# Patient Record
Sex: FEMALE | Race: BLACK OR AFRICAN AMERICAN | Employment: OTHER | URBAN - METROPOLITAN AREA
[De-identification: names, ages, dates, MRNs, and addresses within clinical notes are randomized per-mention and may not be internally consistent; named-entity substitution may affect disease eponyms.]

---

## 2017-01-16 ENCOUNTER — OFFICE VISIT (OUTPATIENT)
Dept: INTERNAL MEDICINE CLINIC | Age: 67
End: 2017-01-16

## 2017-01-16 VITALS
HEIGHT: 63 IN | RESPIRATION RATE: 18 BRPM | DIASTOLIC BLOOD PRESSURE: 85 MMHG | HEART RATE: 81 BPM | WEIGHT: 136.4 LBS | SYSTOLIC BLOOD PRESSURE: 140 MMHG | BODY MASS INDEX: 24.17 KG/M2 | TEMPERATURE: 98 F | OXYGEN SATURATION: 98 %

## 2017-01-16 DIAGNOSIS — I10 ESSENTIAL HYPERTENSION: ICD-10-CM

## 2017-01-16 DIAGNOSIS — E78.00 PURE HYPERCHOLESTEROLEMIA: ICD-10-CM

## 2017-01-16 DIAGNOSIS — E11.42 TYPE 2 DIABETES MELLITUS WITH DIABETIC POLYNEUROPATHY, WITHOUT LONG-TERM CURRENT USE OF INSULIN (HCC): Primary | ICD-10-CM

## 2017-01-16 RX ORDER — HYDROCODONE BITARTRATE AND ACETAMINOPHEN 5; 325 MG/1; MG/1
TABLET ORAL
Refills: 0 | COMMUNITY
Start: 2016-12-09 | End: 2017-07-13

## 2017-01-16 NOTE — PROGRESS NOTES
HISTORY OF PRESENT ILLNESS  Fabiola Chandler is a 78 y.o. female. HPI  Here for dm. She has been controlled on diet alone. She regained the weight she lost on metformin. Her htn is controlled. She is on a statin for hld. Past Medical History   Diagnosis Date    Allergic rhinitis     Atrophic vaginitis     DM [facility]) [facility] [address]     HLD (hyperlipidemia)     Hypertension     Neuropathy     Osteoporosis      meds started 2007     Current Outpatient Prescriptions   Medication Sig    HYDROcodone-acetaminophen (NORCO) 5-325 mg per tablet TAKE 1 TO 2 TABLET BY MOUTH EVERY 4 TO 6 HOUS AS NEEDED FOR PAIN    atorvastatin (LIPITOR) 20 mg tablet Take 1 Tab by mouth daily.  triamterene-hydrochlorothiazide (MAXZIDE) 37.5-25 mg per tablet TAKE 1 TABLET EVERY DAY    glucose blood VI test strips (BLOOD GLUCOSE TEST) strip daily     No current facility-administered medications for this visit. Review of Systems   All other systems reviewed and are negative. Visit Vitals    /85 (BP 1 Location: Right arm, BP Patient Position: Sitting)    Pulse 81    Temp 98 °F (36.7 °C) (Oral)    Resp 18    Ht 5' 3\" (1.6 m)    Wt 136 lb 6.4 oz (61.9 kg)    SpO2 98%    BMI 24.16 kg/m2       Physical Exam   Constitutional: She appears well-developed and well-nourished. Cardiovascular: Normal rate, regular rhythm and normal heart sounds. Pulmonary/Chest: Effort normal and breath sounds normal. No respiratory distress. She has no wheezes. She has no rales. Nursing note and vitals reviewed. Lab Results   Component Value Date/Time    Hemoglobin A1c 6.0 07/06/2016 10:27 AM       ASSESSMENT and PLAN  Judy Obrien was seen today for hypertension.     Diagnoses and all orders for this visit:    Type 2 diabetes mellitus with diabetic polyneuropathy, without long-term current use of insulin (HCC)  -     HEMOGLOBIN A1C WITH EAG  The current medical regimen is effective;  continue present plan and medications. Essential hypertension  -     METABOLIC PANEL, COMPREHENSIVE  The current medical regimen is effective;  continue present plan and medications. Pure hypercholesterolemia  -     LIPID PANEL  The current medical regimen is effective;  continue present plan and medications.       Reviewed plan of care with the patient who has provided input and agrees with the goals

## 2017-01-16 NOTE — MR AVS SNAPSHOT
Visit Information Date & Time Provider Department Dept. Phone Encounter #  
 1/16/2017  4:00 PM Floresita Roman MD [facility] [phone] [id number] Follow-up Instructions Return in about 6 months (around 7/16/2017). Upcoming Health Maintenance Date Due Hepatitis C Screening 1950 EYE EXAM RETINAL OR DILATED Q1 10/19/1959 ZOSTER VACCINE AGE 60> 10/19/2009 GLAUCOMA SCREENING Q2Y 10/19/2014 MEDICARE YEARLY EXAM 3/2/2016 FOOT EXAM Q1 10/8/2016 HEMOGLOBIN A1C Q6M 1/6/2017 MICROALBUMIN Q1 7/6/2017 LIPID PANEL Q1 7/6/2017 BREAST CANCER SCRN MAMMOGRAM 11/7/2018 DTaP/Tdap/Td series (2 - Td) 9/13/2019 COLONOSCOPY 12/8/2021 Allergies as of 1/16/2017  Review Complete On: 1/16/2017 By: Deangelo Lowry LPN No Known Allergies Current Immunizations  Reviewed on 12/2/2015 Name Date Pneumococcal Conjugate (PCV-13) 3/2/2015  2:34 PM  
 Pneumococcal Polysaccharide (PPSV-23) 12/2/2014 11:12 AM  
 TDAP Vaccine 9/13/2009 Not reviewed this visit You Were Diagnosed With   
  
 Codes Comments Type 2 diabetes mellitus with diabetic polyneuropathy, without long-term current use of insulin (HCC)    -  Primary ICD-10-CM: E11.42 
ICD-9-CM: 250.60, 357.2 Essential hypertension     ICD-10-CM: I10 
ICD-9-CM: 401.9 Pure hypercholesterolemia     ICD-10-CM: E78.00 ICD-9-CM: 272.0 Vitals BP Pulse Temp Resp Height(growth percentile) Weight(growth percentile) 140/85 (BP 1 Location: Right arm, BP Patient Position: Sitting) 81 98 °F (36.7 °C) (Oral) 18 5' 3\" (1.6 m) 136 lb 6.4 oz (61.9 kg) SpO2 BMI OB Status Smoking Status 98% 24.16 kg/m2 Menopause Never Smoker Vitals History BMI and BSA Data Body Mass Index Body Surface Area  
 24.16 kg/m 2 1.66 m 2 Preferred Pharmacy Pharmacy Name Phone  [phone] [phone] Your Updated Medication List  
  
   
This list is accurate as of: 1/16/17  4:05 PM.  Always use your most recent med list.  
  
  
  
  
 atorvastatin 20 mg tablet Commonly known as:  LIPITOR Take 1 Tab by mouth daily. glucose blood VI test strips strip Commonly known as:  blood glucose test  
daily HYDROcodone-acetaminophen 5-325 mg per tablet Commonly known as:  NORCO  
TAKE 1 TO 2 TABLET BY MOUTH EVERY 4 TO 6 HOUS AS NEEDED FOR PAIN  
  
 triamterene-hydroCHLOROthiazide 37.5-25 mg per tablet Commonly known as:  Patrizia Morris TAKE 1 TABLET EVERY DAY We Performed the Following HEMOGLOBIN A1C WITH EAG [11393 CPT(R)] LIPID PANEL [88869 CPT(R)] METABOLIC PANEL, COMPREHENSIVE [89665 CPT(R)] Follow-up Instructions Return in about 6 months (around 7/16/2017). Introducing [facility]! Dear Nicolas Nunez: Thank you for requesting a [employer] account. Our records indicate that you already have an active [employer] account. You can access your account anytime at [email or web address] [email or web address] Did you know that you can access your hospital and ER discharge instructions at any time in [employer]? You can also review all of your test results from your hospital stay or ER visit. Additional Information If you have questions, please visit the Frequently Asked Questions section of the [employer] website at [email or web address]/. Remember, [employer] is NOT to be used for urgent needs. For medical emergencies, dial 911. Now available from your iPhone and Android! Please provide this summary of care documentation to your next provider. Your primary care clinician is listed as [address] [address] [place] [address] [address]. If you have any questions after today's visit, please call [phone].

## 2017-01-17 LAB
ALBUMIN SERPL-MCNC: 4.5 G/DL (ref 3.6–4.8)
ALBUMIN/GLOB SERPL: 1.7 {RATIO} (ref 1.1–2.5)
ALP SERPL-CCNC: 42 IU/L (ref 39–117)
ALT SERPL-CCNC: 11 IU/L (ref 0–32)
AST SERPL-CCNC: 15 IU/L (ref 0–40)
BILIRUB SERPL-MCNC: 0.3 MG/DL (ref 0–1.2)
BUN SERPL-MCNC: 15 MG/DL (ref 8–27)
BUN/CREAT SERPL: 20 (ref 11–26)
CALCIUM SERPL-MCNC: 10 MG/DL (ref 8.7–10.3)
CHLORIDE SERPL-SCNC: 100 MMOL/L (ref 96–106)
CHOLEST SERPL-MCNC: 142 MG/DL (ref 100–199)
CO2 SERPL-SCNC: 24 MMOL/L (ref 18–29)
CREAT SERPL-MCNC: 0.74 MG/DL (ref 0.57–1)
EST. AVERAGE GLUCOSE BLD GHB EST-MCNC: 128 MG/DL
GLOBULIN SER CALC-MCNC: 2.7 G/DL (ref 1.5–4.5)
GLUCOSE SERPL-MCNC: 83 MG/DL (ref 65–99)
HBA1C MFR BLD: 6.1 % (ref 4.8–5.6)
HDLC SERPL-MCNC: 55 MG/DL
INTERPRETATION, 910389: NORMAL
LDLC SERPL CALC-MCNC: 71 MG/DL (ref 0–99)
POTASSIUM SERPL-SCNC: 4.3 MMOL/L (ref 3.5–5.2)
PROT SERPL-MCNC: 7.2 G/DL (ref 6–8.5)
SODIUM SERPL-SCNC: 140 MMOL/L (ref 134–144)
TRIGL SERPL-MCNC: 80 MG/DL (ref 0–149)
VLDLC SERPL CALC-MCNC: 16 MG/DL (ref 5–40)

## 2017-04-24 RX ORDER — ATORVASTATIN CALCIUM 20 MG/1
20 TABLET, FILM COATED ORAL DAILY
Qty: 90 TAB | Refills: 1 | Status: SHIPPED | OUTPATIENT
Start: 2017-04-24 | End: 2017-09-09 | Stop reason: SDUPTHER

## 2017-04-24 NOTE — TELEPHONE ENCOUNTER
Pt needs a refill on \"Atorvastatin\" 20  Mg (Rx # [id number]) called into [facility] [phone]. Best contact number is [phone].

## 2017-06-12 ENCOUNTER — TELEPHONE (OUTPATIENT)
Dept: INTERNAL MEDICINE CLINIC | Age: 67
End: 2017-06-12

## 2017-06-12 DIAGNOSIS — M25.551 RIGHT HIP PAIN: Primary | ICD-10-CM

## 2017-06-14 ENCOUNTER — TELEPHONE (OUTPATIENT)
Dept: INTERNAL MEDICINE CLINIC | Age: 67
End: 2017-06-14

## 2017-06-14 NOTE — TELEPHONE ENCOUNTER
Spoke with patient. Patient states that the doctor  On the referral do not take her insurance.  Patient has already contacted her insurance company to find another doctor

## 2017-06-14 NOTE — TELEPHONE ENCOUNTER
Pt is requesting a call back about a orthopedic doctor.  Pt contact number (C) [phone] or [phone]      From answering service

## 2017-06-28 ENCOUNTER — TELEPHONE (OUTPATIENT)
Dept: INTERNAL MEDICINE CLINIC | Age: 67
End: 2017-06-28

## 2017-06-28 NOTE — TELEPHONE ENCOUNTER
Patient states that her orthopedic doctor Dr. Anum Bonds gave her the order for the MRI.  Patient states that she will contact that office

## 2017-06-28 NOTE — TELEPHONE ENCOUNTER
Pt is requesting a referral be faxed to [facility] MRI department at [phone]. Pt has an appt scheduled on 06/30/2017 at 9:30am. Office number [phone]. Best contact number [phone].       From answering service

## 2017-06-28 NOTE — TELEPHONE ENCOUNTER
Pt is requesting an authorization sent to [facility] for an MRI.  Please give pt a call back

## 2017-07-13 ENCOUNTER — OFFICE VISIT (OUTPATIENT)
Dept: INTERNAL MEDICINE CLINIC | Age: 67
End: 2017-07-13

## 2017-07-13 VITALS
HEART RATE: 81 BPM | WEIGHT: 135.8 LBS | HEIGHT: 63 IN | DIASTOLIC BLOOD PRESSURE: 82 MMHG | OXYGEN SATURATION: 99 % | BODY MASS INDEX: 24.06 KG/M2 | RESPIRATION RATE: 17 BRPM | TEMPERATURE: 97.5 F | SYSTOLIC BLOOD PRESSURE: 124 MMHG

## 2017-07-13 DIAGNOSIS — Z00.00 MEDICARE ANNUAL WELLNESS VISIT, SUBSEQUENT: ICD-10-CM

## 2017-07-13 DIAGNOSIS — Z23 NEED FOR ZOSTAVAX ADMINISTRATION: ICD-10-CM

## 2017-07-13 DIAGNOSIS — E11.42 TYPE 2 DIABETES MELLITUS WITH DIABETIC POLYNEUROPATHY, WITHOUT LONG-TERM CURRENT USE OF INSULIN (HCC): Primary | ICD-10-CM

## 2017-07-13 DIAGNOSIS — Z71.89 LIVING WILL, COUNSELING/DISCUSSION: ICD-10-CM

## 2017-07-13 DIAGNOSIS — Z13.31 SCREENING FOR DEPRESSION: ICD-10-CM

## 2017-07-13 DIAGNOSIS — E78.00 PURE HYPERCHOLESTEROLEMIA: ICD-10-CM

## 2017-07-13 DIAGNOSIS — I10 ESSENTIAL HYPERTENSION: ICD-10-CM

## 2017-07-13 NOTE — PROGRESS NOTES
HISTORY OF PRESENT ILLNESS  Abhishek Marshall is a 78 y.o. female. HPI  Here for dm. She is controlled on diet alone. She has gained weight off metformin and is pleased. Her htn is controlled. She is on a statin. Her hcm is current. Past Medical History:   Diagnosis Date    Allergic rhinitis     Atrophic vaginitis     DM (diabetes mellitus) ([address]     HLD (hyperlipidemia)     Hypertension     [facility]urop[facility] ([facility] [address]     Osteoporosis     meds started 2007     Current Outpatient Prescriptions   Medication Sig    atorvastatin (LIPITOR) 20 mg tablet Take 1 Tab by mouth daily.  triamterene-hydrochlorothiazide (MAXZIDE) 37.5-25 mg per tablet TAKE 1 TABLET EVERY DAY    glucose blood VI test strips (BLOOD GLUCOSE TEST) strip daily     No current facility-administered medications for this visit. Review of Systems   All other systems reviewed and are negative. Visit Vitals    /82 (BP 1 Location: Left arm, BP Patient Position: At rest)    Pulse 81    Temp 97.5 °F (36.4 °C) (Oral)    Resp 17    Ht 5' 3\" (1.6 m)    Wt 135 lb 12.8 oz (61.6 kg)    SpO2 99%    BMI 24.06 kg/m2       Physical Exam   Constitutional: She appears well-developed and well-nourished. Cardiovascular: Normal rate, regular rhythm, normal heart sounds and intact distal pulses. Pulmonary/Chest: Effort normal and breath sounds normal. No respiratory distress. She has no wheezes. She has no rales. Musculoskeletal: She exhibits no edema. Foot exam - bilateral normal; no swelling, tenderness or skin or vascular lesions. Color and temperature is normal. Sensation is intact. Peripheral pulses are palpable. Toenails are normal.     Nursing note and vitals reviewed. Lab Results   Component Value Date/Time    Hemoglobin A1c 6.1 01/16/2017 04:12 PM       ASSESSMENT and PLAN  Chance Clifton was seen today for hypertension, diabetes and annual wellness visit.     Diagnoses and all orders for this visit:    Type 2 diabetes mellitus with diabetic polyneuropathy, without long-term current use of insulin (HCC)  -     HEMOGLOBIN A1C WITH EAG  -     MICROALBUMIN, UR, RAND W/ MICROALBUMIN/CREA RATIO  -      DIABETES FOOT EXAM  The current medical regimen is effective;  continue present plan and medications. Essential hypertension  -     METABOLIC PANEL, COMPREHENSIVE  The current medical regimen is effective;  continue present plan and medications. Pure hypercholesterolemia  -     LIPID PANEL  The current medical regimen is effective;  continue present plan and medications.         Reviewed plan of care with the patient who has provided input and agrees with the goals

## 2017-07-13 NOTE — PATIENT INSTRUCTIONS
Medicare Part B Preventive Services Limitations Recommendation Scheduled   Glaucoma Screening  (Eye Exam)  Covered for patients with diagnosis of diabetes or family history of glaucoma; -Americans age 48 and older; -Americans age 72 and older Completed around 8/2016    Recommended every 1-2 years Due 8/2017 or 9/2017   Colorectal Cancer Screening    -Fecal occult blood test every year OR  -Flexible sigmoidoscopy every 5 yrs OR  -Colonoscopy every 10 years OR  -Barium Enema Age 54-65; After age 76 if history of abnormal results Completed 12/8/2011      Recommended every 10 years  Due 12/2021   Bone Mass Measurement (Chris Lords)     Age 61 and older    Requires diagnosis related to osteoporosis or estrogen deficiency OR patient has history of long-term glucocorticoid treatment     Every 5 years for normal scan, every 2 years for abnormal scan Completed 1/16/2015      Recommended every 5 years Due 1/2020    Take Vitamin D 1,000 units daily. Do not take Calcium   Screening Mammography  Age 54-69; Every 2 years  Completed 11/7/2016      Recommended every 2 years  Due 11/2017    Please call   [phone] to schedule   Screening Pap Tests and Pelvic Examination                  Age 18-67; Every 3 years     Completed around 6/2016    Recommended every 3 years Due 6/2019   Cardiovascular Screening Blood Tests   (Cholesterol panel) Lipid panel every 5 years; Annually if diagnosed with high cholesterol and/or diabetes  Completed 1/16/2017      Recommended annually Due NOW   Diabetes Screening Tests    -Basic Metabolic Panel (BMP) or [id number] (HgbA1C)   BMP every 3 years for non-diabetic patients    Hgb A1C every 6 months for   pre-diabetic patients or HgbA1C <7    HgbA1C every 3 months if 7 or greater Completed 1/16/2017    HgbA1C  Recommended every 6 months Due NOW   Resources for Smoking Cessation    American Lung Association  www.lung. org  8-248-GJIRUNK    www.smokefree. gov    1-800-QUIT NOW   Seasonal Influenza Vaccination             Age 6 months and older Completed 11/2016    Recommended Annually Due Fall 2017   Pneumococcal Vaccination  (PPSV 21) Age 72 and older;  <65 if at high risk for getting Pneumonia Completed 12/2/2014    Recommended twice after age 72, space vaccines 5 years apart Due 12/2019       Prevnar Vaccination  (PCV 13) Age 72 and older Completed 3/2/2015    Recommended once Complete   Tetanus Vaccine -Only covered by Medicare Part D through the [address] a prescription from your primary care provider   Completed 9/13/2009    Recommended every 10 years  Due 9/2019    Take prescription to pharmacy for administration   Zoster Vaccine (Shingles) Age 61 and older, one time dose    -Only covered by Medicare Part D through the pharmacy       Completed never        Recommended once  Due NOW    Take prescription to pharmacy for administration     Advanced Medical Directive/Living Will  If you have completed an Advanced Medical Directive or a Living Will please bring a copy to the office at your convenience to be scanned into your record.

## 2017-07-13 NOTE — PROGRESS NOTES
This is a Subsequent Medicare Annual Wellness Visit providing Personalized Prevention Plan Services (PPPS) (Performed 12 months after initial AWV and PPPS )    I have reviewed the patient's medical history in detail and updated the computerized patient record. History     Past Medical History:   Diagnosis Date    Allergic rhinitis     Atrophic vaginitis     [facility] [address]     HLD (hyperlipidemia)     Hypertension     Neuropathy ([facility] [address]     Osteoporosis     meds started 2007      Past Surgical History:   Procedure Laterality Date    HX COLONOSCOPY  2011     Current Outpatient Prescriptions   Medication Sig Dispense Refill    atorvastatin (LIPITOR) 20 mg tablet Take 1 Tab by mouth daily.  90 Tab 1    triamterene-hydrochlorothiazide (MAXZIDE) 37.5-25 mg per tablet TAKE 1 TABLET EVERY DAY 90 Tab 3    glucose blood VI test strips (BLOOD GLUCOSE TEST) strip daily 1 Each 11     No Known Allergies  Family History   Problem Relation Age of Onset    Breast Cancer Mother 48     breast    Diabetes Brother     Diabetes Brother     Hypertension Brother      Social History   Substance Use Topics    Smoking status: Never Smoker    Smokeless tobacco: Never Used    Alcohol use Yes     Patient Active Problem List   Diagnosis Code    Common migraine G43.009    GERD (gastroesophageal reflux disease) K21.9    Osteopenia M85.80    Atrophic vaginitis N95.2    Allergic rhinitis J30.9    Type 2 diabetes mellitus with diabetic polyneuropathy (HCC) E11.42    Essential hypertension I10    Pure hypercholesterolemia E78.00    Living will, counseling/discussion Z71.89       Depression Risk Factor Screening:     PHQ over the last two weeks 7/13/2017   Little interest or pleasure in doing things Not at all   Feeling down, depressed or hopeless Not at all   Total Score PHQ 2 0     Alcohol Risk Factor Screening:   Deferred      Functional Ability and Level of Safety:     Hearing Loss   none    Activities of Daily Living   Self-care. Requires assistance with: no ADLs    Fall Risk     Fall Risk Assessment, last 12 mths 7/13/2017   Able to walk? Yes   Fall in past 12 months? No   Fall with injury? -   Number of falls in past 12 months -   Fall Risk Score -     Abuse Screen   Patient is not abused    Review of Systems   Not required  Annual Medicare Wellness Visit    Physical Examination     Evaluation of Cognitive Function:  Mood/affect:  Happy, pleasant  Appearance: age appropriate, well-dressed  Family member/caregiver input: none present    No exam performed today, Annual Medicare Wellness Visit. Patient Care Team:  Carie Skinner MD as PCP - Sudheer Dubon MD (Orthopedic Surgery)  Amparo Hudson MD (Orthopedic Surgery)  Dr. Davey Gifford (Ophthalmology)  Tricia Clements MD (Gynecology)    Advice/Referrals/Counseling   Education and counseling provided:  Are appropriate based on today's review and evaluation  End-of-Life planning (with patient's consent)  Bone mass measurement (DEXA)      Assessment/Plan       ICD-10-CM ICD-9-CM    1. Type 2 diabetes mellitus with diabetic polyneuropathy, without long-term current use of insulin (HCC) E11.42 250.60 HEMOGLOBIN A1C WITH EAG     357.2 MICROALBUMIN, UR, RAND W/ MICROALBUMIN/CREA RATIO      HM DIABETES FOOT EXAM   2. Essential hypertension W62 707.9 METABOLIC PANEL, COMPREHENSIVE   3. Pure hypercholesterolemia E78.00 272.0 LIPID PANEL   4. Medicare annual wellness visit, subsequent Z00.00 V70.0 CBC WITH AUTOMATED DIFF   5. Need for Zostavax administration Z23 V04.89    6. Living will, counseling/discussion Z71.89 V65.49    7. Screening for depression Z13.89 V79.0 DEPRESSION SCREEN ANNUAL       1. Patient reports she has a Living Will. Requesting a copy be brought to the office for scanning into her chart. 2. Discussed preventative screenings and vaccines.  Per Dr. Bony Larsen, she will not be due for a dexascan for a few years, but he would like her to start taking Vitamin D 1,000 units daily and do not take calcium. Dr. Rajani Harrison ordered labs and lab slip was given to patient. She is due for Zostavax and Rx was given to patient and she was instructed to take to the pharmacy for administration. She is up to date on all other screenings and vaccines. 3. Discussed HgbA1C and current status of pre-diabetes. She has been working on diet and exercise, but feels she could cut out more carbs than she is currently cutting out. Provided patient with this CDE's contact information if she would like more education. AVS and preventative screenings table printed, reviewed, and handed to patient. Patient verbalized understanding to all information.

## 2017-07-13 NOTE — MR AVS SNAPSHOT
Visit Information Date & Time Provider Department Dept. Phone Encounter #  
 7/13/2017 10:30 AM Sergey Quiroz MD [facility] [phone] [id number] Upcoming Health Maintenance Date Due Hepatitis C Screening 1950 EYE EXAM RETINAL OR DILATED Q1 10/19/1959 ZOSTER VACCINE AGE 60> 10/19/2009 FOOT EXAM Q1 10/8/2016 MICROALBUMIN Q1 7/6/2017 HEMOGLOBIN A1C Q6M 7/16/2017 LIPID PANEL Q1 1/16/2018 MEDICARE YEARLY EXAM 7/14/2018 GLAUCOMA SCREENING Q2Y 8/1/2018 BREAST CANCER SCRN MAMMOGRAM 11/7/2018 DTaP/Tdap/Td series (2 - Td) 9/13/2019 COLONOSCOPY 12/8/2021 Allergies as of 7/13/2017  Review Complete On: 7/13/2017 By: Maegan Mata No Known Allergies Current Immunizations  Reviewed on 12/2/2015 Name Date Pneumococcal Conjugate (PCV-13) 3/2/2015  2:34 PM  
 Pneumococcal Polysaccharide (PPSV-23) 12/2/2014 11:12 AM  
 TDAP Vaccine 9/13/2009 Not reviewed this visit You Were Diagnosed With   
  
 Codes Comments Type 2 diabetes mellitus with diabetic polyneuropathy, without long-term current use of insulin (HCC)    -  Primary ICD-10-CM: E11.42 
ICD-9-CM: 250.60, 357.2 Essential hypertension     ICD-10-CM: I10 
ICD-9-CM: 401.9 Pure hypercholesterolemia     ICD-10-CM: E78.00 ICD-9-CM: 272.0 Medicare annual wellness visit, subsequent     ICD-10-CM: Z00.00 ICD-9-CM: V70.0 Need for Zostavax administration     ICD-10-CM: I06 ICD-9-CM: V04.89 Living will, counseling/discussion     ICD-10-CM: Z71.89 ICD-9-CM: V65.49 Screening for depression     ICD-10-CM: Z13.89 ICD-9-CM: V79.0 Vitals BP Pulse Temp Resp Height(growth percentile) Weight(growth percentile) 124/82 (BP 1 Location: Left arm, BP Patient Position: At rest) 81 97.5 °F (36.4 °C) (Oral) 17 5' 3\" (1.6 m) 135 lb 12.8 oz (61.6 kg) SpO2 BMI OB Status Smoking Status 99% 24.06 kg/m2 Menopause Never Smoker BMI and BSA Data Body Mass Index Body Surface Area 24.06 kg/m 2 1.65 m 2 Preferred Pharmacy Pharmacy Name Phone Nilad Alejandra [address] [place] [address]  [facility] [address] [phone] Your Updated Medication List  
  
   
This list is accurate as of: 7/13/17 11:07 AM.  Always use your most recent med list.  
  
  
  
  
 atorvastatin 20 mg tablet Commonly known as:  LIPITOR Take 1 Tab by mouth daily. glucose blood VI test strips strip Commonly known as:  blood glucose test  
daily  
  
 triamterene-hydroCHLOROthiazide 37.5-25 mg per tablet Commonly known as:  Albesa Flatten TAKE 1 TABLET EVERY DAY We Performed the Following CBC WITH AUTOMATED DIFF [11718 CPT(R)] [address] [[id number] [facility]] HEMOGLOBIN A1C WITH EAG [82951 CPT(R)]  DIABETES FOOT EXAM [7 Custom] LIPID PANEL [71821 CPT(R)] METABOLIC PANEL, COMPREHENSIVE [69822 CPT(R)] MICROALBUMIN, UR, RAND W/ MICROALBUMIN/CREA RATIO [id number] CPT(R)] Patient Instructions Medicare Part B Preventive Services Limitations Recommendation Scheduled Glaucoma Screening 
(Eye Exam)  Covered for patients with diagnosis of diabetes or family history of glaucoma; -Americans age 48 and older; -Americans age 72 and older Completed around 8/2016 Recommended every 1-2 years Due 8/2017 or 9/2017 Colorectal Cancer Screening 
 
-Fecal occult blood test every year OR 
-Flexible sigmoidoscopy every 5 yrs OR 
-Colonoscopy every 10 years OR 
-Barium Enema Age 54-65; After age 76 if history of abnormal results Completed 12/8/2011 Recommended every 10 years  Due 12/2021 Bone Mass Measurement (Dexascan) Age 61 and older Requires diagnosis related to osteoporosis or estrogen deficiency OR patient has history of long-term glucocorticoid treatment Every 5 years for normal scan, every 2 years for abnormal scan Completed 1/16/2015 Recommended every 5 years Due 1/2020 Take Vitamin D 1,000 units daily. Do not take Calcium Screening Mammography  Age 54-69; Every 2 years  Completed 11/7/2016 Recommended every 2 years  Due 11/2017 Please call  
[phone] to schedule Screening Pap Tests and Pelvic Examination Age 18-67; Every 3 years Completed around 6/2016 Recommended every 3 years Due 6/2019 Cardiovascular Screening Blood Tests  
(Cholesterol panel) Lipid panel every 5 years; Annually if diagnosed with high cholesterol and/or diabetes  Completed 1/16/2017 Recommended annually Due NOW Diabetes Screening Tests -Basic Metabolic Panel (BMP) or [id number] (HgbA1C) BMP every 3 years for non-diabetic patients Hgb A1C every 6 months for  
pre-diabetic patients or HgbA1C <7 HgbA1C every 3 months if 7 or greater Completed 1/16/2017 HgbA1C Recommended every 6 months Due NOW Resources for Smoking Cessation    American Lung Association 
www.lung. org 
3-000-WEYYGJV 
 
www.smokefree. gov 
 
1-800-QUIT NOW Seasonal Influenza Vaccination Age 6 months and older Completed 11/2016 Recommended Annually Due Fall 2017 Pneumococcal Vaccination 
(PPSV 23) Age 72 and older; 
<65 if at high risk for getting Pneumonia Completed 12/2/2014 Recommended twice after age 72, space vaccines 5 years apart Due 12/2019 Prevnar Vaccination (PCV 13) Age 72 and older Completed 3/2/2015 Recommended once Complete Tetanus Vaccine -Only covered by Medicare Part D through the pharmacy -Requires a prescription from your primary care provider Completed 9/13/2009 Recommended every 10 years  Due 9/2019 Take prescription to pharmacy for administration Zoster Vaccine (Shingles) Age 61 and older, one time dose 
 
-Only covered by Medicare Part D through the pharmacy Completed never Recommended once  Due NOW Take prescription to pharmacy for administration Advanced Medical Directive/Living Will If you have completed an Advanced Medical Directive or a Living Will please bring a copy to the office at your convenience to be scanned into your record. Introducing [facility] SERVICES! Dear Nasreen Rivera: Thank you for requesting a Shidonni account. Our records indicate that you already have an active Shidonni account. You can access your account anytime at [email or web address] Did you know that you can access your hospital and ER discharge instructions at any time in Shidonni? You can also review all of your test results from your hospital stay or ER visit. Additional Information If you have questions, please visit the Frequently Asked Questions section of the Shidonni website at [email or web address] Remember, Shidonni is NOT to be used for urgent needs. For medical emergencies, dial 911. Now available from your iPhone and Android! Please provide this summary of care documentation to your next provider. Your primary care clinician is listed as [id number] [address] [place] [address] [address]. If you have any questions after today's visit, please call [phone].

## 2017-07-13 NOTE — PROGRESS NOTES
Reviewed record in preparation for visit and have obtained necessary documentation. Identified pt with two pt identifiers(name and ). Health Maintenance Due   Topic    Hepatitis C Screening     EYE EXAM RETINAL OR DILATED Q1     ZOSTER VACCINE AGE 60>     GLAUCOMA SCREENING Q2Y     MEDICARE YEARLY EXAM     FOOT EXAM Q1     MICROALBUMIN Q1     HEMOGLOBIN A1C Q6M          Chief Complaint   Patient presents with    Hypertension     6 months follow up    Diabetes     6 months follow up    Annual Wellness Visit        Wt Readings from Last 3 Encounters:   17 136 lb 6.4 oz (61.9 kg)   16 132 lb (59.9 kg)   12/02/15 135 lb (61.2 kg)     Temp Readings from Last 3 Encounters:   17 98 °F (36.7 °C) (Oral)   16 98.1 °F (36.7 °C) (Oral)   12/02/15 98.4 °F (36.9 °C) (Oral)     BP Readings from Last 3 Encounters:   17 140/85   16 135/82   12/02/15 126/77     Pulse Readings from Last 3 Encounters:   17 81   16 74   12/02/15 74           Learning Assessment:  :     Learning Assessment 2017   PRIMARY LEARNER Patient   PRIMARY LANGUAGE ENGLISH   LEARNER PREFERENCE PRIMARY OTHER (COMMENT)   ANSWERED BY patient   RELATIONSHIP SELF       Depression Screening:  :     PHQ over the last two weeks 2017   Little interest or pleasure in doing things Not at all   Feeling down, depressed or hopeless Not at all   Total Score PHQ 2 0       Fall Risk Assessment:  :     Fall Risk Assessment, last 12 mths 2017   Able to walk? Yes   Fall in past 12 months? No   Fall with injury? -   Number of falls in past 12 months -   Fall Risk Score -       Abuse Screening:  :     Abuse Screening Questionnaire 2017   Do you ever feel afraid of your partner? N   Are you in a relationship with someone who physically or mentally threatens you? N   Is it safe for you to go home?  Y       Coordination of Care Questionnaire:  :     1) Have you been to an emergency room, urgent care clinic since your last visit? No    Hospitalized since your last visit? No      2) Have you seen or consulted any other health care providers outside of [address] [place] [address] since your last visit? No    (Include any pap smears or colon screenings in this section.)    3) Do you have an Advance Directive on file? no    4) Are you interested in receiving information on Advance Directives? no      Patient is accompanied by self  I have received verbal consent from Lynita Schilder to discuss any/all medical information while they are present in the room.

## 2017-07-14 LAB
ALBUMIN SERPL-MCNC: 4.5 G/DL (ref 3.6–4.8)
ALBUMIN/CREAT UR: 7.5 MG/G CREAT (ref 0–30)
ALBUMIN/GLOB SERPL: 1.7 {RATIO} (ref 1.2–2.2)
ALP SERPL-CCNC: 41 IU/L (ref 39–117)
ALT SERPL-CCNC: 9 IU/L (ref 0–32)
AST SERPL-CCNC: 17 IU/L (ref 0–40)
BASOPHILS # BLD AUTO: 0 X10E3/UL (ref 0–0.2)
BASOPHILS NFR BLD AUTO: 1 %
BILIRUB SERPL-MCNC: 0.4 MG/DL (ref 0–1.2)
BUN SERPL-MCNC: 19 MG/DL (ref 8–27)
BUN/CREAT SERPL: 21 (ref 12–28)
CALCIUM SERPL-MCNC: 9.8 MG/DL (ref 8.7–10.3)
CHLORIDE SERPL-SCNC: 99 MMOL/L (ref 96–106)
CHOLEST SERPL-MCNC: 142 MG/DL (ref 100–199)
CO2 SERPL-SCNC: 24 MMOL/L (ref 18–29)
CREAT SERPL-MCNC: 0.92 MG/DL (ref 0.57–1)
CREAT UR-MCNC: 164.1 MG/DL
EOSINOPHIL # BLD AUTO: 0.1 X10E3/UL (ref 0–0.4)
EOSINOPHIL NFR BLD AUTO: 3 %
ERYTHROCYTE [DISTWIDTH] IN BLOOD BY AUTOMATED COUNT: 15.5 % (ref 12.3–15.4)
EST. AVERAGE GLUCOSE BLD GHB EST-MCNC: 123 MG/DL
GLOBULIN SER CALC-MCNC: 2.7 G/DL (ref 1.5–4.5)
GLUCOSE SERPL-MCNC: 89 MG/DL (ref 65–99)
HBA1C MFR BLD: 5.9 % (ref 4.8–5.6)
HCT VFR BLD AUTO: 40.3 % (ref 34–46.6)
HDLC SERPL-MCNC: 57 MG/DL
HGB BLD-MCNC: 12.6 G/DL (ref 11.1–15.9)
IMM GRANULOCYTES # BLD: 0 X10E3/UL (ref 0–0.1)
IMM GRANULOCYTES NFR BLD: 0 %
INTERPRETATION, 910389: NORMAL
LDLC SERPL CALC-MCNC: 72 MG/DL (ref 0–99)
LYMPHOCYTES # BLD AUTO: 1.8 X10E3/UL (ref 0.7–3.1)
LYMPHOCYTES NFR BLD AUTO: 48 %
Lab: NORMAL
MCH RBC QN AUTO: 25.1 PG (ref 26.6–33)
MCHC RBC AUTO-ENTMCNC: 31.3 G/DL (ref 31.5–35.7)
MCV RBC AUTO: 80 FL (ref 79–97)
MICROALBUMIN UR-MCNC: 12.3 UG/ML
MONOCYTES # BLD AUTO: 0.2 X10E3/UL (ref 0.1–0.9)
MONOCYTES NFR BLD AUTO: 6 %
NEUTROPHILS # BLD AUTO: 1.6 X10E3/UL (ref 1.4–7)
NEUTROPHILS NFR BLD AUTO: 42 %
PLATELET # BLD AUTO: 280 X10E3/UL (ref 150–379)
POTASSIUM SERPL-SCNC: 4.1 MMOL/L (ref 3.5–5.2)
PROT SERPL-MCNC: 7.2 G/DL (ref 6–8.5)
RBC # BLD AUTO: 5.02 X10E6/UL (ref 3.77–5.28)
SODIUM SERPL-SCNC: 141 MMOL/L (ref 134–144)
TRIGL SERPL-MCNC: 65 MG/DL (ref 0–149)
VLDLC SERPL CALC-MCNC: 13 MG/DL (ref 5–40)
WBC # BLD AUTO: 3.8 X10E3/UL (ref 3.4–10.8)

## 2017-07-25 ENCOUNTER — TELEPHONE (OUTPATIENT)
Dept: INTERNAL MEDICINE CLINIC | Age: 67
End: 2017-07-25

## 2017-07-26 NOTE — TELEPHONE ENCOUNTER
Writer contacted patient to inquire question regarding V-D, questions answered, patient verbalized understanding.

## 2017-07-27 ENCOUNTER — OFFICE VISIT (OUTPATIENT)
Dept: INTERNAL MEDICINE CLINIC | Age: 67
End: 2017-07-27

## 2017-07-27 VITALS
RESPIRATION RATE: 18 BRPM | DIASTOLIC BLOOD PRESSURE: 77 MMHG | SYSTOLIC BLOOD PRESSURE: 126 MMHG | BODY MASS INDEX: 24.17 KG/M2 | WEIGHT: 136.4 LBS | OXYGEN SATURATION: 98 % | HEIGHT: 63 IN | HEART RATE: 74 BPM | TEMPERATURE: 98.6 F

## 2017-07-27 DIAGNOSIS — R10.13 EPIGASTRIC PAIN: Primary | ICD-10-CM

## 2017-07-27 RX ORDER — DICYCLOMINE HYDROCHLORIDE 10 MG/1
CAPSULE ORAL
COMMUNITY
Start: 2017-07-19 | End: 2020-06-30

## 2017-07-27 RX ORDER — OMEPRAZOLE 20 MG/1
20 CAPSULE, DELAYED RELEASE ORAL DAILY
Qty: 90 CAP | Refills: 1 | Status: SHIPPED | OUTPATIENT
Start: 2017-07-27 | End: 2020-06-30

## 2017-07-27 NOTE — PROGRESS NOTES
1. Have you been to the ER, urgent care clinic since your last visit? Hospitalized since your last visit?no    2. Have you seen or consulted any other health care providers outside of the [address] [place] [address] since your last visit? Include any pap smears or colon screening.  No    Chief Complaint   Patient presents with    Abdominal Pain     cramps for 3 weeks     Not fasting

## 2017-07-27 NOTE — PROGRESS NOTES
HISTORY OF PRESENT ILLNESS  Hoa Mg is a 78 y.o. female. HPI  Here for abdominal pain for three weeks. She has this after eating with some bloating. No nausea or change in bm . Past Medical History:   Diagnosis Date    Allergic rhinitis     Atrophic vaginitis     [facility]     HLD (hyperlipidemia)     Hypertension     Neuropathy ([facility]     Osteoporosis     meds started 2007     Current Outpatient Prescriptions   Medication Sig    dicyclomine (BENTYL) 10 mg capsule     omeprazole (PRILOSEC) 20 mg capsule Take 1 Cap by mouth daily.  atorvastatin (LIPITOR) 20 mg tablet Take 1 Tab by mouth daily.  triamterene-hydrochlorothiazide (MAXZIDE) 37.5-25 mg per tablet TAKE 1 TABLET EVERY DAY    glucose blood VI test strips (BLOOD GLUCOSE TEST) strip daily     No current facility-administered medications for this visit. Review of Systems   All other systems reviewed and are negative. Visit Vitals    /77 (BP 1 Location: Left arm, BP Patient Position: At rest)    Pulse 74    Temp 98.6 °F (37 °C) (Oral)    Resp 18    Ht 5' 3\" (1.6 m)    Wt 136 lb 6.4 oz (61.9 kg)    SpO2 98%    BMI 24.16 kg/m2         Physical Exam   Constitutional: She appears well-developed and well-nourished. Abdominal: Soft. Bowel sounds are normal. She exhibits no distension and no mass. There is tenderness. There is no rebound and no guarding. Epigastric. Nursing note and vitals reviewed. ASSESSMENT and PLAN  Diagnoses and all orders for this visit:    1. Epigastric pain  -     omeprazole (PRILOSEC) 20 mg capsule; Take 1 Cap by mouth daily.  -     REFERRAL TO GASTROENTEROLOGY  May be gastroparesis with DM/neuropathy.       Reviewed plan of care with the patient who has provided input and agrees with the goals

## 2017-09-11 RX ORDER — ATORVASTATIN CALCIUM 20 MG/1
TABLET, FILM COATED ORAL
Qty: 90 TAB | Refills: 1 | Status: SHIPPED | OUTPATIENT
Start: 2017-09-11 | End: 2018-01-23 | Stop reason: SDUPTHER

## 2017-12-18 ENCOUNTER — HOSPITAL ENCOUNTER (OUTPATIENT)
Dept: MAMMOGRAPHY | Age: 67
Discharge: HOME | End: 2017-12-18
Payer: MEDICARE

## 2017-12-18 DIAGNOSIS — Z12.31 VISIT FOR SCREENING MAMMOGRAM: ICD-10-CM

## 2017-12-18 PROCEDURE — 77067 SCR MAMMO BI INCL CAD: CPT

## 2018-01-15 ENCOUNTER — OFFICE VISIT (OUTPATIENT)
Dept: INTERNAL MEDICINE CLINIC | Age: 68
End: 2018-01-15

## 2018-01-15 VITALS
HEART RATE: 75 BPM | OXYGEN SATURATION: 99 % | DIASTOLIC BLOOD PRESSURE: 78 MMHG | BODY MASS INDEX: 24.1 KG/M2 | WEIGHT: 136 LBS | HEIGHT: 63 IN | RESPIRATION RATE: 18 BRPM | TEMPERATURE: 98 F | SYSTOLIC BLOOD PRESSURE: 118 MMHG

## 2018-01-15 DIAGNOSIS — M85.80 OSTEOPENIA, UNSPECIFIED LOCATION: ICD-10-CM

## 2018-01-15 DIAGNOSIS — E78.00 PURE HYPERCHOLESTEROLEMIA: ICD-10-CM

## 2018-01-15 DIAGNOSIS — Z78.0 MENOPAUSE: ICD-10-CM

## 2018-01-15 DIAGNOSIS — I10 ESSENTIAL HYPERTENSION: ICD-10-CM

## 2018-01-15 DIAGNOSIS — E11.42 TYPE 2 DIABETES MELLITUS WITH DIABETIC POLYNEUROPATHY, WITHOUT LONG-TERM CURRENT USE OF INSULIN (HCC): Primary | ICD-10-CM

## 2018-01-15 NOTE — MR AVS SNAPSHOT
Visit Information Date & Time Provider Department Dept. Phone Encounter #  
 1/15/2018 10:30 AM La Zamarripa MD [facility] [phone] [id number] Your Appointments 1/15/2018 10:30 AM  
ROUTINE CARE with La Zamarripa MD  
[facility]) Appt Note: a1c check [place] [address] [facility] [address]  
[facility] [address] [address] [facility] [address] Upcoming Health Maintenance Date Due Hepatitis C Screening 1950 EYE EXAM RETINAL OR DILATED Q1 10/19/1959 ZOSTER VACCINE AGE 60> 8/19/2009 HEMOGLOBIN A1C Q6M 1/13/2018 FOOT EXAM Q1 7/13/2018 MICROALBUMIN Q1 7/13/2018 LIPID PANEL Q1 7/13/2018 MEDICARE YEARLY EXAM 7/14/2018 GLAUCOMA SCREENING Q2Y 8/1/2018 DTaP/Tdap/Td series (2 - Td) 9/13/2019 BREAST CANCER SCRN MAMMOGRAM 12/18/2019 COLONOSCOPY 12/8/2021 Allergies as of 1/15/2018  Review Complete On: 1/15/2018 By: Lilly Begum No Known Allergies Current Immunizations  Reviewed on 12/2/2015 Name Date Influenza High Dose Vaccine PF 10/15/2017 Pneumococcal Conjugate (PCV-13) 3/2/2015  2:34 PM  
 Pneumococcal Polysaccharide (PPSV-23) 12/2/2014 11:12 AM  
 TDAP Vaccine 9/13/2009 Not reviewed this visit You Were Diagnosed With   
  
 Codes Comments Type 2 diabetes mellitus with diabetic polyneuropathy, without long-term current use of insulin (HCC)    -  Primary ICD-10-CM: E11.42 
ICD-9-CM: 250.60, 357.2 Essential hypertension     ICD-10-CM: I10 
ICD-9-CM: 401.9 Pure hypercholesterolemia     ICD-10-CM: E78.00 ICD-9-CM: 272.0 Menopause     ICD-10-CM: Z78.0 ICD-9-CM: 627.2 Osteopenia, unspecified location     ICD-10-CM: M85.80 ICD-9-CM: 733.90 Vitals BP Pulse Temp Resp Height(growth percentile) Weight(growth percentile) 118/78 (BP 1 Location: Left arm, BP Patient Position: At rest) 75 98 °F (36.7 °C) (Oral) 18 5' 3\" (1.6 m) 136 lb (61.7 kg) SpO2 BMI OB Status Smoking Status 99% 24.09 kg/m2 Menopause Never Smoker Vitals History BMI and BSA Data Body Mass Index Body Surface Area 24.09 kg/m 2 1.66 m 2 Preferred Pharmacy Pharmacy [place] [address] [facility] [address] [phone] Your Updated Medication List  
  
   
This list is accurate as of: 1/15/18 10:29 AM.  Always use your most recent med list.  
  
  
  
  
 atorvastatin 20 mg tablet Commonly known as:  LIPITOR  
TAKE 1 TABLET EVERY DAY  
  
 dicyclomine 10 mg capsule Commonly known as:  BENTYL  
  
 glucose blood VI test strips strip Commonly known as:  blood glucose test  
daily  
  
 omeprazole 20 mg capsule Commonly known as:  PRILOSEC Take 1 Cap by mouth daily. triamterene-hydroCHLOROthiazide 37.5-25 mg per tablet Commonly known as:  Mckeon Minus TAKE 1 TABLET EVERY DAY We Performed the Following HEMOGLOBIN A1C WITH EAG [00781 CPT(R)] LIPID PANEL [17569 CPT(R)] METABOLIC PANEL, COMPREHENSIVE [08691 CPT(R)] To-Do List   
 01/15/2018 Imaging:  DEXA BONE DENSITY STUDY AXIAL [facility] SERVICES! Dear Irina Hanson: Thank you for requesting a Farehelper account. Our records indicate that you already have an active Farehelper account. You can access your account anytime at [email or web address] Did you know that you can access your hospital and ER discharge instructions at any time in Farehelper? You can also review all of your test results from your hospital stay or ER visit. Additional Information If you have questions, please visit the Frequently Asked Questions section of the Farehelper website at [email or web address] Remember, Farehelper is NOT to be used for urgent needs.  For medical emergencies, dial 911. Now available from your iPhone and Android! Please provide this summary of care documentation to your next provider. Your primary care clinician is listed as [phone] [address] [place] [address] [address]. If you have any questions after today's visit, please call [phone].

## 2018-01-15 NOTE — PROGRESS NOTES
1. Have you been to the ER, urgent care clinic since your last visit? Hospitalized since your last visit? No    2. Have you seen or consulted any other health care providers outside of the [address] [place] [address] since your last visit? Include any pap smears or colon screening.  No   Chief Complaint   Patient presents with    Other     labs     Not fasting

## 2018-01-15 NOTE — PROGRESS NOTES
HISTORY OF PRESENT ILLNESS  Ruthann Fournier is a 75 y.o. female. HPI  Here for DM . She is well controlled on diet alone . She is off metformin at her request. Her HTN is controlled . She tolerates her statin for HLD. No CP. She is due for bone density testing. Past Medical History:   Diagnosis Date    Allergic rhinitis     Atrophic vaginitis     DM (diabetes mellitus) ([address]     HLD (hyperlipidemia)     Hypertension     Neuropathy     Osteoporosis     meds started 2007     Current Outpatient Prescriptions   Medication Sig    atorvastatin (LIPITOR) 20 mg tablet TAKE 1 TABLET EVERY DAY    dicyclomine (BENTYL) 10 mg capsule     omeprazole (PRILOSEC) 20 mg capsule Take 1 Cap by mouth daily.  triamterene-hydrochlorothiazide (MAXZIDE) 37.5-25 mg per tablet TAKE 1 TABLET EVERY DAY    glucose blood VI test strips (BLOOD GLUCOSE TEST) strip daily     No current facility-administered medications for this visit. Review of Systems   All other systems reviewed and are negative. Visit Vitals    /78 (BP 1 Location: Left arm, BP Patient Position: At rest)    Pulse 75    Temp 98 °F (36.7 °C) (Oral)    Resp 18    Ht 5' 3\" (1.6 m)    Wt 136 lb (61.7 kg)    SpO2 99%    BMI 24.09 kg/m2       Physical Exam   Constitutional: She appears well-developed and well-nourished. Cardiovascular: Normal rate, regular rhythm and normal heart sounds. Pulmonary/Chest: Effort normal and breath sounds normal. No respiratory distress. She has no wheezes. She has no rales. Nursing note and vitals reviewed.     Lab Results   Component Value Date/Time    Hemoglobin A1c 5.9 07/13/2017 11:11 AM     Lab Results   Component Value Date/Time    Cholesterol, total 142 07/13/2017 11:11 AM    HDL Cholesterol 57 07/13/2017 11:11 AM    LDL, calculated 72 07/13/2017 11:11 AM    VLDL, calculated 13 07/13/2017 11:11 AM    Triglyceride 65 07/13/2017 11:11 AM         ASSESSMENT and PLAN  Diagnoses and all orders for this visit: 1. Type 2 diabetes mellitus with diabetic polyneuropathy, without long-term current use of insulin (HCC)  -     HEMOGLOBIN A1C WITH EAG  The current medical regimen is effective;  continue present plan and medications. 2. Essential hypertension  -     METABOLIC PANEL, COMPREHENSIVE  The current medical regimen is effective;  continue present plan and medications. 3. Pure hypercholesterolemia  -     LIPID PANEL   The current medical regimen is effective;  continue present plan and medications. 4. Menopause  -     DEXA BONE DENSITY STUDY AXIAL; Future    5.  Osteopenia, unspecified location    Reviewed plan of care with the patient who has provided input and agrees with the goals

## 2018-01-16 LAB
ALBUMIN SERPL-MCNC: 4.4 G/DL (ref 3.6–4.8)
ALBUMIN/GLOB SERPL: 1.8 {RATIO} (ref 1.2–2.2)
ALP SERPL-CCNC: 39 IU/L (ref 39–117)
ALT SERPL-CCNC: 10 IU/L (ref 0–32)
AST SERPL-CCNC: 17 IU/L (ref 0–40)
BILIRUB SERPL-MCNC: 0.5 MG/DL (ref 0–1.2)
BUN SERPL-MCNC: 18 MG/DL (ref 8–27)
BUN/CREAT SERPL: 20 (ref 12–28)
CALCIUM SERPL-MCNC: 9.7 MG/DL (ref 8.7–10.3)
CHLORIDE SERPL-SCNC: 103 MMOL/L (ref 96–106)
CHOLEST SERPL-MCNC: 144 MG/DL (ref 100–199)
CO2 SERPL-SCNC: 26 MMOL/L (ref 18–29)
CREAT SERPL-MCNC: 0.91 MG/DL (ref 0.57–1)
EST. AVERAGE GLUCOSE BLD GHB EST-MCNC: 120 MG/DL
GLOBULIN SER CALC-MCNC: 2.4 G/DL (ref 1.5–4.5)
GLUCOSE SERPL-MCNC: 98 MG/DL (ref 65–99)
HBA1C MFR BLD: 5.8 % (ref 4.8–5.6)
HDLC SERPL-MCNC: 53 MG/DL
INTERPRETATION, 910389: NORMAL
LDLC SERPL CALC-MCNC: 78 MG/DL (ref 0–99)
Lab: NORMAL
POTASSIUM SERPL-SCNC: 4.4 MMOL/L (ref 3.5–5.2)
PROT SERPL-MCNC: 6.8 G/DL (ref 6–8.5)
SODIUM SERPL-SCNC: 142 MMOL/L (ref 134–144)
TRIGL SERPL-MCNC: 66 MG/DL (ref 0–149)
VLDLC SERPL CALC-MCNC: 13 MG/DL (ref 5–40)

## 2018-01-23 RX ORDER — ATORVASTATIN CALCIUM 20 MG/1
TABLET, FILM COATED ORAL
Qty: 90 TAB | Refills: 1 | Status: SHIPPED | OUTPATIENT
Start: 2018-01-23 | End: 2018-08-29 | Stop reason: SDUPTHER

## 2018-02-05 ENCOUNTER — HOSPITAL ENCOUNTER (OUTPATIENT)
Dept: MAMMOGRAPHY | Age: 68
Discharge: HOME | End: 2018-02-05
Payer: MEDICARE

## 2018-02-05 DIAGNOSIS — Z78.0 MENOPAUSE: ICD-10-CM

## 2018-02-05 PROCEDURE — 77080 DXA BONE DENSITY AXIAL: CPT

## 2018-02-10 NOTE — PROGRESS NOTES
Patient is still osteopenic. No significant changes when compared to previous studies. Please schedule her with Dr. Fahad Adams to discuss results and come up with plan.  thanks

## 2018-02-12 ENCOUNTER — TELEPHONE (OUTPATIENT)
Dept: INTERNAL MEDICINE CLINIC | Age: 68
End: 2018-02-12

## 2018-02-12 NOTE — TELEPHONE ENCOUNTER
Patient would like to be contacted in regards to bone density test results. She can be contacted at [phone].

## 2018-02-13 NOTE — TELEPHONE ENCOUNTER
Spoke with patient advised per Lizette Guzmán that patient is still osteopenic. No significant changes when compared to previous studies. Also advised patient to schedule an appointment to discuss results. Patient states that she will call back to schedule an appointment.

## 2018-07-09 ENCOUNTER — TELEPHONE (OUTPATIENT)
Dept: INTERNAL MEDICINE CLINIC | Age: 68
End: 2018-07-09

## 2018-07-09 NOTE — TELEPHONE ENCOUNTER
Spoke with Sayra Dunlap for [facility] and Ankle Advised unable to process an referral for patient since had not been seen 1/2018.  Verbalized understanding

## 2018-07-09 NOTE — TELEPHONE ENCOUNTER
Spoke with Army Johnson from [address] [place] [address] and Ankle she is calling again to request a referral for Pt to see the  Specialist- Pt has an upcoming Appt. with Anabella Tenorio on July 19  Call back# [phone]

## 2018-07-10 ENCOUNTER — TELEPHONE (OUTPATIENT)
Dept: INTERNAL MEDICINE CLINIC | Age: 68
End: 2018-07-10

## 2018-07-10 DIAGNOSIS — E11.42 TYPE 2 DIABETES MELLITUS WITH DIABETIC POLYNEUROPATHY, WITHOUT LONG-TERM CURRENT USE OF INSULIN (HCC): Primary | ICD-10-CM

## 2018-07-10 NOTE — TELEPHONE ENCOUNTER
Pt called in regards to her needing an authorization form her previous podiatry appointment that was on  . Doctor Dileep Officer office stated they need an new authorization form for Pt's annual podiatry visit, they usually received one from Dr. Joshua Wayne however due to him being  they need a new Authorization form. Pt also stated the practice said that we should have their fax number already on file. Pt stated if not approved she would have to pay an additional 55 dollars towards the podiatrist bill. So if possible can nurse call patient back to inform her if authorization was approved   Good callback number: Cell: [phone]  Romel: [phone]   if pt doesn't answer please leave a message she states.

## 2018-07-19 ENCOUNTER — OFFICE VISIT (OUTPATIENT)
Dept: INTERNAL MEDICINE CLINIC | Age: 68
End: 2018-07-19

## 2018-07-19 ENCOUNTER — HOSPITAL ENCOUNTER (OUTPATIENT)
Dept: LAB | Age: 68
Discharge: HOME | End: 2018-07-19
Payer: MEDICARE

## 2018-07-19 VITALS
WEIGHT: 141.6 LBS | DIASTOLIC BLOOD PRESSURE: 81 MMHG | SYSTOLIC BLOOD PRESSURE: 127 MMHG | HEIGHT: 63 IN | TEMPERATURE: 97.8 F | HEART RATE: 73 BPM | RESPIRATION RATE: 18 BRPM | OXYGEN SATURATION: 96 % | BODY MASS INDEX: 25.09 KG/M2

## 2018-07-19 DIAGNOSIS — I10 ESSENTIAL HYPERTENSION: ICD-10-CM

## 2018-07-19 DIAGNOSIS — L85.3 DRY SKIN: ICD-10-CM

## 2018-07-19 DIAGNOSIS — N76.0 ACUTE VAGINITIS: ICD-10-CM

## 2018-07-19 DIAGNOSIS — Z11.59 ENCOUNTER FOR HEPATITIS C SCREENING TEST FOR LOW RISK PATIENT: ICD-10-CM

## 2018-07-19 DIAGNOSIS — N95.2 ATROPHIC VAGINITIS: ICD-10-CM

## 2018-07-19 DIAGNOSIS — Z12.4 SCREENING FOR CERVICAL CANCER: ICD-10-CM

## 2018-07-19 DIAGNOSIS — Z71.89 ACP (ADVANCE CARE PLANNING): ICD-10-CM

## 2018-07-19 DIAGNOSIS — E78.00 PURE HYPERCHOLESTEROLEMIA: ICD-10-CM

## 2018-07-19 DIAGNOSIS — E11.42 TYPE 2 DIABETES MELLITUS WITH DIABETIC POLYNEUROPATHY, WITHOUT LONG-TERM CURRENT USE OF INSULIN (HCC): ICD-10-CM

## 2018-07-19 DIAGNOSIS — Z77.21 PATIENT EXPOSURE TO BODY FLUIDS: ICD-10-CM

## 2018-07-19 DIAGNOSIS — Z00.00 WELLNESS EXAMINATION: Primary | ICD-10-CM

## 2018-07-19 PROCEDURE — 88142 CYTOPATH C/V THIN LAYER: CPT | Performed by: PHYSICIAN ASSISTANT

## 2018-07-19 PROCEDURE — 87624 HPV HI-RISK TYP POOLED RSLT: CPT | Performed by: PHYSICIAN ASSISTANT

## 2018-07-19 RX ORDER — FLUCONAZOLE 150 MG/1
150 TABLET ORAL
Qty: 2 TAB | Refills: 0 | Status: SHIPPED | OUTPATIENT
Start: 2018-07-19 | End: 2018-07-20 | Stop reason: SDUPTHER

## 2018-07-19 NOTE — MR AVS SNAPSHOT
[address] [place] [address] [address] [place] [address] 
[phone] Patient: Екатерина Saxena MRN:  :[id number] Visit Information Date & Time Provider Department Dept. Phone Encounter #  
 2018  1:15 PM Luisa Stacy PA-C [facility] [phone] [id number] Upcoming Health Maintenance Date Due Hepatitis C Screening 1950 EYE EXAM RETINAL OR DILATED Q1 10/19/1959 ZOSTER VACCINE AGE 60> 2009 FOOT EXAM Q1 2018 MICROALBUMIN Q1 2018 MEDICARE YEARLY EXAM 2018 HEMOGLOBIN A1C Q6M 7/15/2018 GLAUCOMA SCREENING Q2Y 2018 LIPID PANEL Q1 1/15/2019 DTaP/Tdap/Td series (2 - Td) 2019 BREAST CANCER SCRN MAMMOGRAM 2019 COLONOSCOPY 2021 Allergies as of 2018  Review Complete On: 2018 By: Luisa Stacy PA-C No Known Allergies Current Immunizations  Reviewed on 2015 Name Date Influenza High Dose Vaccine PF 10/15/2017 Pneumococcal Conjugate (PCV-13) 3/2/2015  2:34 PM  
 Pneumococcal Polysaccharide (PPSV-23) 2014 11:12 AM  
 TDAP Vaccine 2009 Not reviewed this visit You Were Diagnosed With   
  
 Codes Comments Wellness examination    -  Primary ICD-10-CM: Z00.00 ICD-9-CM: V70.0 Screening for cervical cancer     ICD-10-CM: Z12.4 ICD-9-CM: V76.2 Acute vaginitis     ICD-10-CM: N76.0 ICD-9-CM: 616.10 Encounter for hepatitis C screening test for low risk patient     ICD-10-CM: Z11.59 
ICD-9-CM: V73.89 Type 2 diabetes mellitus with diabetic polyneuropathy, without long-term current use of insulin (HCC)     ICD-10-CM: E11.42 
ICD-9-CM: 250.60, 357.2 Essential hypertension     ICD-10-CM: I10 
ICD-9-CM: 401.9 Atrophic vaginitis     ICD-10-CM: N95.2 ICD-9-CM: 627.3 Pure hypercholesterolemia     ICD-10-CM: E78.00 ICD-9-CM: 272.0 Dry skin     ICD-10-CM: L85.3 ICD-9-CM: 701.1 ACP (advance care planning)     ICD-10-CM: Z71.89 ICD-9-CM: V65.49 Patient exposure to body fluids     ICD-10-CM: Z77.21 
ICD-9-CM: V15.85 Vitals BP Pulse Temp Resp Height(growth percentile) Weight(growth percentile) 127/81 (BP 1 Location: Left arm, BP Patient Position: At rest) 73 97.8 °F (36.6 °C) (Oral) 18 5' 3\" (1.6 m) 141 lb 9.6 oz (64.2 kg) SpO2 BMI OB Status Smoking Status 96% 25.08 kg/m2 Menopause Never Smoker BMI and BSA Data Body Mass Index Body Surface Area 25.08 kg/m 2 1.69 m 2 Preferred Pharmacy Pharmacy Name Phone Nilda Alejandra , [place] [id number] [facility] [address] [place] [address] [phone] Your Updated Medication List  
  
   
This list is accurate as of 7/19/18  1:51 PM.  Always use your most recent med list.  
  
  
  
  
 atorvastatin 20 mg tablet Commonly known as:  LIPITOR  
TAKE 1 TABLET EVERY DAY  
  
 dicyclomine 10 mg capsule Commonly known as:  BENTYL  
  
 fluconazole 150 mg tablet Commonly known as:  DIFLUCAN Take 1 Tab by mouth every seven (7) days for 2 doses. Take one by mouth weekly. glucose blood VI test strips strip Commonly known as:  blood glucose test  
daily  
  
 omeprazole 20 mg capsule Commonly known as:  PRILOSEC Take 1 Cap by mouth daily. triamterene-hydroCHLOROthiazide 37.5-25 mg per tablet Commonly known as:  Jules Branson TAKE 1 TABLET EVERY DAY Prescriptions Sent to Pharmacy Refills  
 fluconazole (DIFLUCAN) 150 mg tablet 0 Sig: Take 1 Tab by mouth every seven (7) days for 2 doses. Take one by mouth weekly. Class: Normal  
 Pharmacy: [address] [place] [address], [facility]  #: [phone] Route: Oral  
  
We Performed the Following CBC WITH AUTOMATED DIFF [19614 CPT(R)] HCV AB W/RFLX TO JOSELITO [46320 CPT(R)] HEMOGLOBIN A1C W/O EAG [95992 CPT(R)]  DIABETES FOOT EXAM [7 Custom] METABOLIC PANEL, COMPREHENSIVE [40303 CPT(R)] MICROALBUMIN, UR, RAND W/ MICROALB/CREAT RATIO [id number] CPT(R)] [address] [id number] Custom] PAP + HPV DNA (HIGH RISK) [62936 CPT(R)] TSH RFX ON ABNORMAL TO FREE T4 [[id number] Custom] Introducing [facility]! Dear Miah Collins: Thank you for requesting a Gradeable account. Our records indicate that you already have an active Gradeable account. You can access your account anytime at [email or web address] Did you know that you can access your hospital and ER discharge instructions at any time in Gradeable? You can also review all of your test results from your hospital stay or ER visit. Additional Information If you have questions, please visit the Frequently Asked Questions section of the Gradeable website at [email or web address] Remember, Gradeable is NOT to be used for urgent needs. For medical emergencies, dial 911. Now available from your iPhone and Android! Please provide this summary of care documentation to your next provider. Your primary care clinician is listed as [id number] [address] [place] [address] [address]. If you have any questions after today's visit, please call [phone].

## 2018-07-19 NOTE — PROGRESS NOTES
1. Have you been to the ER, urgent care clinic since your last visit? Hospitalized since your last visit? No    2. Have you seen or consulted any other health care providers outside of the [address] [place] [address] since your last visit? Include any pap smears or colon screening.  No   Chief Complaint   Patient presents with    Complete Physical     Not fasting

## 2018-07-19 NOTE — PROGRESS NOTES
HISTORY OF PRESENT ILLNESS  Bill Morris is a 75 y.o. female. Chief Complaint   Patient presents with    Complete Physical     Health Maintenance Due   Topic Date Due    Hepatitis C Screening  1950    EYE EXAM RETINAL OR DILATED Q1  10/19/1959    ZOSTER VACCINE AGE 60>  08/19/2009    FOOT EXAM Q1  07/13/2018    MICROALBUMIN Q1  07/13/2018    MEDICARE YEARLY EXAM  07/14/2018    HEMOGLOBIN A1C Q6M  07/15/2018    GLAUCOMA SCREENING Q2Y  08/01/2018     HPI   DM II - Well controlled at last check in Jan.   Lab Results   Component Value Date/Time    Hemoglobin A1c 5.8 (H) 01/15/2018 11:00 AM     Lab Results   Component Value Date/Time    Cholesterol, total 144 01/15/2018 11:00 AM    HDL Cholesterol 53 01/15/2018 11:00 AM    LDL, calculated 78 01/15/2018 11:00 AM    VLDL, calculated 13 01/15/2018 11:00 AM    Triglyceride 66 01/15/2018 11:00 AM     Lab Results   Component Value Date/Time    Microalb/Creat ratio (ug/mg creat.) 7.5 07/13/2017 11:11 AM     Wt Readings from Last 3 Encounters:   07/19/18 141 lb 9.6 oz (64.2 kg)   01/15/18 136 lb (61.7 kg)   07/27/17 136 lb 6.4 oz (61.9 kg)   Pt has gained 5 lbs in the past 6 months. Vaginal irritation x several days. Worse after intercourse with . Previously given diflucan for yeast infection 3 months ago. Skin very dry - has tried many lotions, but skin is still scaling. Exercising frequently and staying hydrated. Urine is clear at least once/day. This is the Subsequent Medicare Annual Wellness Exam, performed 12 months or more after the Initial AWV or the last Subsequent AWV    I have reviewed the patient's medical history in detail and updated the computerized patient record.      History     Past Medical History:   Diagnosis Date    Allergic rhinitis     Atrophic vaginitis     DM (diabetes mellitus) ([address]     HLD (hyperlipidemia)     Hypertension     Neuropathy     Osteoporosis     meds started 2007      Past Surgical History: Procedure Laterality Date    HX COLONOSCOPY  2011     Current Outpatient Prescriptions   Medication Sig Dispense Refill    atorvastatin (LIPITOR) 20 mg tablet TAKE 1 TABLET EVERY DAY 90 Tab 1    triamterene-hydrochlorothiazide (MAXZIDE) 37.5-25 mg per tablet TAKE 1 TABLET EVERY DAY 90 Tab 3    dicyclomine (BENTYL) 10 mg capsule       omeprazole (PRILOSEC) 20 mg capsule Take 1 Cap by mouth daily. 90 Cap 1    glucose blood VI test strips (BLOOD GLUCOSE TEST) strip daily 1 Each 11     No Known Allergies  Family History   Problem Relation Age of Onset    Breast Cancer Mother 48     breast    Diabetes Brother     Diabetes Brother     Hypertension Brother      Social History   Substance Use Topics    Smoking status: Never Smoker    Smokeless tobacco: Never Used    Alcohol use Yes     Patient Active Problem List   Diagnosis Code    Common migraine G43.009    GERD (gastroesophageal reflux disease) K21.9    Osteopenia M85.80    Atrophic vaginitis N95.2    Allergic rhinitis J30.9    Type 2 diabetes mellitus with diabetic polyneuropathy ([facility] [address] [facility] E11.42    Essential hypertension I10    Pure hypercholesterolemia E78.00    Living will, counseling/discussion Z71.89       Depression Risk Factor Screening:     PHQ over the last two weeks 7/19/2018   Little interest or pleasure in doing things Not at all   Feeling down, depressed, irritable, or hopeless Not at all   Total Score PHQ 2 0     Alcohol Risk Factor Screening: You do not drink alcohol or very rarely. Functional Ability and Level of Safety:   Hearing Loss  Hearing is good. Activities of Daily Living  The home contains: handrails and grab bars  Patient does total self care    Fall Risk  Fall Risk Assessment, last 12 mths 7/19/2018   Able to walk? Yes   Fall in past 12 months?  No   Fall with injury? -   Number of falls in past 12 months -   Fall Risk Score -       Abuse Screen  Patient is not abused    Cognitive Screening   Evaluation of Cognitive Function:  Has your family/caregiver stated any concerns about your memory: no  Normal    Patient Care Team   Patient Care Team:  Kylie Torres PA-C as PCP - General (Physician Assistant)  Timothy Holbrook MD (Orthopedic Surgery)  Jeremy Lamb MD (Orthopedic Surgery)  Dr. Zacarias Lancaster (Ophthalmology)  Tigist Jansen MD (Gynecology)    Assessment/Plan   Education and counseling provided:  Are appropriate based on today's review and evaluation    Health Maintenance Due   Topic Date Due    Hepatitis C Screening  1950    EYE EXAM RETINAL OR DILATED Q1  10/19/1959    ZOSTER VACCINE AGE 60>  08/19/2009    FOOT EXAM Q1  07/13/2018    MICROALBUMIN Q1  07/13/2018    MEDICARE YEARLY EXAM  07/14/2018    HEMOGLOBIN A1C Q6M  07/15/2018    GLAUCOMA SCREENING Q2Y  08/01/2018     Advance Care Planning (ACP) Provider Note - Comprehensive     Date of ACP Conversation: 07/19/18  Persons included in Conversation:  patient  Length of ACP Conversation in minutes:  <16 minutes (Non-Billable)    Authorized Decision Maker (if patient is incapable of making informed decisions): This person is: Other Legally Authorized Decision Maker (e.g. Next of Kin)  Spouse         General ACP for ALL Patients with Decision Making Capacity:   Importance of advance care planning, including choosing a healthcare agent to communicate patient's healthcare decisions if patient lost the ability to make decisions, such as after a sudden illness or accident  Understanding of the healthcare agent role was assessed and information provided    Review of Existing Advance Directive:  None currently in place. Form given today.      For Serious or Chronic Illness:  Understanding of medical condition      Interventions Provided:  Recommended completion of Advance Directive form after review of ACP materials and conversation with prospective healthcare agent   Recommended communicating the plan and making copies for the healthcare agent, personal physician, and others as appropriate (e.g., health system)  Recommended review of completed ACP document annually or upon change in health status    Review of Systems   Constitutional: Negative for fever and weight loss. HENT: Negative for congestion, hearing loss and sore throat. Eyes: Negative for blurred vision. Respiratory: Negative for cough and shortness of breath. Cardiovascular: Negative for chest pain, palpitations and leg swelling. Gastrointestinal: Negative for abdominal pain, blood in stool, constipation, diarrhea, heartburn, melena, nausea and vomiting. Genitourinary: Negative for dysuria, frequency, hematuria and urgency. Musculoskeletal: Negative for back pain, falls, joint pain and neck pain. Skin: Positive for itching. Negative for rash. Neurological: Negative for dizziness, seizures, loss of consciousness, weakness and headaches. Endo/Heme/Allergies: Negative for environmental allergies. Psychiatric/Behavioral: Negative for depression and substance abuse. The patient is not nervous/anxious and does not have insomnia. Physical Exam   Constitutional: She is oriented to person, place, and time. She appears well-developed and well-nourished. No distress. HENT:   Head: Normocephalic and atraumatic. Right Ear: External ear normal.   Left Ear: External ear normal.   Nose: Nose normal.   Mouth/Throat: Oropharynx is clear and moist.   Eyes: Conjunctivae are normal.   Neck: Neck supple. No JVD present. No thyromegaly present. Cardiovascular: Normal rate, regular rhythm and normal heart sounds. Pulmonary/Chest: Effort normal and breath sounds normal. No respiratory distress. Abdominal: Soft. Bowel sounds are normal. She exhibits no distension and no mass. There is no tenderness. There is no rebound and no guarding. Genitourinary: Vaginal discharge found. Genitourinary Comments: Vaginal mucosa irritated - slightly red and inflamed. Cervix WNL.  Small amount of thick white vaginal discharge noted. No unusual odor. Musculoskeletal: She exhibits no edema. Lymphadenopathy:     She has no cervical adenopathy. Neurological: She is alert and oriented to person, place, and time. Skin: Skin is warm and dry. Psychiatric: She has a normal mood and affect. Her behavior is normal. Judgment and thought content normal.   Nursing note and vitals reviewed. Diabetic foot exam:     Left: Reflexes 1+     Vibratory sensation normal    Proprioception normal   Sharp/dull discrimination normal    Filament test normal sensation with micro filament   Pulse DP: 2+ (normal)   Pulse PT: 2+ (normal)   Deformities: Mild - callus throughout B dorsal feet  Right: Reflexes 1+   Vibratory sensation normal   Proprioception normal   Sharp/dull discrimination normal   Filament test normal sensation with micro filament   Pulse DP: 2+ (normal)   Pulse PT: 2+ (normal)   Deformities: Mild - callus throughout B dorsal feet    ASSESSMENT and PLAN  Diagnoses and all orders for this visit:    1. Wellness examination - see above. 2. Screening for cervical cancer  -     PAP + HPV DNA (HIGH RISK)    3. Acute vaginitis - suspect Yeast  -     NUSWAB VAGINITIS PLUS  -     fluconazole (DIFLUCAN) 150 mg tablet; Take 1 Tab by mouth every seven (7) days for 2 doses. Take one by mouth weekly. 4. Encounter for hepatitis C screening test for low risk patient  -     HCV AB W/RFLX TO JOSELITO    5. Type 2 diabetes mellitus with diabetic polyneuropathy, without long-term current use of insulin (HCC)  -     CBC WITH AUTOMATED DIFF  -     METABOLIC PANEL, COMPREHENSIVE  -     HEMOGLOBIN A1C W/O EAG  -     TSH RFX ON ABNORMAL TO FREE T4  -     MICROALBUMIN, UR, RAND W/ MICROALB/CREAT RATIO  -      DIABETES FOOT EXAM    6. Essential hypertension - controlled. 7. Atrophic vaginitis - not bothersome except with yeast infections. Pt declines estrace at this time.      8. Pure hypercholesterolemia - controlled at check in Jan. 9. Dry skin - discussed avoiding cleaning entire body with soap and using oil after bathing. 10. ACP (advance care planning) - see above.      11. Patient exposure to body fluids  -     NUSWAB VAGINITIS PLUS

## 2018-07-20 DIAGNOSIS — N76.0 ACUTE VAGINITIS: ICD-10-CM

## 2018-07-20 RX ORDER — FLUCONAZOLE 150 MG/1
150 TABLET ORAL
Qty: 2 TAB | Refills: 0 | Status: SHIPPED | OUTPATIENT
Start: 2018-07-20 | End: 2018-07-28

## 2018-07-21 LAB
ALBUMIN SERPL-MCNC: 4.5 G/DL (ref 3.6–4.8)
ALBUMIN/CREAT UR: <12.4 MG/G CREAT (ref 0–30)
ALBUMIN/GLOB SERPL: 2 {RATIO} (ref 1.2–2.2)
ALP SERPL-CCNC: 39 IU/L (ref 39–117)
ALT SERPL-CCNC: 14 IU/L (ref 0–32)
AST SERPL-CCNC: 17 IU/L (ref 0–40)
BASOPHILS # BLD AUTO: 0 X10E3/UL (ref 0–0.2)
BASOPHILS NFR BLD AUTO: 1 %
BILIRUB SERPL-MCNC: 0.4 MG/DL (ref 0–1.2)
BUN SERPL-MCNC: 14 MG/DL (ref 8–27)
BUN/CREAT SERPL: 16 (ref 12–28)
CALCIUM SERPL-MCNC: 9.8 MG/DL (ref 8.7–10.3)
CHLORIDE SERPL-SCNC: 101 MMOL/L (ref 96–106)
CO2 SERPL-SCNC: 24 MMOL/L (ref 20–29)
CREAT SERPL-MCNC: 0.86 MG/DL (ref 0.57–1)
CREAT UR-MCNC: 24.2 MG/DL
EOSINOPHIL # BLD AUTO: 0.1 X10E3/UL (ref 0–0.4)
EOSINOPHIL NFR BLD AUTO: 3 %
ERYTHROCYTE [DISTWIDTH] IN BLOOD BY AUTOMATED COUNT: 15.1 % (ref 12.3–15.4)
GLOBULIN SER CALC-MCNC: 2.2 G/DL (ref 1.5–4.5)
GLUCOSE SERPL-MCNC: 84 MG/DL (ref 65–99)
HBA1C MFR BLD: 6 % (ref 4.8–5.6)
HCT VFR BLD AUTO: 37.9 % (ref 34–46.6)
HCV AB S/CO SERPL IA: <0.1 S/CO RATIO (ref 0–0.9)
HCV AB SERPL QL IA: NORMAL
HGB BLD-MCNC: 12.2 G/DL (ref 11.1–15.9)
IMM GRANULOCYTES # BLD: 0 X10E3/UL (ref 0–0.1)
IMM GRANULOCYTES NFR BLD: 0 %
LYMPHOCYTES # BLD AUTO: 2 X10E3/UL (ref 0.7–3.1)
LYMPHOCYTES NFR BLD AUTO: 41 %
MCH RBC QN AUTO: 25.4 PG (ref 26.6–33)
MCHC RBC AUTO-ENTMCNC: 32.2 G/DL (ref 31.5–35.7)
MCV RBC AUTO: 79 FL (ref 79–97)
MICROALBUMIN UR-MCNC: <3 UG/ML
MONOCYTES # BLD AUTO: 0.3 X10E3/UL (ref 0.1–0.9)
MONOCYTES NFR BLD AUTO: 5 %
NEUTROPHILS # BLD AUTO: 2.5 X10E3/UL (ref 1.4–7)
NEUTROPHILS NFR BLD AUTO: 50 %
PLATELET # BLD AUTO: 299 X10E3/UL (ref 150–379)
POTASSIUM SERPL-SCNC: 4.1 MMOL/L (ref 3.5–5.2)
PROT SERPL-MCNC: 6.7 G/DL (ref 6–8.5)
RBC # BLD AUTO: 4.81 X10E6/UL (ref 3.77–5.28)
SODIUM SERPL-SCNC: 140 MMOL/L (ref 134–144)
TSH SERPL DL<=0.005 MIU/L-ACNC: 1.37 UIU/ML (ref 0.45–4.5)
WBC # BLD AUTO: 5 X10E3/UL (ref 3.4–10.8)

## 2018-07-25 NOTE — PROGRESS NOTES
Hi Ms. Lyle Leisa,   Your pap smear is normal, so you don't need to do any further pap smears. You did, as we suspected, have a yeast infection, so hopefully that Diflucan is helping!    Atul Chambers

## 2018-07-27 LAB
A VAGINAE DNA VAG QL NAA+PROBE: ABNORMAL SCORE
BVAB2 DNA VAG QL NAA+PROBE: ABNORMAL SCORE
C ALBICANS DNA VAG QL NAA+PROBE: POSITIVE
C GLABRATA DNA VAG QL NAA+PROBE: NEGATIVE
C TRACH RRNA SPEC QL NAA+PROBE: NEGATIVE
MEGA1 DNA VAG QL NAA+PROBE: ABNORMAL SCORE
N GONORRHOEA RRNA SPEC QL NAA+PROBE: NEGATIVE
T VAGINALIS RRNA SPEC QL NAA+PROBE: NEGATIVE

## 2018-07-27 NOTE — PROGRESS NOTES
STD testing is negative. (good!) Yeast test is positive as we expected. Diflucan should help with this. Hope you're feeling better!    Juanis Logan

## 2018-08-29 RX ORDER — ATORVASTATIN CALCIUM 20 MG/1
TABLET, FILM COATED ORAL
Qty: 90 TAB | Refills: 1 | Status: SHIPPED | OUTPATIENT
Start: 2018-08-29 | End: 2019-10-07 | Stop reason: SDUPTHER

## 2018-12-20 ENCOUNTER — HOSPITAL ENCOUNTER (OUTPATIENT)
Dept: MAMMOGRAPHY | Age: 68
Discharge: HOME | End: 2018-12-20
Attending: PHYSICIAN ASSISTANT
Payer: MEDICARE

## 2018-12-20 DIAGNOSIS — Z12.39 BREAST SCREENING: ICD-10-CM

## 2018-12-20 PROCEDURE — 77067 SCR MAMMO BI INCL CAD: CPT

## 2019-01-18 ENCOUNTER — OFFICE VISIT (OUTPATIENT)
Dept: INTERNAL MEDICINE CLINIC | Age: 69
End: 2019-01-18

## 2019-01-18 VITALS
HEIGHT: 63 IN | OXYGEN SATURATION: 98 % | WEIGHT: 141.4 LBS | DIASTOLIC BLOOD PRESSURE: 75 MMHG | TEMPERATURE: 98 F | BODY MASS INDEX: 25.05 KG/M2 | HEART RATE: 77 BPM | SYSTOLIC BLOOD PRESSURE: 123 MMHG | RESPIRATION RATE: 18 BRPM

## 2019-01-18 DIAGNOSIS — E11.42 TYPE 2 DIABETES MELLITUS WITH DIABETIC POLYNEUROPATHY, WITHOUT LONG-TERM CURRENT USE OF INSULIN (HCC): Primary | ICD-10-CM

## 2019-01-18 NOTE — PROGRESS NOTES
Dmitri Yeung is a 70 y.o. female Chief Complaint Patient presents with  Diabetes  
  follow up Health Maintenance Due Topic Date Due  
 EYE EXAM RETINAL OR DILATED  10/19/1959  Shingrix Vaccine Age 50> (1 of 2) 10/19/1999  GLAUCOMA SCREENING Q2Y  08/01/2018  LIPID PANEL Q1  01/15/2019  
 HEMOGLOBIN A1C Q6M  01/19/2019 HPI 
DM II - Due for follow up labs. Well controlled at last check:  
Lab Results Component Value Date/Time Hemoglobin A1c 6.0 (H) 07/19/2018 02:09 PM  
 
Lab Results Component Value Date/Time Cholesterol, total 144 01/15/2018 11:00 AM  
 HDL Cholesterol 53 01/15/2018 11:00 AM  
 LDL, calculated 78 01/15/2018 11:00 AM  
 VLDL, calculated 13 01/15/2018 11:00 AM  
 Triglyceride 66 01/15/2018 11:00 AM  
 
Lab Results Component Value Date/Time Microalb/Creat ratio (ug/mg creat.) <12.4 07/19/2018 02:09 PM  
 
Wt Readings from Last 3 Encounters:  
01/18/19 141 lb 6.4 oz (64.1 kg) 07/19/18 141 lb 9.6 oz (64.2 kg) 01/15/18 136 lb (61.7 kg) Planning to change diet/exercise to lose weight. Seeing podiatry - having tingling in toes and fingers. Some foot pain. Not severe enough to want to take daily medication at this time. Review of Systems Respiratory: Negative for shortness of breath. Cardiovascular: Negative for chest pain, palpitations and leg swelling. Neurological: Positive for tingling. Negative for dizziness, loss of consciousness and weakness. Physical Exam  
Constitutional: She is oriented to person, place, and time. She appears well-developed and well-nourished. No distress. HENT:  
Head: Normocephalic and atraumatic. Neck: Neck supple. No JVD present. No bruit bilateral carotid arteries. Cardiovascular: Normal rate, regular rhythm and normal heart sounds. Pulmonary/Chest: Effort normal and breath sounds normal. No respiratory distress. Musculoskeletal: She exhibits no edema. Neurological: She is alert and oriented to person, place, and time. Coordination normal.  
Skin: Skin is warm and dry. Psychiatric: She has a normal mood and affect. Her behavior is normal. Judgment and thought content normal.  
Nursing note and vitals reviewed. Diagnoses and all orders for this visit: 
 
1. Type 2 diabetes mellitus with diabetic polyneuropathy, without long-term current use of insulin ([facility] [address] -     LIPID PANEL 
-     METABOLIC PANEL, COMPREHENSIVE 
-     HEMOGLOBIN A1C W/O EAG

## 2019-01-18 NOTE — PROGRESS NOTES
1. Have you been to the ER, urgent care clinic since your last visit? Hospitalized since your last visit? No 
 
2. Have you seen or consulted any other health care providers outside of the [address] [place] [address] since your last visit? Include any pap smears or colon screening. No  
Chief Complaint Patient presents with  Diabetes  
  follow up Fasting Abuse Screening Questionnaire 1/18/2019 Do you ever feel afraid of your partner? Shreya Lower Are you in a relationship with someone who physically or mentally threatens you? Shreya Lower Is it safe for you to go home? Y  
 
PHQ over the last two weeks 1/18/2019 Little interest or pleasure in doing things Not at all Feeling down, depressed, irritable, or hopeless Not at all Total Score PHQ 2 0 Discussed fall risk assessment with patient and provided fall risk prevention reference.

## 2019-01-19 LAB
ALBUMIN SERPL-MCNC: 4.8 G/DL (ref 3.6–4.8)
ALBUMIN/GLOB SERPL: 2.1 {RATIO} (ref 1.2–2.2)
ALP SERPL-CCNC: 46 IU/L (ref 39–117)
ALT SERPL-CCNC: 6 IU/L (ref 0–32)
AST SERPL-CCNC: 15 IU/L (ref 0–40)
BILIRUB SERPL-MCNC: 0.3 MG/DL (ref 0–1.2)
BUN SERPL-MCNC: 17 MG/DL (ref 8–27)
BUN/CREAT SERPL: 18 (ref 12–28)
CALCIUM SERPL-MCNC: 9.6 MG/DL (ref 8.7–10.3)
CHLORIDE SERPL-SCNC: 102 MMOL/L (ref 96–106)
CHOLEST SERPL-MCNC: 133 MG/DL (ref 100–199)
CO2 SERPL-SCNC: 20 MMOL/L (ref 20–29)
CREAT SERPL-MCNC: 0.92 MG/DL (ref 0.57–1)
GLOBULIN SER CALC-MCNC: 2.3 G/DL (ref 1.5–4.5)
GLUCOSE SERPL-MCNC: 102 MG/DL (ref 65–99)
HBA1C MFR BLD: 6.1 % (ref 4.8–5.6)
HDLC SERPL-MCNC: 45 MG/DL
INTERPRETATION, 910389: NORMAL
LDLC SERPL CALC-MCNC: 76 MG/DL (ref 0–99)
Lab: NORMAL
POTASSIUM SERPL-SCNC: 4 MMOL/L (ref 3.5–5.2)
PROT SERPL-MCNC: 7.1 G/DL (ref 6–8.5)
SODIUM SERPL-SCNC: 141 MMOL/L (ref 134–144)
TRIGL SERPL-MCNC: 60 MG/DL (ref 0–149)
VLDLC SERPL CALC-MCNC: 12 MG/DL (ref 5–40)

## 2019-03-04 RX ORDER — TRIAMTERENE/HYDROCHLOROTHIAZID 37.5-25 MG
TABLET ORAL
Qty: 90 TAB | Refills: 3 | Status: SHIPPED | OUTPATIENT
Start: 2019-03-04 | End: 2019-10-07 | Stop reason: SDUPTHER

## 2019-03-29 ENCOUNTER — OFFICE VISIT (OUTPATIENT)
Dept: INTERNAL MEDICINE CLINIC | Age: 69
End: 2019-03-29

## 2019-03-29 VITALS
SYSTOLIC BLOOD PRESSURE: 130 MMHG | OXYGEN SATURATION: 99 % | BODY MASS INDEX: 25.05 KG/M2 | HEIGHT: 63 IN | WEIGHT: 141.4 LBS | HEART RATE: 80 BPM | TEMPERATURE: 98.4 F | DIASTOLIC BLOOD PRESSURE: 72 MMHG | RESPIRATION RATE: 16 BRPM

## 2019-03-29 DIAGNOSIS — R30.0 DYSURIA: Primary | ICD-10-CM

## 2019-03-29 DIAGNOSIS — N76.0 ACUTE VAGINITIS: ICD-10-CM

## 2019-03-29 LAB
BILIRUB UR QL STRIP: NEGATIVE
GLUCOSE UR-MCNC: NEGATIVE MG/DL
KETONES P FAST UR STRIP-MCNC: NEGATIVE MG/DL
PH UR STRIP: 5.5 [PH] (ref 4.6–8)
PROT UR QL STRIP: NEGATIVE
SP GR UR STRIP: 1.02 (ref 1–1.03)
UA UROBILINOGEN AMB POC: NORMAL (ref 0.2–1)
URINALYSIS CLARITY POC: CLEAR
URINALYSIS COLOR POC: YELLOW
URINE BLOOD POC: NEGATIVE
URINE LEUKOCYTES POC: NEGATIVE
URINE NITRITES POC: NEGATIVE

## 2019-03-29 NOTE — PROGRESS NOTES
Janes Arango is a 70 y.o. female  Chief Complaint   Patient presents with    Urinary Odor     follow up for about 2 weeks        Health Maintenance Due   Topic Date Due    EYE EXAM RETINAL OR DILATED  10/19/1959    Shingrix Vaccine Age 50> (1 of 2) 10/19/1999    GLAUCOMA SCREENING Q2Y  08/01/2018     HPI  Urinary odor x 2 weeks. Had vaginal dryness/odor & discharge. Took Diflucan 150 mg 3/21/19, and sx improved somewhat. Now, has some clear discharge. Review of Systems   Constitutional: Negative for fever. Gastrointestinal: Negative for abdominal pain. Genitourinary: Negative for dysuria, flank pain, frequency, hematuria and urgency. + clear vaginal discharge and irritation. Physical Exam   Constitutional: She is oriented to person, place, and time. She appears well-developed and well-nourished. No distress. HENT:   Head: Normocephalic and atraumatic. Neck: Neck supple. No JVD present. No bruit bilateral carotid arteries. Cardiovascular: Normal rate, regular rhythm and normal heart sounds. Pulmonary/Chest: Effort normal and breath sounds normal. No respiratory distress. Genitourinary: Uterus normal. No vaginal discharge found. Genitourinary Comments: Urine with odor noted. External vaginal mucosa red, not inflamed. Musculoskeletal: She exhibits no edema. Neurological: She is alert and oriented to person, place, and time. Skin: Skin is warm and dry. Psychiatric: She has a normal mood and affect. Her behavior is normal. Judgment and thought content normal.   Nursing note and vitals reviewed. Diagnoses and all orders for this visit:    1. Dysuria  -     AMB POC URINALYSIS DIP STICK AUTO W/O MICRO - WNL  Suspect this is related to vaginitis, not urological source    2.  Acute vaginitis - suspect BV but await culture results before treating  -     Benjamín Licona

## 2019-04-02 LAB
A VAGINAE DNA VAG QL NAA+PROBE: NORMAL SCORE
BVAB2 DNA VAG QL NAA+PROBE: NORMAL SCORE
C ALBICANS DNA VAG QL NAA+PROBE: NEGATIVE
C GLABRATA DNA VAG QL NAA+PROBE: NEGATIVE
MEGA1 DNA VAG QL NAA+PROBE: NORMAL SCORE
T VAGINALIS RRNA SPEC QL NAA+PROBE: NEGATIVE

## 2019-04-02 NOTE — PROGRESS NOTES
Vaginal swab results are normal. Are you still having odor? If so, I suggest that you schedule an appointment with gynecology.  I'd suggest either of these groups:     [id number] [facility], [address]  Phone: [phone] OB/GYN  [facility], [address], [address] [address]  Phone: [phone]

## 2019-05-08 ENCOUNTER — TELEPHONE (OUTPATIENT)
Dept: INTERNAL MEDICINE CLINIC | Age: 69
End: 2019-05-08

## 2019-05-08 NOTE — TELEPHONE ENCOUNTER
Spoke with patient . Advised per Shelbi Reeves PA-C that per her DEXA scan 1/2018, she has osteopenia (\"pre-osteoporosis\"), not osteoporosis. Her previous osteoporosis has improved. She is doing the right thing with calcium and vitamin D. Goal: OTC Vitamin D3 5000 units once daily, OTC Calcium Citrate 500 mg twice daily, and increase weight bearing exercise. We should recheck her DEXA scan in 2020.  patient verbalized understanding

## 2019-05-08 NOTE — TELEPHONE ENCOUNTER
Please reassure pt that per her DEXA scan 1/2018, she has osteopenia (\"pre-osteoporosis\"), not osteoporosis. Her previous osteoporosis has improved. She is doing the right thing with calcium and vitamin D. Goal: OTC Vitamin D3 5000 units once daily, OTC Calcium Citrate 500 mg twice daily, and increase weight bearing exercise. We should recheck her DEXA scan in 2020.

## 2019-09-15 NOTE — PROGRESS NOTES
Reba Noe is a 70 y.o. female  Chief Complaint   Patient presents with    Diabetes     follow up      Visit Vitals  /71 (BP 1 Location: Left arm, BP Patient Position: At rest)   Pulse 82   Temp 98 °F (36.7 °C) (Oral)   Resp 18   Ht 5' 3\" (1.6 m)   Wt 139 lb (63 kg)   SpO2 98%   BMI 24.62 kg/m²       Health Maintenance Due   Topic Date Due    EYE EXAM RETINAL OR DILATED  10/19/1959    Shingrix Vaccine Age 50> (1 of 2) 10/19/1999    GLAUCOMA SCREENING Q2Y  08/01/2018    HEMOGLOBIN A1C Q6M  07/18/2019    MICROALBUMIN Q1  07/19/2019    MEDICARE YEARLY EXAM  07/20/2019    FOOT EXAM Q1  08/07/2019    DTaP/Tdap/Td series (2 - Td) 09/13/2019       HPI  DM II - Seeing podiatry regularly. Lab Results   Component Value Date/Time    Hemoglobin A1c 6.1 (H) 01/18/2019 09:51 AM     Cholesterol controlled at last check:   Lab Results   Component Value Date/Time    Cholesterol, total 133 01/18/2019 09:51 AM    HDL Cholesterol 45 01/18/2019 09:51 AM    LDL, calculated 76 01/18/2019 09:51 AM    VLDL, calculated 12 01/18/2019 09:51 AM    Triglyceride 60 01/18/2019 09:51 AM     Lab Results   Component Value Date/Time    Microalb/Creat ratio (ug/mg creat.) <12.4 07/19/2018 02:09 PM     Currently taking:   Key Antihyperglycemic Medications     Patient is on no antihyperglycemic meds. Wt Readings from Last 3 Encounters:   09/16/19 139 lb (63 kg)   03/29/19 141 lb 6.4 oz (64.1 kg)   01/18/19 141 lb 6.4 oz (64.1 kg)     HTN Follow up - BP controlled:  BP Readings from Last 3 Encounters:   09/16/19 135/71   03/29/19 130/72   01/18/19 123/75     Currently taking:   Key CAD CHF Meds             triamterene-hydroCHLOROthiazide (MAXZIDE) 37.5-25 mg per tablet TAKE 1 TABLET EVERY DAY    atorvastatin (LIPITOR) 20 mg tablet TAKE 1 TABLET EVERY DAY        Having nerve pain (crawling sensations/pain) in feet and arms x years. Has seen podiatry. They suggested neuro referral if orthotics and labs aren't helpful/revealing. Review of Systems   Respiratory: Negative for shortness of breath. Cardiovascular: Negative for chest pain and palpitations. Neurological: Negative for dizziness, loss of consciousness and weakness. Physical Exam   Constitutional: She is oriented to person, place, and time. She appears well-developed and well-nourished. No distress. HENT:   Head: Normocephalic and atraumatic. Neck: Neck supple. No JVD present. No bruit bilateral carotid arteries. Cardiovascular: Normal rate, regular rhythm and normal heart sounds. Pulmonary/Chest: Effort normal and breath sounds normal. No respiratory distress. Musculoskeletal: She exhibits no edema. Neurological: She is alert and oriented to person, place, and time. Skin: Skin is warm and dry. Psychiatric: She has a normal mood and affect. Her behavior is normal. Judgment and thought content normal.   Nursing note and vitals reviewed. Diagnoses and all orders for this visit:    1. Type 2 diabetes mellitus with diabetic polyneuropathy, without long-term current use of insulin ([facility])  -     METABOLIC PANEL, COMPREHENSIVE  -     HEMOGLOBIN A1C W/O EAG  -     LIPID PANEL  -     MICROALBUMIN, UR, RAND W/ MICROALB/CREAT RATIO  -     CBC WITH AUTOMATED DIFF  -     VITAMIN B12  Pt declines neuro referral for now. Agree that this is reasonable. 2. Essential hypertension  Controlled    3. Pure hypercholesterolemia  Labs as above.

## 2019-09-16 ENCOUNTER — OFFICE VISIT (OUTPATIENT)
Dept: INTERNAL MEDICINE CLINIC | Age: 69
End: 2019-09-16

## 2019-09-16 VITALS
OXYGEN SATURATION: 98 % | HEART RATE: 82 BPM | BODY MASS INDEX: 24.63 KG/M2 | TEMPERATURE: 98 F | DIASTOLIC BLOOD PRESSURE: 71 MMHG | SYSTOLIC BLOOD PRESSURE: 135 MMHG | WEIGHT: 139 LBS | HEIGHT: 63 IN | RESPIRATION RATE: 18 BRPM

## 2019-09-16 DIAGNOSIS — E11.42 TYPE 2 DIABETES MELLITUS WITH DIABETIC POLYNEUROPATHY, WITHOUT LONG-TERM CURRENT USE OF INSULIN (HCC): Primary | ICD-10-CM

## 2019-09-16 DIAGNOSIS — I10 ESSENTIAL HYPERTENSION: ICD-10-CM

## 2019-09-16 DIAGNOSIS — E78.00 PURE HYPERCHOLESTEROLEMIA: ICD-10-CM

## 2019-09-16 RX ORDER — LATANOPROST 50 UG/ML
1 SOLUTION/ DROPS OPHTHALMIC
COMMUNITY
Start: 2019-07-31

## 2019-09-16 NOTE — PROGRESS NOTES
1. Have you been to the ER, urgent care clinic since your last visit? Hospitalized since your last visit? No    2. Have you seen or consulted any other health care providers outside of the [address] [place] [address] since your last visit? Include any pap smears or colon screening.  Yes    Chief Complaint   Patient presents with    Diabetes     follow up     fasting

## 2019-09-17 LAB
ALBUMIN SERPL-MCNC: 4.5 G/DL (ref 3.6–4.8)
ALBUMIN/CREAT UR: 6.3 MG/G CREAT (ref 0–30)
ALBUMIN/GLOB SERPL: 2 {RATIO} (ref 1.2–2.2)
ALP SERPL-CCNC: 40 IU/L (ref 39–117)
ALT SERPL-CCNC: 11 IU/L (ref 0–32)
AST SERPL-CCNC: 18 IU/L (ref 0–40)
BASOPHILS # BLD AUTO: 0 X10E3/UL (ref 0–0.2)
BASOPHILS NFR BLD AUTO: 1 %
BILIRUB SERPL-MCNC: 0.5 MG/DL (ref 0–1.2)
BUN SERPL-MCNC: 16 MG/DL (ref 8–27)
BUN/CREAT SERPL: 17 (ref 12–28)
CALCIUM SERPL-MCNC: 9.8 MG/DL (ref 8.7–10.3)
CHLORIDE SERPL-SCNC: 104 MMOL/L (ref 96–106)
CHOLEST SERPL-MCNC: 139 MG/DL (ref 100–199)
CO2 SERPL-SCNC: 25 MMOL/L (ref 20–29)
CREAT SERPL-MCNC: 0.92 MG/DL (ref 0.57–1)
CREAT UR-MCNC: 188 MG/DL
EOSINOPHIL # BLD AUTO: 0.1 X10E3/UL (ref 0–0.4)
EOSINOPHIL NFR BLD AUTO: 3 %
ERYTHROCYTE [DISTWIDTH] IN BLOOD BY AUTOMATED COUNT: 14.1 % (ref 12.3–15.4)
GLOBULIN SER CALC-MCNC: 2.2 G/DL (ref 1.5–4.5)
GLUCOSE SERPL-MCNC: 90 MG/DL (ref 65–99)
HBA1C MFR BLD: 6.1 % (ref 4.8–5.6)
HCT VFR BLD AUTO: 38.6 % (ref 34–46.6)
HDLC SERPL-MCNC: 56 MG/DL
HGB BLD-MCNC: 12.4 G/DL (ref 11.1–15.9)
IMM GRANULOCYTES # BLD AUTO: 0 X10E3/UL (ref 0–0.1)
IMM GRANULOCYTES NFR BLD AUTO: 0 %
INTERPRETATION, 910389: NORMAL
LDLC SERPL CALC-MCNC: 73 MG/DL (ref 0–99)
LYMPHOCYTES # BLD AUTO: 1.7 X10E3/UL (ref 0.7–3.1)
LYMPHOCYTES NFR BLD AUTO: 41 %
Lab: NORMAL
MCH RBC QN AUTO: 25.5 PG (ref 26.6–33)
MCHC RBC AUTO-ENTMCNC: 32.1 G/DL (ref 31.5–35.7)
MCV RBC AUTO: 79 FL (ref 79–97)
MICROALBUMIN UR-MCNC: 11.9 UG/ML
MONOCYTES # BLD AUTO: 0.3 X10E3/UL (ref 0.1–0.9)
MONOCYTES NFR BLD AUTO: 7 %
NEUTROPHILS # BLD AUTO: 1.9 X10E3/UL (ref 1.4–7)
NEUTROPHILS NFR BLD AUTO: 48 %
PLATELET # BLD AUTO: 284 X10E3/UL (ref 150–450)
POTASSIUM SERPL-SCNC: 4.5 MMOL/L (ref 3.5–5.2)
PROT SERPL-MCNC: 6.7 G/DL (ref 6–8.5)
RBC # BLD AUTO: 4.86 X10E6/UL (ref 3.77–5.28)
SODIUM SERPL-SCNC: 142 MMOL/L (ref 134–144)
TRIGL SERPL-MCNC: 48 MG/DL (ref 0–149)
VIT B12 SERPL-MCNC: 452 PG/ML (ref 232–1245)
VLDLC SERPL CALC-MCNC: 10 MG/DL (ref 5–40)
WBC # BLD AUTO: 4.1 X10E3/UL (ref 3.4–10.8)

## 2019-09-17 NOTE — PROGRESS NOTES
Excellent diabetes and cholesterol control. Blood Count, thyroid, liver enzymes, kidney function, B12, and electrolytes are all normal/acceptable. Good!

## 2019-10-07 DIAGNOSIS — G62.9 PERIPHERAL POLYNEUROPATHY: Primary | ICD-10-CM

## 2019-10-07 RX ORDER — TRIAMTERENE/HYDROCHLOROTHIAZID 37.5-25 MG
TABLET ORAL
Qty: 90 TAB | Refills: 1 | Status: SHIPPED | OUTPATIENT
Start: 2019-10-07 | End: 2019-10-21 | Stop reason: SDUPTHER

## 2019-10-07 RX ORDER — ATORVASTATIN CALCIUM 20 MG/1
TABLET, FILM COATED ORAL
Qty: 90 TAB | Refills: 1 | Status: SHIPPED | OUTPATIENT
Start: 2019-10-07 | End: 2020-01-24 | Stop reason: SDUPTHER

## 2019-10-07 NOTE — TELEPHONE ENCOUNTER
Patient saw Podiatrist have routine care of feet and was told she might need to see a neurologist about tingling in feet. Please call with a neurologist for her to see.

## 2019-10-07 NOTE — TELEPHONE ENCOUNTER
Neuro suggestions:    [facility]rology [facility]:     [address] [facility], [address] [facility]  [phone]     [address] [facility],  [address], [address]  [phone]    [address] [place] [address] [facility], [address] [place] [address]  [phone]     [facility], [address] [place] [address], [facility]  [phone]

## 2019-10-08 ENCOUNTER — OFFICE VISIT (OUTPATIENT)
Dept: NEUROLOGY | Age: 69
End: 2019-10-08

## 2019-10-08 VITALS
DIASTOLIC BLOOD PRESSURE: 75 MMHG | SYSTOLIC BLOOD PRESSURE: 125 MMHG | WEIGHT: 142 LBS | HEIGHT: 63 IN | HEART RATE: 62 BPM | RESPIRATION RATE: 18 BRPM | OXYGEN SATURATION: 98 % | BODY MASS INDEX: 25.16 KG/M2

## 2019-10-08 DIAGNOSIS — R20.0 NUMBNESS OF TOES: Primary | ICD-10-CM

## 2019-10-08 RX ORDER — GABAPENTIN 100 MG/1
100 CAPSULE ORAL 2 TIMES DAILY
Qty: 60 CAP | Refills: 1 | Status: SHIPPED | OUTPATIENT
Start: 2019-10-08 | End: 2020-06-30

## 2019-10-08 NOTE — PATIENT INSTRUCTIONS
[facility]   Statement to Patients  April 1, 2014      In an effort to ensure the large volume of patient prescription refills is processed in the most efficient and expeditious manner, we are asking our patients to assist us by calling your Pharmacy for all prescription refills, this will include also your  Mail Order Pharmacy. The pharmacy will contact our office electronically to continue the refill process. Please do not wait until the last minute to call your pharmacy. We need at least 48 hours (2days) to fill prescriptions. We also encourage you to call your pharmacy before going to  your prescription to make sure it is ready. With regard to controlled substance prescription refill requests (narcotic refills) that need to be picked up at our office, we ask your cooperation by providing us with at least 72 hours (3days) notice that you will need a refill. We will not refill narcotic prescription refill requests after 4:00pm on any weekday, Monday through Thursday, or after 2:00pm on Fridays, or on the weekends. We encourage everyone to explore another way of getting your prescription refill request processed using [employer], our patient web portal through our electronic medical record system. [employer] is an efficient and effective way to communicate your medication request directly to the office and  downloadable as an manav on your smart phone . [employer] also features a review functionality that allows you to view your medication list as well as leave messages for your physician. Are you ready to get connected? If so please review the attatched instructions or speak to any of our staff to get you set up right away! Thank you so much for your cooperation. Should you have any questions please contact our Practice Administrator.     The Physicians and Staff,  [facility]

## 2019-10-08 NOTE — PROGRESS NOTES
Name:  Hood Kurtz      :  1950    PCP:   Trina Drake PA-C      Referring:  As above  MRN:   [id number]    Chief Complaint:   Chief Complaint   Patient presents with    Tingling     bilateral foot/toes x 8mo    Foot Pain     bilateral       HISTORY OF PRESENT ILLNESS:     This is a 70 y.o. female with PMHx DM/ pre-DM, HLD, HTN, Osteoporosis, Atrophic vaginitis, Allergic rhinitis, who presents for evaluation of tingling in toes. She describes that maybe for the past 1 year she has had frequent tingling in all toes (bottoms more than tops), particularly when she wears shoes or walks a distance. Tingling seems to be less when she takes off shoes and is able to move toes around. She has seen her Podiatrist about this but he didn't think it was from a podiatric cause. He recommended she see PCP about it and if symptoms persisted, ask PCP for referral to Neurologist.  Pt denies any numbness in remainder of feet or up leg. When asked about any pain radiating from her back down her legs, she says no, but she does have lower back pains. Has a dx of DM listed in chart but pt says this her blood sugars were controlled with exercise and to some extent diet, so she's not clear that she has DM. Last A1c in 2019 was 6.1, pre-DM level. Complete Review of Systems: + joint pain; otherwise as noted in HPI     No Known Allergies  Past Medical History:   Diagnosis Date    Allergic rhinitis     Atrophic vaginitis     DM [facility]diabetes mellitus) ([facility] [address]     HLD (hyperlipidemia)     Hypertension     Neuropathy     Osteoporosis     meds started      Current Outpatient Medications   Medication Sig Dispense Refill    gabapentin (NEURONTIN) 100 mg capsule Take 1 Cap by mouth two (2) times a day. Max Daily Amount: 200 mg.  For tingling, numbness in toes 60 Cap 1    atorvastatin (LIPITOR) 20 mg tablet TAKE 1 TABLET EVERY DAY 90 Tab 1    triamterene-hydroCHLOROthiazide (MAXZIDE) 37.5-25 mg per tablet TAKE 1 TABLET EVERY DAY 90 Tab 1    latanoprost (XALATAN) 0.005 % ophthalmic solution       estradiol (IMVEXXY STARTER PACK VA) Insert  into vagina.  dicyclomine (BENTYL) 10 mg capsule       omeprazole (PRILOSEC) 20 mg capsule Take 1 Cap by mouth daily. 90 Cap 1    glucose blood VI test strips (BLOOD GLUCOSE TEST) strip daily 1 Each 11     Past Surgical History:   Procedure Laterality Date    HX COLONOSCOPY  2011     Family History   Problem Relation Age of Onset    Breast Cancer Mother 48        breast    Cancer Mother     Diabetes Brother     Diabetes Brother     Hypertension Brother     Dementia Maternal Grandmother      SocHx:   reports that she has never smoked. She has never used smokeless tobacco. She reports that she drank alcohol. PHYSICAL EXAM  Vitals:    10/08/19 1408   BP: 125/75   Pulse: 62   Resp: 18   SpO2: 98%   Weight: 64.4 kg (142 lb)   Height: 5' 3\" (1.6 m)       General:  Alert, cooperative, NAD   Head:  Normocephalic, atraumatic. Eyes:  Conjunctivae/corneas clear   Lungs:  Heart:  Non labored breathing  Regular rate, rhythm   Extremities: No edema.    Skin: No rashes    Neurologic Exam       Language: normal  Memory:  Alert, oriented to person, place, situation    Cranial Nerves:  I: smell Not tested   II: visual fields Full to confrontation   II: pupils Equal, round   II: optic disc Not examined, not relevant   III,VII: ptosis none   III,IV,VI: extraocular muscles  normal   V: facial light touch sensation  normal   VII: facial muscle function  symmetric   VIII: hearing symmetric   IX: soft palate elevation  Not examined, not relevant   XI: sternocleidomastoid strength 5/5   XII: tongue  Not examined, not relevant      Motor: normal bulk, tone, strength in all exts  Sensory: intact to LT, PP, temp, vibration x 4 exts   Cerebellar: no rest, postural, or intention tremor  Normal FNF bilaterally  Reflexes: 1+ biceps, 2+ patellars, 1+ achilles (symmetric)   Plantar response: neutral bilaterally    Gait: normal gait including tandem  Romberg negative     ASSESSMENT AND PLAN    ICD-10-CM ICD-9-CM    1. Numbness of toes R20.0 782. 0 EMG LIMITED      gabapentin (NEURONTIN) 100 mg capsule     Will check EMG/ NCS both legs to eval for peripheral neuropathy. Pt asked about medication to help tingling in feet. Discussed using Gabapentin 100 mg up to twice a day to help feet pain. Common SEFx discussed as well. Follow up after EMG complete. Thank you for allowing me to be a part of your patient's care. Please call me at [phone] if you have any questions.      Sincerely,  Javan Obando MD

## 2019-10-28 ENCOUNTER — OFFICE VISIT (OUTPATIENT)
Dept: NEUROLOGY | Age: 69
End: 2019-10-28

## 2019-10-28 DIAGNOSIS — R20.0 NUMBNESS OF TOES: Primary | ICD-10-CM

## 2019-10-28 NOTE — PROCEDURES
EMG/ NCS Report  DRUG REHABILITATION  - [facility]  [facility]  [facility], [address] [place] [address] [address], [address]   Ph: [phone]   FAX: [phone]/ [phone]  Test Date:  10/28/2019    Patient: Reggie Ardon : 1950 Physician: Praveen Mason M.D. Sex: Female Height: ' \" Ref Phys: Praveen Mason M.D.   ID#: [id number] Weight:  lbs. Technician: Scooter Crews     Patient History:  CC: NUMBNESS, TINGLING IN TOES. EMG & NCV Findings:  Evaluation of the left Fibular motor and the right Fibular motor nerves showed normal distal onset latency (L3.7, R3.8 ms), normal amplitude (L5.8, R6.7 mV), normal conduction velocity (B Fib-Ankle, L49, R51 m/s), and normal conduction velocity (Poplt-B Fib, L56, R71 m/s). The left tibial motor and the right tibial motor nerves showed normal distal onset latency (L3.9, R3.2 ms), normal amplitude (L4.0, R6.6 mV), and normal conduction velocity (Knee-Ankle, L50, R41 m/s). The left Sup Fibular sensory and the right Sup Fibular sensory nerves showed normal distal peak latency (L2.3, R2.6 ms), normal amplitude (L8.7, R14.3 µV), and normal conduction velocity (Lower leg-Lat ankle, L53, R53 m/s). The left sural sensory and the right sural sensory nerves showed normal distal peak latency (L4.1, R3.6 ms) and normal amplitude (L6.7, R9.5 µV). All F Wave latencies were within normal limits. All F Wave left vs. right side latency differences were within normal limits. All H Reflex left vs. right side latency differences were within normal limits. All examined muscles (as indicated in the following table) showed no evidence of electrical instability. Impression:    Normal bilateral lower extremity EMG/ Nerve Conduction Studies    No evidence of large fiber peripheral neuropathy or lumbar radiculopathy in either lower extremity. A normal nerve conduction study does not exclude the possibility of small fiber neuropathy.  Clinical correlation necessary.      ___________________________  Carissa Grant M.D.      Nerve Conduction Studies  Anti Sensory Summary Table     Stim Site NR Peak (ms) Norm Peak (ms) P-T Amp (µV) Norm P-T Amp Site1 Site2 Dist (cm)   Left Sup Fibular Anti Sensory (Lat ankle)  31.8°C   Lower leg    2.3 <4.6 8.7 >4 Lower leg Lat ankle 10.0   Site 2    2.7  4.5           2.8  18.6       Right Sup Fibular Anti Sensory (Lat ankle)  31.2°C   Lower leg    2.6 <4.6 14.3 >4 Lower leg Lat ankle 10.0   Site 2    2.6  11.6       Left Sural Anti Sensory (Lat Mall)  31.6°C   Calf    4.1 <4.5 6.7 >4.0 Calf Lat Mall 14.0   Site 2    3.7  9.8       Site 3    3.7  4.0       Right Sural Anti Sensory (Lat Mall)  31.1°C   Calf    3.6 <4.5 9.5 >4.0 Calf Lat Mall 14.0   Site 2    3.7  7.1       Site 3    3.7  7.8         Motor Summary Table     Stim Site NR Onset (ms) Norm Onset (ms) O-P Amp (mV) Norm O-P Amp Amp (Prev) (%) Site1 Site2 Dist (cm) Josue (m/s) Norm Josue (m/s)   Left Fibular Motor (Ext Dig Brev)  31.7°C   Ankle    3.7 <6.5 5.8 >1.1 100.0 Ankle Ext Dig Brev 8.0     B Fib    10.5  5.5  94.8 B Fib Ankle 33.0 49 >38   Poplt    12.3  4.6  83.6 Poplt B Fib 10.0 56 >42   Right Fibular Motor (Ext Dig Brev)  31.2°C   Ankle    3.8 <6.5 6.7 >1.1 100.0 Ankle Ext Dig Brev 8.0     B Fib    10.3  6.3  94.0 B Fib Ankle 33.0 51 >38   Poplt    11.7  5.9  93.7 Poplt B Fib 10.0 71 >42   Left Tibial Motor (Abd Samuels Brev)  31.5°C   Ankle    3.9 <6.1 4.0 >1.1 100.0 Ankle Abd Samuels Brev 8.0     Knee    11.3  2.0  50.0 Knee Ankle 37.0 50 >39   Right Tibial Motor (Abd Samuels Brev)  31.1°C   Ankle    3.2 <6.1 6.6 >1.1 100.0 Ankle Abd Samuels Brev 8.0     Knee    12.3  5.2  78.8 Knee Ankle 37.0 41 >39     F Wave Studies     NR F-Lat (ms) Lat Norm (ms) L-R F-Lat (ms) L-R Lat Norm   Left Tibial (Mrkrs) (Abd Hallucis)  31.5°C      47.27 <56 0.00 <5.7   Right Tibial (Mrkrs) (Abd Hallucis)  31.1°C      47.27 <56 0.00 <5.7     H Reflex Studies     NR H-Lat (ms) L-R H-Lat (ms) L-R Lat Norm   Left Tibial (Gastroc)  31.5°C      30.27 0.00 <2.0   Right Tibial (Gastroc)  31°C      30.27 0.00 <2.0     EMG     Side Muscle Nerve Root Ins Act Fibs Psw Recrt Duration Amp Poly Comment   Left Ext Dig Brev Dp Br Peron L5, S1 Nml Nml Nml Nml Nml Nml Nml    Left MedGastroc Tibial S1-2 Nml Nml Nml Nml Nml Nml Nml    Left AntTibialis Dp Br Peron L4-5 Nml Nml Nml Nml Nml Nml Nml    Left VastusMed Femoral L2-4 Nml Nml Nml Nml Nml Nml Nml    Left GluteusMed SupGluteal L4-S1 Nml Nml Nml Nml Nml Nml Nml    Left Lower Lumb Parasp Rami L5,S1 Nml Nml Nml Nml Nml Nml Nml    Right Ext Dig Brev Dp Br Peron L5, S1 Nml Nml Nml Nml Nml Nml Nml    Right MedGastroc Tibial S1-2 Nml Nml Nml Nml Nml Nml Nml    Right AntTibialis Dp Br Peron L4-5 Nml Nml Nml Nml Nml Nml Nml    Right VastusMed Femoral L2-4 Nml Nml Nml Nml Nml Nml Nml    Right GluteusMed SupGluteal L4-S1 Nml Nml Nml Nml Nml Nml Nml    Right Lower Lumb Parasp Rami L5,S1 Nml Nml Nml Nml Nml Nml Nml                Waveforms:

## 2019-11-19 ENCOUNTER — OFFICE VISIT (OUTPATIENT)
Dept: NEUROLOGY | Age: 69
End: 2019-11-19

## 2019-11-19 VITALS
HEIGHT: 63 IN | SYSTOLIC BLOOD PRESSURE: 134 MMHG | OXYGEN SATURATION: 98 % | BODY MASS INDEX: 25.15 KG/M2 | HEART RATE: 84 BPM | DIASTOLIC BLOOD PRESSURE: 80 MMHG | RESPIRATION RATE: 20 BRPM

## 2019-11-19 DIAGNOSIS — R20.0 NUMBNESS OF TOES: Primary | ICD-10-CM

## 2019-11-19 NOTE — PROGRESS NOTES
Neurology Progress Note    Patient ID:   Mor Jenkins  [id number]  10 y.o.  1950    Date of Office Visit: 11/19/19    Chief Complaint   Patient presents with    Results     Interval Hx:       EMG:  Normal bilateral lower extremity EMG/ NCS study. No evidence of large fiber peripheral neuropathy or lumbar radiculopathy in either lower extremity. A normal nerve conduction study does not exclude the possibility of small fiber neuropathy. Clinical correlation necessary. Still having burning, tingling in toes on both feet. Continues to deny any pains shooting down either leg. Patient reports that she is working with her Podiatrist/ Dr Devora Nuñez and they are discussing getting her some custom shoe inserts that may reduce the pain in her toes. She filled the Rx for Gabapentin 100 mg BID but has not tried it yet due to concern of possible side effects. Brief ROS: as noted above or otherwise negative    Objective:     No Known Allergies    PMHx:  Past Medical History:   Diagnosis Date    Allergic rhinitis     Atrophic vaginitis     [facility] ([facility] [address]     HLD (hyperlipidemia)     Hypertension     Neuropathy     Osteoporosis     meds started 2007     PSHx:  has a past surgical history that includes hx colonoscopy (2011). Current Outpatient Medications on File Prior to Visit   Medication Sig    triamterene-hydroCHLOROthiazide (MAXZIDE) 37.5-25 mg per tablet TAKE 1 TABLET EVERY DAY    gabapentin (NEURONTIN) 100 mg capsule Take 1 Cap by mouth two (2) times a day. Max Daily Amount: 200 mg. For tingling, numbness in toes    atorvastatin (LIPITOR) 20 mg tablet TAKE 1 TABLET EVERY DAY    latanoprost (XALATAN) 0.005 % ophthalmic solution     estradiol (IMVEXXY STARTER PACK VA) Insert  into vagina.  dicyclomine (BENTYL) 10 mg capsule     omeprazole (PRILOSEC) 20 mg capsule Take 1 Cap by mouth daily.     glucose blood VI test strips (BLOOD GLUCOSE TEST) strip daily     No current facility-administered medications on file prior to visit. Physical Exam  Vitals:    11/19/19 1551   BP: 134/80   Pulse: 84   Resp: 20   SpO2: 98%   Height: 5' 3\" (1.6 m)       General:     No formal exam was performed today. The entirety of the visit was spent reviewing EMG findings, discussing possible small fiber neuropathy and how that would be diagnosed (skin biopsy). In general, patient is awake, alert, conversant  No apparent CN abnormalities  Moving all extremities freely  Ambulated normally when leaving exam room      Assessment:       ICD-10-CM ICD-9-CM    1. Numbness of toes R20.0 782.0        Tingling, burning in all toes on both feet. EMG both leg was normal. D/w patient the option of doing skin biopsy to look for any evidence of small fiber neuropathy vs her working with Podiatrist to see if they can find something that reduces the pain in toes vs her empirically trying the low-dose gabapentin to see if it suppresses her symptoms. She wanted to keep working with Dr Chani Haskins and if still having the same symptoms, she will consider either starting the Gabapentin or/ and calling back to schedule the skin biopsy.       Follow up if needed      Signed By: Cathleen Allred MD     November 19, 2019

## 2019-12-11 ENCOUNTER — OFFICE VISIT (OUTPATIENT)
Dept: INTERNAL MEDICINE CLINIC | Age: 69
End: 2019-12-11

## 2019-12-11 VITALS
BODY MASS INDEX: 24.88 KG/M2 | SYSTOLIC BLOOD PRESSURE: 156 MMHG | OXYGEN SATURATION: 99 % | RESPIRATION RATE: 18 BRPM | TEMPERATURE: 97.6 F | WEIGHT: 140.4 LBS | HEART RATE: 80 BPM | HEIGHT: 63 IN | DIASTOLIC BLOOD PRESSURE: 76 MMHG

## 2019-12-11 DIAGNOSIS — I10 ESSENTIAL HYPERTENSION: ICD-10-CM

## 2019-12-11 DIAGNOSIS — J30.89 NON-SEASONAL ALLERGIC RHINITIS, UNSPECIFIED TRIGGER: Primary | ICD-10-CM

## 2019-12-11 RX ORDER — CETIRIZINE HCL 10 MG
10 TABLET ORAL
Qty: 30 TAB | Refills: 5
Start: 2019-12-11 | End: 2020-06-30

## 2019-12-11 NOTE — PROGRESS NOTES
1. Have you been to the ER, urgent care clinic since your last visit? Hospitalized since your last visit?no      2. Have you seen or consulted any other health care providers outside of the [address] [place] [address] since your last visit? Include any pap smears or colon screening.  No    Chief Complaint   Patient presents with    Headache     and pressure on face on and off for months     Not fasting

## 2019-12-11 NOTE — PROGRESS NOTES
Stacie Medina is a 78 y.o. female  Chief Complaint   Patient presents with    Headache     and pressure on face on and off for months     Visit Vitals  /72 (BP 1 Location: Left arm, BP Patient Position: At rest)   Pulse 80   Temp 97.6 °F (36.4 °C) (Oral)   Resp 18   Ht 5' 3\" (1.6 m)   Wt 140 lb 6.4 oz (63.7 kg)   SpO2 99%   BMI 24.87 kg/m²      Health Maintenance Due   Topic Date Due    EYE EXAM RETINAL OR DILATED  10/19/1959    Shingrix Vaccine Age 50> (1 of 2) 10/19/1999    GLAUCOMA SCREENING Q2Y  08/01/2018    MEDICARE YEARLY EXAM  07/20/2019    FOOT EXAM Q1  08/07/2019    DTaP/Tdap/Td series (2 - Td) 09/13/2019       HPI  Headaches regularly for several months. Sinus pressure R>L  Slight sore throat 2 days ago. Better with tea and lemon. Took Alleve - helps somewhat, but headache returns. BP Readings from Last 3 Encounters:   12/11/19 147/72   11/19/19 134/80   10/08/19 125/75     HTN Follow up - BP uncontrolled today and not improved at recheck:  BP Readings from Last 3 Encounters:   12/11/19 147/72   11/19/19 134/80   10/08/19 125/75   has had more salt lately than usual.     Currently taking:   Key CAD CHF Meds             triamterene-hydroCHLOROthiazide (MAXZIDE) 37.5-25 mg per tablet (Taking) TAKE 1 TABLET EVERY DAY    atorvastatin (LIPITOR) 20 mg tablet (Taking) TAKE 1 TABLET EVERY DAY        Review of Systems   Constitutional: Positive for malaise/fatigue. Negative for fever. HENT: Positive for congestion and sinus pain. Negative for ear pain. Respiratory: Negative for cough, sputum production and shortness of breath. Cardiovascular: Negative for chest pain and palpitations. Neurological: Positive for headaches. Endo/Heme/Allergies: Negative for environmental allergies. Physical Exam  Vitals signs and nursing note reviewed. Constitutional:       General: She is not in acute distress. Appearance: She is well-developed.    HENT:      Head: Normocephalic and atraumatic. Ears:      Comments: B TMs with fluid. No erythema. Nose: Congestion and rhinorrhea present. Comments: Pale, inflamed nasal mucosa  Eyes:      Conjunctiva/sclera: Conjunctivae normal.   Neck:      Musculoskeletal: Neck supple. Cardiovascular:      Rate and Rhythm: Normal rate and regular rhythm. Heart sounds: Normal heart sounds. Pulmonary:      Effort: Pulmonary effort is normal.      Breath sounds: Normal breath sounds. Lymphadenopathy:      Cervical: No cervical adenopathy. Skin:     General: Skin is warm and dry. Neurological:      Mental Status: She is alert and oriented to person, place, and time. Diagnoses and all orders for this visit:    1. Non-seasonal allergic rhinitis, unspecified trigger  -     cetirizine (ZYRTEC) 10 mg tablet; Take 1 Tab by mouth nightly. INI 1 week, call for AB. 2. Essential hypertension  Uncontrolled today. Low sodium diet and follow up in 2-6 weeks.

## 2019-12-31 ENCOUNTER — HOSPITAL ENCOUNTER (OUTPATIENT)
Dept: MAMMOGRAPHY | Age: 69
Discharge: HOME | End: 2019-12-31
Attending: PHYSICIAN ASSISTANT
Payer: MEDICARE

## 2019-12-31 DIAGNOSIS — Z12.31 VISIT FOR SCREENING MAMMOGRAM: ICD-10-CM

## 2019-12-31 PROCEDURE — 77067 SCR MAMMO BI INCL CAD: CPT

## 2020-01-24 RX ORDER — ATORVASTATIN CALCIUM 20 MG/1
TABLET, FILM COATED ORAL
Qty: 90 TAB | Refills: 1 | Status: SHIPPED | OUTPATIENT
Start: 2020-01-24 | End: 2020-06-06

## 2020-01-30 RX ORDER — TRIAMTERENE/HYDROCHLOROTHIAZID 37.5-25 MG
TABLET ORAL
Qty: 30 TAB | Refills: 1 | Status: SHIPPED | OUTPATIENT
Start: 2020-01-30 | End: 2020-06-10

## 2020-02-10 ENCOUNTER — OFFICE VISIT (OUTPATIENT)
Dept: INTERNAL MEDICINE CLINIC | Age: 70
End: 2020-02-10

## 2020-02-10 VITALS
OXYGEN SATURATION: 99 % | TEMPERATURE: 98 F | WEIGHT: 138 LBS | HEIGHT: 63 IN | BODY MASS INDEX: 24.45 KG/M2 | SYSTOLIC BLOOD PRESSURE: 137 MMHG | DIASTOLIC BLOOD PRESSURE: 63 MMHG | HEART RATE: 70 BPM | RESPIRATION RATE: 15 BRPM

## 2020-02-10 DIAGNOSIS — R07.9 CHEST PAIN, UNSPECIFIED TYPE: ICD-10-CM

## 2020-02-10 DIAGNOSIS — K59.00 CONSTIPATION, UNSPECIFIED CONSTIPATION TYPE: ICD-10-CM

## 2020-02-10 DIAGNOSIS — Z00.00 ENCOUNTER FOR MEDICARE ANNUAL WELLNESS EXAM: Primary | ICD-10-CM

## 2020-02-10 NOTE — PROGRESS NOTES
Jasen Alcaraz is a 78 y.o. female  Chief Complaint   Patient presents with    Chest Pain     last 3 days     Constipation     last 3 days      Visit Vitals  /63 (BP 1 Location: Left arm, BP Patient Position: At rest)   Pulse 70   Temp 98 °F (36.7 °C) (Oral)   Resp 15   Ht 5' 3\" (1.6 m)   Wt 138 lb (62.6 kg)   SpO2 99%   BMI 24.45 kg/m²       Health Maintenance Due   Topic Date Due    Eye Exam Retinal or Dilated  10[birth year]19/1960    Shingrix Vaccine Age 50> (1 of 2) 10/19/1999    GLAUCOMA SCREENING Q2Y  08/01/2018    Medicare Yearly Exam  07/20/2019    Foot Exam Q1  08/07/2019    DTaP/Tdap/Td series (2 - Td) 09/13/2019       HPI  Constipated starting Thursday Tried OTC constipation medication (unknown type) but felt that BM was not complete. Had stopped eating apples/wheat bread recently. Eating more fast food. Planning to restart healthy diet measures. Right chest pain and R shoulder/neck pain started Friday - took Alleve and that helped. Chest pain possibly slightly worse with walking up stairs. No associated SOB. UTD with colonoscopy. This is the Subsequent Medicare Annual Wellness Exam, performed 12 months or more after the Initial AWV or the last Subsequent AWV    I have reviewed the patient's medical history in detail and updated the computerized patient record. History     Past Medical History:   Diagnosis Date    Allergic rhinitis     Atrophic vaginitis     DM (diabetes mellitus) ([facility] [address]     HLD (hyperlipidemia)     Hypertension     Neuropathy     Osteoporosis     meds started 2007      Past Surgical History:   Procedure Laterality Date    HX COLONOSCOPY  2011     Current Outpatient Medications   Medication Sig Dispense Refill    triamterene-hydroCHLOROthiazide (MAXZIDE) 37.5-25 mg per tablet TAKE 1 TABLET EVERY DAY 30 Tab 1    atorvastatin (LIPITOR) 20 mg tablet TAKE 1 TABLET EVERY DAY 90 Tab 1    cetirizine (ZYRTEC) 10 mg tablet Take 1 Tab by mouth nightly.  30 Tab 5    gabapentin (NEURONTIN) 100 mg capsule Take 1 Cap by mouth two (2) times a day. Max Daily Amount: 200 mg. For tingling, numbness in toes 60 Cap 1    latanoprost (XALATAN) 0.005 % ophthalmic solution       estradiol (IMVEXXY STARTER PACK VA) Insert  into vagina.  dicyclomine (BENTYL) 10 mg capsule       omeprazole (PRILOSEC) 20 mg capsule Take 1 Cap by mouth daily. 90 Cap 1    glucose blood VI test strips (BLOOD GLUCOSE TEST) strip daily 1 Each 11     No Known Allergies  Family History   Problem Relation Age of Onset    Breast Cancer Mother 48        breast    Cancer Mother     Diabetes Brother     Diabetes Brother     Hypertension Brother     Dementia Maternal Grandmother      Social History     Tobacco Use    Smoking status: Never Smoker    Smokeless tobacco: Never Used   Substance Use Topics    Alcohol use: Not Currently     Patient Active Problem List   Diagnosis Code    Common migraine G43.009    GERD (gastroesophageal reflux disease) K21.9    Osteopenia M85.80    Atrophic vaginitis N95.2    Allergic rhinitis J30.9    Type 2 diabetes mellitus with diabetic polyneuropathy ([facility] [address] E11.42    Essential hypertension I10    Pure hypercholesterolemia E78.00    Living will, counseling/discussion Z71.89       Depression Risk Factor Screening:     3 most recent PHQ Screens 2/10/2020   Little interest or pleasure in doing things Not at all   Feeling down, depressed, irritable, or hopeless Not at all   Total Score PHQ 2 0     Alcohol Risk Factor Screening:    reports previous alcohol use. 1x.week at most 2 drinks    Functional Ability and Level of Safety:   Hearing Loss  Hearing is good. Activities of Daily Living  The home contains: handrails  Patient does total self care     Fall Risk  Fall Risk Assessment, last 12 mths 2/10/2020   Able to walk? Yes   Fall in past 12 months?  No   Fall with injury? -   Number of falls in past 12 months -   Fall Risk Score -       Abuse Screen  Patient is not abused    Cognitive Screening   Evaluation of Cognitive Function:  Has your family/caregiver stated any concerns about your memory: no     Patient Care Team   Patient Care Team:  Yue Baldwin as PCP - General (Physician Assistant)  Christy Wright PA-C as PCP - [facility]  Andre Sandoval MD (Orthopedic Surgery)  Kobi Guerrero MD (Orthopedic Surgery)  Dr. Cam Sneed (Ophthalmology)  Jaelyn Moore MD (Gynecology)    Assessment/Plan   Education and counseling provided:  Are appropriate based on today's review and evaluation    Extended Emergency Contact Information  Primary Emergency Contact: [address], [address] Phone: [phone]  Relation: Spouse  Has acp at home - will bring it in. Review of Systems   Constitutional: Negative for fever and malaise/fatigue. Respiratory: Negative for cough and shortness of breath. Cardiovascular: Positive for chest pain. Negative for palpitations. Gastrointestinal: Positive for constipation. Negative for abdominal pain, diarrhea, nausea and vomiting. Musculoskeletal: Positive for neck pain. Neurological: Negative for dizziness, loss of consciousness and weakness. Physical Exam  Vitals signs and nursing note reviewed. Constitutional:       General: She is not in acute distress. Appearance: She is well-developed. She is not diaphoretic. HENT:      Head: Normocephalic and atraumatic. Neck:      Musculoskeletal: Neck supple. Vascular: No JVD. Cardiovascular:      Rate and Rhythm: Normal rate and regular rhythm. Heart sounds: Normal heart sounds. Pulmonary:      Effort: Pulmonary effort is normal. No respiratory distress. Breath sounds: Normal breath sounds. Abdominal:      General: Bowel sounds are normal. There is no distension. Palpations: Abdomen is soft. There is no mass. Tenderness: There is no abdominal tenderness.  There is no guarding or rebound. Hernia: No hernia is present. Skin:     General: Skin is warm and dry. Neurological:      Mental Status: She is alert and oriented to person, place, and time. Psychiatric:         Behavior: Behavior normal.         Thought Content: Thought content normal.         Judgment: Judgment normal.         Diagnoses and all orders for this visit:    1. Encounter for Medicare annual wellness exam    2. Chest pain, unspecified type  -     AMB POC EKG ROUTINE W/ 12 LEADS, INTER & REP WNL  Likely related to constipation or possible mild orthopedic injury. Tylenol PRN, rest, measures to relieve constipation. Go to ER if sx change or worsen. 3. Constipation, unspecified constipation type  Colace BID x 10 days or until sx better. INI after 10 days, will write referral to GI.

## 2020-02-10 NOTE — PROGRESS NOTES
1. Have you been to the ER, urgent care clinic since your last visit? Hospitalized since your last visit? No    2. Have you seen or consulted any other health care providers outside of the [address] [place] [address] since your last visit? Include any pap smears or colon screening.  No   Chief Complaint   Patient presents with    Chest Pain     last 3 days     Constipation     last 3 days     fasting

## 2020-02-17 ENCOUNTER — TELEPHONE (OUTPATIENT)
Dept: INTERNAL MEDICINE CLINIC | Age: 70
End: 2020-02-17

## 2020-02-17 DIAGNOSIS — R07.9 CHEST PAIN, UNSPECIFIED TYPE: Primary | ICD-10-CM

## 2020-02-17 NOTE — TELEPHONE ENCOUNTER
Patient called with concern,patient states she's still having chest pain [facility] then visit to ER on Saturday patient states she's still having chest pain. Patient stated they wanted to keep her at the hospital to run more test, patient states she refused.

## 2020-02-17 NOTE — TELEPHONE ENCOUNTER
Reviewed ER notes. Pt needs to establish with a  Cardiologist. Referral written. Please ask pt to schedule appointment with cardiology.

## 2020-02-17 NOTE — TELEPHONE ENCOUNTER
Spoke with patient advised per Arabella Gant PA-C for patient to go to the ER for chest pain. Patient states that the pain is not as bad as it was when she went to the ER this weekend. Patient also states that she was advised from the [facility] to follow up with PCP for additional testing since they ruled out heart attack.

## 2020-02-18 ENCOUNTER — OFFICE VISIT (OUTPATIENT)
Dept: CARDIOLOGY CLINIC | Age: 70
End: 2020-02-18

## 2020-02-18 VITALS
WEIGHT: 137 LBS | HEART RATE: 76 BPM | DIASTOLIC BLOOD PRESSURE: 86 MMHG | RESPIRATION RATE: 15 BRPM | BODY MASS INDEX: 24.27 KG/M2 | OXYGEN SATURATION: 100 % | HEIGHT: 63 IN | SYSTOLIC BLOOD PRESSURE: 144 MMHG

## 2020-02-18 DIAGNOSIS — K21.00 GASTROESOPHAGEAL REFLUX DISEASE WITH ESOPHAGITIS: ICD-10-CM

## 2020-02-18 DIAGNOSIS — E78.00 PURE HYPERCHOLESTEROLEMIA: ICD-10-CM

## 2020-02-18 DIAGNOSIS — R07.9 CHEST PAIN, UNSPECIFIED TYPE: Primary | ICD-10-CM

## 2020-02-18 DIAGNOSIS — I10 ESSENTIAL HYPERTENSION: ICD-10-CM

## 2020-02-18 NOTE — LETTER
2/18/20 Patient: Thomas Wick YOB: 1950 Date of Visit: 2/18/2020 Selma Alvarez, [address] [facility] [address] VIA In Basket Dear Selma Alvarez PA-C, Thank you for referring Ms. Kali Monroy to [facility] for evaluation. My notes for this consultation are attached. If you have questions, please do not hesitate to call me. I look forward to following your patient along with you.  
 
 
Sincerely, 
 
Ligia Gonzalez MD

## 2020-02-18 NOTE — PROGRESS NOTES
[facility] [address]: Socrates Black  [phone]          Cardiology Consult/Progress Note      Requesting/referring provider: MANI Shaffer  Reason for Consult:Chest pain    HPI: Edel Moise, a 78y.o. year-old who presents for evaluation of chest pain. She is reported chest pain for the past 2 weeks. It is an intermittent pain with some worsening on activity/exercise. Pain is reported as tightness in the lower precordial region. There is no radiation or reference of the pain. And no specific relieving factors. She was recently seen at the ER in [facility] where 2 sets of troponins were performed and were negative. Serial ECGs were performed which were unremarkable for any ischemic changes as well. Patient did not want to get hospitalized and hence was discharged with plan to perform subsequent work-up as an outpatient. She has history of hypertension, diabetes mellitus and neuropathy. She reports no similar episodes of chest pain in the past.  She has some degree of shortness of breath but it has not changed in recent few months. She denies any symptoms consistent with congestive heart failure such as pedal edema orthopnea or PND. She reports similar symptoms noted about 4 to 5 months ago when she saw a gastroenterologist and after taking reflux medications her symptoms were relieved. She has been previously fairly healthy performing moderate exercise at the gym including aerobics and weightlifting and involved in multiple community activities without any cardiopulmonary limiting factors. Over the last 2 months she has not performed as much exertion as usually she did in the past.    Investigations  ECG: February 2020: Sinus rhythm, otherwise unremarkable ECG  CBC, BMP within normal limit from [facility] ER February 2020  Troponins x2- negative    Assessment/Plan:  1. Chest pain of uncertain etiology  2. Hypertension  3. Hyperlipidemia  4. Prediabetes  5. Possible gastroesophageal reflux disease. Mrs. Kaci Garay was seen at the request of MANI Jose. I am uncertain of the etiology of her chest pain. She has risk factor factors for developing obstructive coronary disease. Hence I recommended proceeding with echocardiogram stress test particularly in the light of having shortness of breath in addition to her chest discomfort. Currently she is chest pain-free. Her recent evaluation at [facility] demonstrated negative troponins and ruled out for myocardial infarction which is reassuring. Based on the stress test results I informed her she may require either further cardiac catheterization or could be managed medically. Her blood pressure is marginally elevated today. I recommend her to continue exercising, lose weight and consider buying a blood pressure cuff to get her home blood pressure checked. She should continue triamterene hydrochlorothiazide for now we may consider adding a blood pressure medication possibly a beta-blocker based on the stress test results. He should continue Lipitor 20 mg. Last LDL was at target 73 mg/dL. We plan to see her after the stress test (results. []    High complexity decision making was performed  []    Patient is at high-risk of decompensation with multiple organ involvement  She  has a past medical history of Allergic rhinitis, Atrophic vaginitis, DM (diabetes mellitus) ([address] HLD (hyperlipidemia), Hypertension, Neuropathy, and Osteoporosis. Review of system:Patient reports no dyspnea/PND/Orthpnea. She reports no cough/fever/focal neurological deficits/abdominal pain. All other systems negative except as above.    Family History   Problem Relation Age of Onset    Breast Cancer Mother 48        breast    Cancer Mother     Diabetes Brother     Diabetes Brother     Hypertension Brother     Dementia Maternal Grandmother       Social History     Socioeconomic History    Marital status:  Spouse name: Not on file    Number of children: Not on file    Years of education: Not on file    Highest education level: Not on file   Tobacco Use    Smoking status: Never Smoker    Smokeless tobacco: Never Used   Substance and Sexual Activity    Alcohol use: Not Currently    Sexual activity: Yes     Partners: Male      PE  Vitals:    02/18/20 1142   Weight: 137 lb (62.1 kg)   Height: 5' 3\" (1.6 m)    Body mass index is 24.27 kg/m². General:    Alert, cooperative, no distress. Psychiatric:    Normal Mood and affect    Eye/ENT:      Pupils equal, No asymmetry, Conjunctival pink. Able to hear voice at normal amplitude   Lungs:      Visibly symmetric chest expansion, No palpable tenderness. Clear to auscultation bilaterally. Heart[de-identified]    Regular rate and rhythm, S1, S2 normal, no murmur, click, rub or gallop. No JVD, Normal palpable peripheral pulses. No cyanosis   Abdomen:     Soft, non-tender. Bowel sounds normal. No masses,  No      organomegaly. Extremities:   Extremities normal, atraumatic, no edema. Neurologic:   CN II-XII grossly intact.  No gross focal deficits           Recent Labs:  Lab Results   Component Value Date/Time    Cholesterol, total 139 09/16/2019 10:18 AM    HDL Cholesterol 56 09/16/2019 10:18 AM    LDL, calculated 73 09/16/2019 10:18 AM    Triglyceride 48 09/16/2019 10:18 AM     Lab Results   Component Value Date/Time    Creatinine 0.92 09/16/2019 10:18 AM     Lab Results   Component Value Date/Time    BUN 16 09/16/2019 10:18 AM     Lab Results   Component Value Date/Time    Potassium 4.5 09/16/2019 10:18 AM     Lab Results   Component Value Date/Time    Hemoglobin A1c 6.1 (H) 09/16/2019 10:18 AM     Lab Results   Component Value Date/Time    HGB 12.4 09/16/2019 10:18 AM     Lab Results   Component Value Date/Time    PLATELET 401 47/71/7056 10:18 AM       Reviewed:  Past Medical History:   Diagnosis Date    Allergic rhinitis     Atrophic vaginitis     DM [facility]di[facility] mellitus) ([facility]     HLD (hyperlipidemia)     Hypertension     Neuropathy     Osteoporosis     meds started 2007     Social History     Tobacco Use   Smoking Status Never Smoker   Smokeless Tobacco Never Used     Social History     Substance and Sexual Activity   Alcohol Use Not Currently     No Known Allergies  Family History   Problem Relation Age of Onset    Breast Cancer Mother 48        breast    Cancer Mother     Diabetes Brother     Diabetes Brother     Hypertension Brother     Dementia Maternal Grandmother         Current Outpatient Medications   Medication Sig    triamterene-hydroCHLOROthiazide (MAXZIDE) 37.5-25 mg per tablet TAKE 1 TABLET EVERY DAY    atorvastatin (LIPITOR) 20 mg tablet TAKE 1 TABLET EVERY DAY    latanoprost (XALATAN) 0.005 % ophthalmic solution     cetirizine (ZYRTEC) 10 mg tablet Take 1 Tab by mouth nightly.  gabapentin (NEURONTIN) 100 mg capsule Take 1 Cap by mouth two (2) times a day. Max Daily Amount: 200 mg. For tingling, numbness in toes    estradiol (IMVEXXY STARTER PACK VA) Insert  into vagina.  dicyclomine (BENTYL) 10 mg capsule     omeprazole (PRILOSEC) 20 mg capsule Take 1 Cap by mouth daily.  glucose blood VI test strips (BLOOD GLUCOSE TEST) strip daily     No current facility-administered medications for this visit. Harry Cardenas MD02/18/20 There are other unrelated non-urgent complaints, but due to the busy schedule and the amount of time I've already spent with her, time does not permit me to address these routine issues at today's visit. I've requested another appointment to review these additional issues. ATTENTION:   This medical record was transcribed using an electronic medical records/speech recognition system. Although proofread, it may and can contain electronic, spelling and other errors. Corrections may be executed at a later time. Please feel free to contact us for any clarifications as needed.     [facility] [address] [facility] [address] [facility] [address]  [address]

## 2020-02-25 ENCOUNTER — HOSPITAL ENCOUNTER (OUTPATIENT)
Dept: NON INVASIVE DIAGNOSTICS | Age: 70
Discharge: HOME | End: 2020-02-25
Attending: INTERNAL MEDICINE
Payer: MEDICARE

## 2020-02-25 VITALS — DIASTOLIC BLOOD PRESSURE: 66 MMHG | SYSTOLIC BLOOD PRESSURE: 131 MMHG

## 2020-02-25 DIAGNOSIS — R07.9 CHEST PAIN, UNSPECIFIED TYPE: ICD-10-CM

## 2020-02-25 LAB
STRESS ANGINA INDEX: 0
STRESS BASELINE DIAS BP: 77 MMHG
STRESS BASELINE HR: 65 BPM
STRESS BASELINE SYS BP: 145 MMHG
STRESS ESTIMATED WORKLOAD: 9.3 METS
STRESS EXERCISE DUR MIN: NORMAL
STRESS PEAK DIAS BP: 80 MMHG
STRESS PEAK SYS BP: 158 MMHG
STRESS PERCENT HR ACHIEVED: 123 %
STRESS POST PEAK HR: 184 BPM
STRESS RATE PRESSURE PRODUCT: NORMAL BPM*MMHG
STRESS ST DEPRESSION: 0 MM
STRESS ST ELEVATION: 0 MM
STRESS TARGET HR: 150 BPM

## 2020-02-25 PROCEDURE — 93351 STRESS TTE COMPLETE: CPT

## 2020-02-26 ENCOUNTER — TELEPHONE (OUTPATIENT)
Dept: CARDIOLOGY CLINIC | Age: 70
End: 2020-02-26

## 2020-02-26 NOTE — TELEPHONE ENCOUNTER
----- Message from Ashley Kenney MD sent at 2/25/2020 10:19 PM EST -----  No additional comment    · Normal stress echocardiogram. Low risk study.

## 2020-03-03 NOTE — TELEPHONE ENCOUNTER
Attempted to call patient. She was not home at the time.  Savana Marmolejo on [facility] was given test results. Patient to call if she has any questions.

## 2020-05-06 ENCOUNTER — TELEPHONE (OUTPATIENT)
Dept: INTERNAL MEDICINE CLINIC | Age: 70
End: 2020-05-06

## 2020-06-06 RX ORDER — ATORVASTATIN CALCIUM 20 MG/1
TABLET, FILM COATED ORAL
Qty: 90 TAB | Refills: 3 | Status: SHIPPED | OUTPATIENT
Start: 2020-06-06 | End: 2021-06-11

## 2020-06-10 RX ORDER — TRIAMTERENE/HYDROCHLOROTHIAZID 37.5-25 MG
TABLET ORAL
Qty: 90 TAB | Refills: 0 | Status: SHIPPED | OUTPATIENT
Start: 2020-06-10 | End: 2020-08-03

## 2020-06-30 ENCOUNTER — VIRTUAL VISIT (OUTPATIENT)
Dept: INTERNAL MEDICINE CLINIC | Age: 70
End: 2020-06-30

## 2020-06-30 DIAGNOSIS — E11.42 TYPE 2 DIABETES MELLITUS WITH DIABETIC POLYNEUROPATHY, WITHOUT LONG-TERM CURRENT USE OF INSULIN (HCC): Primary | ICD-10-CM

## 2020-06-30 DIAGNOSIS — R68.2 DRY MOUTH: ICD-10-CM

## 2020-06-30 DIAGNOSIS — E78.00 PURE HYPERCHOLESTEROLEMIA: ICD-10-CM

## 2020-06-30 DIAGNOSIS — I10 ESSENTIAL HYPERTENSION: ICD-10-CM

## 2020-06-30 NOTE — PROGRESS NOTES
Marizol Gallego is a 78 y.o. female who was seen by telephone on 06/30/20  Patient has no Virtual Visit capability available at time of visit. Consent: Marizol Bright , who was seen by telephone, and/or her healthcare decision maker, is aware that this patient-initiated, Telephone encounter on 06/30/20 is a billable service, with coverage as determined by his insurance carrier. she is aware that she  may receive a bill and has provided verbal consent to proceed: Yes    Assessment & Plan:   Diagnoses and all orders for this visit:    1. Type 2 diabetes mellitus with diabetic polyneuropathy, without long-term current use of insulin ([facility] [address] - controlled at last check  -     METABOLIC PANEL, COMPREHENSIVE; Future  -     HEMOGLOBIN A1C WITH EAG; Future  -     LIPID PANEL; Future  -     MICROALBUMIN, UR, RAND W/ MICROALB/CREAT RATIO; Future    2. Essential hypertension  Controlled     3. Pure hypercholesterolemia  Labs as above     4. Dry mouth  Question if pt may have Thrush. Encouraged her to bring this up with other PCP whom she is seeing today. 15 minutes spent on this visit with patient today. 712  Subjective:   Marizol Gallego is a 78 y.o. femalewho was seen for   Chief Complaint   Patient presents with    Hypertension     follow up    Cholesterol Problem     follow up    Diabetes     follow up     Pt notes that mouth has been dry. Pt suspects this is related to wearing a mask frequently. Tried Biotene mouthwash. Seeing dentist in September. Seeing another PCP for possible yeast infection later today.      HTN Follow up - BP controlled:  Patient-Reported Vitals 6/30/2020   Patient-Reported Weight 135.6lb   Patient-Reported Pulse 74   Patient-Reported Systolic  486   Patient-Reported Diastolic 75      BP Readings from Last 3 Encounters:   02/25/20 131/66   02/18/20 144/86   02/10/20 137/63     Currently taking:   Key CAD CHF Meds             triamterene-hydroCHLOROthiazide (MAXZIDE) 37.5-25 mg per tablet (Taking) TAKE 1 TABLET EVERY DAY    atorvastatin (LIPITOR) 20 mg tablet (Taking) TAKE 1 TABLET EVERY DAY        DM II - diet controlled. Lab Results   Component Value Date/Time    Hemoglobin A1c 6.1 (H) 09/16/2019 10:18 AM     Lab Results   Component Value Date/Time    Cholesterol, total 139 09/16/2019 10:18 AM    HDL Cholesterol 56 09/16/2019 10:18 AM    LDL, calculated 73 09/16/2019 10:18 AM    VLDL, calculated 10 09/16/2019 10:18 AM    Triglyceride 48 09/16/2019 10:18 AM     Lab Results   Component Value Date/Time    Microalb/Creat ratio (ug/mg creat.) 6.3 09/16/2019 10:18 AM     Currently taking:   Key Antihyperglycemic Medications     Patient is on no antihyperglycemic meds. Review of Systems   Respiratory: Negative for shortness of breath. Cardiovascular: Negative for chest pain and palpitations. Neurological: Negative for dizziness, loss of consciousness and weakness. Objective:     Patient-Reported Vitals 6/30/2020   Patient-Reported Weight 135.6lb   Patient-Reported Pulse 74   Patient-Reported Systolic  363   Patient-Reported Diastolic 75      A&Ox 3. NAD. Breath sounds normal over phone. Conversation normal and consistent with stated mood      We discussed the expected course, resolution and complications of the diagnosis(es) in detail. Medication risks, benefits, costs, interactions, and alternatives were discussed as indicated. I advised him to contact the office if his condition worsens, changes or fails to improve as anticipated. He expressed understanding with the diagnosis(es) and plan. Maricarmen William is a 78 y.o. female who was evaluated by a telephone visit encounter for concerns as above. Patient identification was verified prior to start of the visit. A caregiver was present when appropriate.  Due to this being a telephone encounter (During WTJGJ-45 public health emergency), evaluation of the following organ systems was limited: Vitals/Constitutional/EENT/Resp/CV/GI//MS/Neuro/Skin/Heme-Lymph-Imm. Pursuant to the emergency declaration under the [address] [place] [address], [facility], [facility] waiver authority and the [employer] Act, this Virtual  Visit was conducted, with patient's (and/or legal guardian's) consent, to reduce the patient's risk of exposure to COVID-19 and provide necessary medical care. Services were provided through a telephone discussion to substitute for in-person clinic visit. Patient and provider were located at their individual homes.       Tana Grimm PA-C

## 2020-07-15 ENCOUNTER — TELEPHONE (OUTPATIENT)
Dept: INTERNAL MEDICINE CLINIC | Age: 70
End: 2020-07-15

## 2020-07-15 NOTE — TELEPHONE ENCOUNTER
Patient called to see if the orders for her to get blood work done was still in the system.  Best contact number is [phone]     [facility]

## 2020-08-03 RX ORDER — TRIAMTERENE/HYDROCHLOROTHIAZID 37.5-25 MG
TABLET ORAL
Qty: 90 TAB | Refills: 1 | Status: SHIPPED | OUTPATIENT
Start: 2020-08-03 | End: 2021-01-27

## 2020-09-16 ENCOUNTER — OFFICE VISIT (OUTPATIENT)
Dept: INTERNAL MEDICINE CLINIC | Age: 70
End: 2020-09-16
Payer: MEDICARE

## 2020-09-16 VITALS
BODY MASS INDEX: 23.56 KG/M2 | HEART RATE: 71 BPM | SYSTOLIC BLOOD PRESSURE: 142 MMHG | RESPIRATION RATE: 16 BRPM | WEIGHT: 133 LBS | DIASTOLIC BLOOD PRESSURE: 80 MMHG | TEMPERATURE: 95 F | OXYGEN SATURATION: 100 %

## 2020-09-16 DIAGNOSIS — M54.50 CHRONIC RIGHT-SIDED LOW BACK PAIN WITHOUT SCIATICA: ICD-10-CM

## 2020-09-16 DIAGNOSIS — E11.42 TYPE 2 DIABETES MELLITUS WITH DIABETIC POLYNEUROPATHY, WITHOUT LONG-TERM CURRENT USE OF INSULIN (HCC): Primary | ICD-10-CM

## 2020-09-16 DIAGNOSIS — G89.29 CHRONIC RIGHT-SIDED LOW BACK PAIN WITHOUT SCIATICA: ICD-10-CM

## 2020-09-16 DIAGNOSIS — E11.42 TYPE 2 DIABETES MELLITUS WITH DIABETIC POLYNEUROPATHY, WITHOUT LONG-TERM CURRENT USE OF INSULIN (HCC): ICD-10-CM

## 2020-09-16 DIAGNOSIS — Z23 ENCOUNTER FOR IMMUNIZATION: ICD-10-CM

## 2020-09-16 LAB
ALBUMIN SERPL-MCNC: 4.1 G/DL (ref 3.5–5)
ALBUMIN/GLOB SERPL: 1.2 {RATIO} (ref 1.1–2.2)
ALP SERPL-CCNC: 50 U/L (ref 45–117)
ALT SERPL-CCNC: 17 U/L (ref 12–78)
ANION GAP SERPL CALC-SCNC: 6 MMOL/L (ref 5–15)
AST SERPL-CCNC: 16 U/L (ref 15–37)
BILIRUB SERPL-MCNC: 0.5 MG/DL (ref 0.2–1)
BUN SERPL-MCNC: 13 MG/DL (ref 6–20)
BUN/CREAT SERPL: 14 (ref 12–20)
CALCIUM SERPL-MCNC: 9.8 MG/DL (ref 8.5–10.1)
CHLORIDE SERPL-SCNC: 106 MMOL/L (ref 97–108)
CHOLEST SERPL-MCNC: 140 MG/DL
CO2 SERPL-SCNC: 28 MMOL/L (ref 21–32)
CREAT SERPL-MCNC: 0.91 MG/DL (ref 0.55–1.02)
CREAT UR-MCNC: 166 MG/DL
EST. AVERAGE GLUCOSE BLD GHB EST-MCNC: 123 MG/DL
GLOBULIN SER CALC-MCNC: 3.4 G/DL (ref 2–4)
GLUCOSE SERPL-MCNC: 93 MG/DL (ref 65–100)
HBA1C MFR BLD: 5.9 % (ref 4–5.6)
HDLC SERPL-MCNC: 56 MG/DL
HDLC SERPL: 2.5 {RATIO} (ref 0–5)
LDLC SERPL CALC-MCNC: 71.4 MG/DL (ref 0–100)
LIPID PROFILE,FLP: NORMAL
MICROALBUMIN UR-MCNC: 1.37 MG/DL
MICROALBUMIN/CREAT UR-RTO: 8 MG/G (ref 0–30)
POTASSIUM SERPL-SCNC: 4 MMOL/L (ref 3.5–5.1)
PROT SERPL-MCNC: 7.5 G/DL (ref 6.4–8.2)
SODIUM SERPL-SCNC: 140 MMOL/L (ref 136–145)
TRIGL SERPL-MCNC: 63 MG/DL (ref ?–150)
VLDLC SERPL CALC-MCNC: 12.6 MG/DL

## 2020-09-16 PROCEDURE — G8752 SYS BP LESS 140: HCPCS | Performed by: PHYSICIAN ASSISTANT

## 2020-09-16 PROCEDURE — G9899 SCRN MAM PERF RSLTS DOC: HCPCS | Performed by: PHYSICIAN ASSISTANT

## 2020-09-16 PROCEDURE — 99214 OFFICE O/P EST MOD 30 MIN: CPT | Performed by: PHYSICIAN ASSISTANT

## 2020-09-16 PROCEDURE — G8432 DEP SCR NOT DOC, RNG: HCPCS | Performed by: PHYSICIAN ASSISTANT

## 2020-09-16 PROCEDURE — 90653 IIV ADJUVANT VACCINE IM: CPT | Performed by: PHYSICIAN ASSISTANT

## 2020-09-16 PROCEDURE — G0008 ADMIN INFLUENZA VIRUS VAC: HCPCS | Performed by: PHYSICIAN ASSISTANT

## 2020-09-16 PROCEDURE — 1101F PT FALLS ASSESS-DOCD LE1/YR: CPT | Performed by: PHYSICIAN ASSISTANT

## 2020-09-16 PROCEDURE — 3017F COLORECTAL CA SCREEN DOC REV: CPT | Performed by: PHYSICIAN ASSISTANT

## 2020-09-16 PROCEDURE — G8536 NO DOC ELDER MAL SCRN: HCPCS | Performed by: PHYSICIAN ASSISTANT

## 2020-09-16 PROCEDURE — G8754 DIAS BP LESS 90: HCPCS | Performed by: PHYSICIAN ASSISTANT

## 2020-09-16 PROCEDURE — G8399 PT W/DXA RESULTS DOCUMENT: HCPCS | Performed by: PHYSICIAN ASSISTANT

## 2020-09-16 PROCEDURE — G8427 DOCREV CUR MEDS BY ELIG CLIN: HCPCS | Performed by: PHYSICIAN ASSISTANT

## 2020-09-16 PROCEDURE — 2022F DILAT RTA XM EVC RTNOPTHY: CPT | Performed by: PHYSICIAN ASSISTANT

## 2020-09-16 PROCEDURE — 3046F HEMOGLOBIN A1C LEVEL >9.0%: CPT | Performed by: PHYSICIAN ASSISTANT

## 2020-09-16 PROCEDURE — 1090F PRES/ABSN URINE INCON ASSESS: CPT | Performed by: PHYSICIAN ASSISTANT

## 2020-09-16 PROCEDURE — G8420 CALC BMI NORM PARAMETERS: HCPCS | Performed by: PHYSICIAN ASSISTANT

## 2020-09-16 NOTE — PATIENT INSTRUCTIONS
Vaccine Information Statement Influenza (Flu) Vaccine (Inactivated or Recombinant): What You Need to Know Many Vaccine Information Statements are available in Bulgarian and other languages. See www.immunize.org/vis Hojas de información sobre vacunas están disponibles en español y en muchos otros idiomas. Visite www.immunize.org/vis 1. Why get vaccinated? Influenza vaccine can prevent influenza (flu). Flu is a contagious disease that spreads around the [facility] every year, usually between October and May. Anyone can get the flu, but it is more dangerous for some people. Infants and young children, people 72years of age and older, pregnant women, and people with certain health conditions or a weakened immune system are at greatest risk of flu complications. Pneumonia, bronchitis, sinus infections and ear infections are examples of flu-related complications. If you have a medical condition, such as heart disease, cancer or diabetes, flu can make it worse. Flu can cause fever and chills, sore throat, muscle aches, fatigue, cough, headache, and runny or stuffy nose. Some people may have vomiting and diarrhea, though this is more common in children than adults. Each year thousands of people in the [facility] die from flu, and many more are hospitalized. Flu vaccine prevents millions of illnesses and flu-related visits to the doctor each year. 2. Influenza vaccines CDC recommends everyone 10months of age and older get vaccinated every flu season. Children 6 months through 6years of age may need 2 doses during a single flu season. Everyone else needs only 1 dose each flu season. It takes about 2 weeks for protection to develop after vaccination. There are many flu viruses, and they are always changing. Each year a new flu vaccine is made to protect against three or four viruses that are likely to cause disease in the upcoming flu season.  Even when the vaccine doesnt exactly match these viruses, it may still provide some protection. Influenza vaccine does not cause flu. Influenza vaccine may be given at the same time as other vaccines. 3. Talk with your health care provider Tell your vaccine provider if the person getting the vaccine: 
 Has had an allergic reaction after a previous dose of influenza vaccine, or has any severe, life-threatening allergies.  Has ever had Guillain-Barré Syndrome (also called GBS). In some cases, your health care provider may decide to postpone influenza vaccination to a future visit. People with minor illnesses, such as a cold, may be vaccinated. People who are moderately or severely ill should usually wait until they recover before getting influenza vaccine. Your health care provider can give you more information. 4. Risks of a reaction  Soreness, redness, and swelling where shot is given, fever, muscle aches, and headache can happen after influenza vaccine.  There may be a very small increased risk of Guillain-Barré Syndrome (GBS) after inactivated influenza vaccine (the flu shot). [facility] children who get the flu shot along with pneumococcal vaccine (PCV13), and/or DTaP vaccine at the same time might be slightly more likely to have a seizure caused by fever. Tell your health care provider if a child who is getting flu vaccine has ever had a seizure. People sometimes faint after medical procedures, including vaccination. Tell your provider if you feel dizzy or have vision changes or ringing in the ears. As with any medicine, there is a very remote chance of a vaccine causing a severe allergic reaction, other serious injury, or death. 5. What if there is a serious problem? An allergic reaction could occur after the vaccinated person leaves the clinic.  If you see signs of a severe allergic reaction (hives, swelling of the face and throat, difficulty breathing, a fast heartbeat, dizziness, or weakness), call 9-1-1 and get the person to the nearest hospital. 
 
For other signs that concern you, call your health care provider. Adverse reactions should be reported to the Vaccine Adverse Event Reporting System (VAERS). Your health care provider will usually file this report, or you can do it yourself. Visit the VAERS website at www.vaers. hhs.gov or call 6-820.951.8527. VAERS is only for reporting reactions, and VAERS staff do not give medical advice. 6. The National Vaccine Injury Compensation Program 
 
The [facility] Vaccine Injury Compensation Program (VICP) is a federal program that was created to compensate people who may have been injured by certain vaccines. Visit the VICP website at www.[facility].gov/vaccinecompensation or call 6-870.134.4829 to learn about the program and about filing a claim. There is a time limit to file a claim for compensation. 7. How can I learn more?  Ask your health care provider.  Call your local or state health department.  Contact the Centers for Disease Control and Prevention (CDC): 
- Call 5-958.672.7140 (4-073-AEO-INFO) or 
- Visit CDCs influenza website at www.cdc.gov/flu Vaccine Information Statement (Interim) Inactivated Influenza Vaccine 8/15/2019 
42 GAYLA Ye [id number] Department of Health and [employer] Centers for Disease Control and Prevention Office Use Only

## 2020-09-16 NOTE — PROGRESS NOTES
Sandeep Swanson is a 78 y.o. female  Chief Complaint   Patient presents with    Blood Pressure Check     follow up    Diabetes     follow up    Immunization/Injection     high dose flu shot      Visit Vitals  /74   Pulse 71   Temp (!) 95 °F (35 °C) (Temporal)   Resp 16   Wt 133 lb (60.3 kg)   SpO2 100%   BMI 23.56 kg/m²      Health Maintenance Due   Topic Date Due    Eye Exam Retinal or Dilated  10/19/1960    Shingrix Vaccine Age 50> (1 of 2) 10/19/1999    GLAUCOMA SCREENING Q2Y  08/01/2018    Foot Exam Q1  08/07/2019    DTaP/Tdap/Td series (2 - Td) 09/13/2019    A1C test (Diabetic or Prediabetic)  09/16/2020    MICROALBUMIN Q1  09/16/2020    Lipid Screen  09/16/2020       HPI    HTN Follow up - BP controlled:  Patient-Reported Vitals 6/30/2020   Patient-Reported Weight 135.6lb   Patient-Reported Pulse 74   Patient-Reported Systolic  155   Patient-Reported Diastolic 75      BP Readings from Last 3 Encounters:   09/16/20 134/74   02/25/20 131/66   02/18/20 144/86     Currently taking:   Key CAD CHF Meds             triamterene-hydroCHLOROthiazide (MAXZIDE) 37.5-25 mg per tablet (Taking) TAKE 1 TABLET EVERY DAY    atorvastatin (LIPITOR) 20 mg tablet (Taking) TAKE 1 TABLET EVERY DAY        DM II - controlled at last check. Overdue for recheck. Agrees to come in for routine 6 month follow ups. Lab Results   Component Value Date/Time    Hemoglobin A1c 6.1 (H) 09/16/2019 10:18 AM     Lab Results   Component Value Date/Time    Cholesterol, total 139 09/16/2019 10:18 AM    HDL Cholesterol 56 09/16/2019 10:18 AM    LDL, calculated 73 09/16/2019 10:18 AM    VLDL, calculated 10 09/16/2019 10:18 AM    Triglyceride 48 09/16/2019 10:18 AM     Lab Results   Component Value Date/Time    Microalb/Creat ratio (ug/mg creat.) 6.3 09/16/2019 10:18 AM     Currently taking:   Key Antihyperglycemic Medications     Patient is on no antihyperglycemic meds. Has been working on diet & exercise to lose weight.  Pleased with progress. Wt Readings from Last 3 Encounters:   09/16/20 133 lb (60.3 kg)   02/18/20 137 lb (62.1 kg)   02/10/20 138 lb (62.6 kg)     2 months of R lower back pain. Tylenol arthritis and heating pad helped. Exercising with aerobics & weight lifting 3 days/week. No stretching for back. ROS  Review of Systems   Respiratory: Negative for shortness of breath. Cardiovascular: Negative for chest pain and palpitations. Neurological: Negative for dizziness, loss of consciousness and weakness. EXAM  Physical Exam  Vitals signs and nursing note reviewed. Constitutional:       General: She is not in acute distress. Appearance: She is well-developed. HENT:      Head: Normocephalic and atraumatic. Neck:      Musculoskeletal: Neck supple. Vascular: No JVD. Cardiovascular:      Rate and Rhythm: Normal rate and regular rhythm. Heart sounds: Normal heart sounds. Pulmonary:      Effort: Pulmonary effort is normal. No respiratory distress. Breath sounds: Normal breath sounds. Musculoskeletal: Normal range of motion. Comments: Mild spasm noted B lumbar musculature. Skin:     General: Skin is warm and dry. Neurological:      Mental Status: She is alert and oriented to person, place, and time. Psychiatric:         Mood and Affect: Mood normal.         Behavior: Behavior normal.         Thought Content:  Thought content normal.         Judgment: Judgment normal.     Diabetic foot exam:     Left: Reflexes 1+     Vibratory sensation normal    Proprioception normal   Sharp/dull discrimination normal    Filament test normal sensation with micro filament   Pulse DP: 2+ (normal)   Pulse PT: 2+ (normal)   Deformities: Mild - callus throughout B dorsal feet  Right: Reflexes 1+   Vibratory sensation normal   Proprioception normal   Sharp/dull discrimination normal   Filament test normal sensation with micro filament   Pulse DP: 2+ (normal)   Pulse PT: 2+ (normal)   Deformities: Mild - callus throughout B dorsal feet    ASSESSMENT/PLAN  1. Encounter for immunization  - INFLUENZA VIRUS VACCINE, HIGH DOSE SEASONAL, PRESERVATIVE FREE    2. Type 2 diabetes mellitus with diabetic polyneuropathy, without long-term current use of insulin ([facility])  - METABOLIC PANEL, COMPREHENSIVE; Future  - HEMOGLOBIN A1C WITH EAG; Future  - LIPID PANEL; Future  - MICROALBUMIN, UR, RAND W/ MICROALB/CREAT RATIO; Future  -  DIABETES FOOT EXAM; Future    3. Chronic right-sided low back pain without sciatica  Continue heating pad BID. Gave stretches to try. INI 2-3 weeks, will order xray and PT.

## 2020-09-16 NOTE — PROGRESS NOTES
1. Have you been to the ER, urgent care clinic since your last visit? Hospitalized since your last visit? No    2. Have you seen or consulted any other health care providers outside of the [address] [place] [address] since your last visit? Include any pap smears or colon screening.  No   Chief Complaint   Patient presents with    Blood Pressure Check     follow up    Diabetes     follow up    Immunization/Injection     high dose flu shot

## 2020-11-09 ENCOUNTER — TELEPHONE (OUTPATIENT)
Dept: INTERNAL MEDICINE CLINIC | Age: 70
End: 2020-11-09

## 2020-11-09 DIAGNOSIS — Z12.31 ENCOUNTER FOR SCREENING MAMMOGRAM FOR MALIGNANT NEOPLASM OF BREAST: Primary | ICD-10-CM

## 2020-11-09 NOTE — TELEPHONE ENCOUNTER
Pt needs help with scheduling mammogram.       Callback required yes/no and why: Yes, clarification. Best contact number(s): [phone]       Details to clarify the request:  Pt called to confirm her December mammogram appt but there was nothing on the schedule for her, and the next available appt is 01/11/2021. Pt needs a new mammogram order submitted dated January 2021 per her conversation with [address] [place] [address].  Pt's explanation was difficult to follow.          tio

## 2020-11-10 ENCOUNTER — TRANSCRIBE ORDER (OUTPATIENT)
Dept: SCHEDULING | Age: 70
End: 2020-11-10

## 2020-11-10 DIAGNOSIS — Z12.31 VISIT FOR SCREENING MAMMOGRAM: Primary | ICD-10-CM

## 2020-11-11 NOTE — TELEPHONE ENCOUNTER
Looks like she now has a mammogram scheduled for Jan.   I put an order in the system for a Jan mammogram.   Please let her know.

## 2020-12-02 ENCOUNTER — NURSE TRIAGE (OUTPATIENT)
Dept: OTHER | Facility: CLINIC | Age: 70
End: 2020-12-02

## 2020-12-02 ENCOUNTER — OFFICE VISIT (OUTPATIENT)
Dept: INTERNAL MEDICINE CLINIC | Age: 70
End: 2020-12-02
Payer: MEDICARE

## 2020-12-02 ENCOUNTER — TELEPHONE (OUTPATIENT)
Dept: INTERNAL MEDICINE CLINIC | Age: 70
End: 2020-12-02

## 2020-12-02 VITALS
TEMPERATURE: 98 F | OXYGEN SATURATION: 99 % | SYSTOLIC BLOOD PRESSURE: 137 MMHG | HEART RATE: 78 BPM | BODY MASS INDEX: 24.59 KG/M2 | DIASTOLIC BLOOD PRESSURE: 80 MMHG | WEIGHT: 138.8 LBS | RESPIRATION RATE: 18 BRPM

## 2020-12-02 DIAGNOSIS — R05.9 COUGH IN ADULT: ICD-10-CM

## 2020-12-02 DIAGNOSIS — J02.8 PHARYNGITIS DUE TO OTHER ORGANISM: Primary | ICD-10-CM

## 2020-12-02 LAB
S PYO AG THROAT QL: NEGATIVE
VALID INTERNAL CONTROL?: YES

## 2020-12-02 PROCEDURE — 1100F PTFALLS ASSESS-DOCD GE2>/YR: CPT | Performed by: INTERNAL MEDICINE

## 2020-12-02 PROCEDURE — G8536 NO DOC ELDER MAL SCRN: HCPCS | Performed by: INTERNAL MEDICINE

## 2020-12-02 PROCEDURE — G8427 DOCREV CUR MEDS BY ELIG CLIN: HCPCS | Performed by: INTERNAL MEDICINE

## 2020-12-02 PROCEDURE — G8420 CALC BMI NORM PARAMETERS: HCPCS | Performed by: INTERNAL MEDICINE

## 2020-12-02 PROCEDURE — G8752 SYS BP LESS 140: HCPCS | Performed by: INTERNAL MEDICINE

## 2020-12-02 PROCEDURE — 99213 OFFICE O/P EST LOW 20 MIN: CPT | Performed by: INTERNAL MEDICINE

## 2020-12-02 PROCEDURE — 87880 STREP A ASSAY W/OPTIC: CPT | Performed by: INTERNAL MEDICINE

## 2020-12-02 PROCEDURE — 3017F COLORECTAL CA SCREEN DOC REV: CPT | Performed by: INTERNAL MEDICINE

## 2020-12-02 PROCEDURE — G8399 PT W/DXA RESULTS DOCUMENT: HCPCS | Performed by: INTERNAL MEDICINE

## 2020-12-02 PROCEDURE — 1090F PRES/ABSN URINE INCON ASSESS: CPT | Performed by: INTERNAL MEDICINE

## 2020-12-02 PROCEDURE — G9899 SCRN MAM PERF RSLTS DOC: HCPCS | Performed by: INTERNAL MEDICINE

## 2020-12-02 PROCEDURE — 3288F FALL RISK ASSESSMENT DOCD: CPT | Performed by: INTERNAL MEDICINE

## 2020-12-02 PROCEDURE — G8510 SCR DEP NEG, NO PLAN REQD: HCPCS | Performed by: INTERNAL MEDICINE

## 2020-12-02 PROCEDURE — G8754 DIAS BP LESS 90: HCPCS | Performed by: INTERNAL MEDICINE

## 2020-12-02 NOTE — PROGRESS NOTES
Subjective:      Patient : This patient is a .age, .sex that presents with a chief complaint of cough: And sore throat  non productive and  no shortness of breath increasing with exertion and muscular pain ant chests. The symptoms have been present for 7 days. They have remained unchanged.     . james exposure other than grandkids    Objective:     Visit Vitals  /80   Pulse 78   Temp 98 °F (36.7 °C)   Resp 18   Wt 138 lb 12.8 oz (63 kg)   SpO2 99%   BMI 24.59 kg/m²          Skin:  warm  HEENT:  pharyngeal erythema  tosils present no exudate no adenopathy  Heart regular rhythm  Lungs: clear to auscultation and percussion throughout both lung fields  CV:  Abdomen   Extremeties:  Neuro alert, oriented x 3 and no focal deficits      Past Medical History:   Diagnosis Date    Allergic rhinitis     Atrophic vaginitis     DM (diabetes mellitus) (HCC)     HLD (hyperlipidemia)     Hypertension     Neuropathy     Osteoporosis     meds started 2007     Family History   Problem Relation Age of Onset    Breast Cancer Mother 48        breast    Cancer Mother     Diabetes Brother     Diabetes Brother     Hypertension Brother     Dementia Maternal Grandmother      Current Outpatient Medications   Medication Sig Dispense Refill    triamterene-hydroCHLOROthiazide (MAXZIDE) 37.5-25 mg per tablet TAKE 1 TABLET EVERY DAY 90 Tab 1    atorvastatin (LIPITOR) 20 mg tablet TAKE 1 TABLET EVERY DAY 90 Tab 3    latanoprost (XALATAN) 0.005 % ophthalmic solution       glucose blood VI test strips (BLOOD GLUCOSE TEST) strip daily 1 Each 11     No Known Allergies  Social History     Socioeconomic History    Marital status:      Spouse name: Not on file    Number of children: Not on file    Years of education: Not on file    Highest education level: Not on file   Occupational History    Not on file   Social Needs    Financial resource strain: Not on file    Food insecurity     Worry: Not on file     Inability: Not on file   [employer] needs     Medical: Not on file     Non-medical: Not on file   Tobacco Use    Smoking status: Never Smoker    Smokeless tobacco: Never Used   Substance and Sexual Activity    Alcohol use: Not Currently    Drug use: Not on file    Sexual activity: Yes     Partners: Male   Lifestyle    Physical activity     Days per week: Not on file     Minutes per session: Not on file    Stress: Not on file   Relationships    Social connections     Talks on phone: Not on file     Gets together: Not on file     Attends [employer] service: Not on file     Active member of club or organization: Not on file     Attends meetings of clubs or organizations: Not on file     Relationship status: Not on file    Intimate partner violence     Fear of current or ex partner: Not on file     Emotionally abused: Not on file     Physically abused: Not on file     Forced sexual activity: Not on file   Other Topics Concern    Not on file   Social History Narrative    Not on file           Assessment:     Pharyngitis neg for strep    Plan:     The patient seems to have a viral process. Will institute symptomatic therapy  Diagnoses and all orders for this visit:    1. Pharyngitis due to other organism  -     AMB POC RAPID STREP A  -     NOVEL CORONAVIRUS (COVID-19)    2.  Cough in adult  -     NOVEL CORONAVIRUS (COVID-19)      Advise   Throat lozenges and gargle use motrin prn

## 2020-12-02 NOTE — PROGRESS NOTES
1. Have you been to the ER, urgent care clinic since your last visit? Hospitalized since your last visit? No    2. Have you seen or consulted any other health care providers outside of the [address] [place] [address] since your last visit? Include any pap smears or colon screening.  No   Chief Complaint   Patient presents with    Sore Throat

## 2020-12-02 NOTE — TELEPHONE ENCOUNTER
Received call from [facility]. Call soft transferred to [facility] AT [facility] to schedule appointment. Attention Provider: Thank you for allowing me to participate in the care of your patient. The  patient was connected to triage in response to information provided to the [facility]. Please do not respond through this encounter as the response is not directed to a shared pool. Reason for Disposition   SEVERE sore throat pain    Answer Assessment - Initial Assessment Questions  1. ONSET: \"When did the throat start hurting? \" (Hours or days ago)       The week of thanks giving     2. SEVERITY: \"How bad is the sore throat? \" (Scale 1-10; mild, moderate or severe)    - MILD (1-3):  doesn't interfere with eating or normal activities    - MODERATE (4-7): interferes with eating some solids and normal activities    - SEVERE (8-10):  excruciating pain, interferes with most normal activities    - SEVERE DYSPHAGIA: can't swallow liquids, drooling      6/10    3. STREP EXPOSURE: \"Has there been any exposure to strep within the past week? \" If so, ask: \"What type of contact occurred? \"       Not that she is aware of     4. VIRAL SYMPTOMS: Adam Renee there any symptoms of a cold, such as a runny nose, cough, hoarse voice or red eyes? \"       Tightness in chest yesterday, sinus pressure in head     5. FEVER: \"Do you have a fever? \" If so, ask: \"What is your temperature, how was it measured, and when did it start? \"      Denies     6. PUS ON THE TONSILS: \"Is there pus on the tonsils in the back of your throat? \"      Has not looked     7. OTHER SYMPTOMS: \"Do you have any other symptoms? \" (e.g., difficulty breathing, headache, rash)      Slight headache, chest congestion, sinus congestion     8. PREGNANCY: \"Is there any chance you are pregnant? \" \"When was your last menstrual period? \"      N/a    Protocols used: SORE THROAT-ADULT-[place]

## 2020-12-02 NOTE — TELEPHONE ENCOUNTER
today       Scheduled appointment date and time:       Reason for appointment: cold like sx       Details to clarify the request:Pt has cold like sx(sore throat and congestion) and requested an appt for today.  Pt was transferred by the nurse triage line      envera

## 2020-12-06 LAB — SARS-COV-2, NAA: NOT DETECTED

## 2020-12-07 ENCOUNTER — TELEPHONE (OUTPATIENT)
Dept: INTERNAL MEDICINE CLINIC | Age: 70
End: 2020-12-07

## 2020-12-07 NOTE — TELEPHONE ENCOUNTER
Patient requesting call back in ref to the test results from Friday. she had on Friday requesting that.  MANI Ovalles Patient still has sore throat and headache

## 2020-12-08 RX ORDER — AMOXICILLIN 875 MG/1
875 TABLET, FILM COATED ORAL 2 TIMES DAILY
Qty: 20 TAB | Refills: 0 | Status: SHIPPED | OUTPATIENT
Start: 2020-12-08 | End: 2021-03-17 | Stop reason: ALTCHOICE

## 2020-12-08 NOTE — TELEPHONE ENCOUNTER
Negative COVID and Strep tests. Good! Since it's been longer than 1 week, and symptoms persist, I will send antibiotic now. Please inform pt.

## 2021-01-11 ENCOUNTER — HOSPITAL ENCOUNTER (OUTPATIENT)
Dept: MAMMOGRAPHY | Age: 71
Discharge: HOME | End: 2021-01-11
Attending: PHYSICIAN ASSISTANT
Payer: MEDICARE

## 2021-01-11 DIAGNOSIS — Z12.31 VISIT FOR SCREENING MAMMOGRAM: ICD-10-CM

## 2021-01-11 PROCEDURE — 77067 SCR MAMMO BI INCL CAD: CPT

## 2021-01-27 RX ORDER — TRIAMTERENE/HYDROCHLOROTHIAZID 37.5-25 MG
TABLET ORAL
Qty: 90 TAB | Refills: 1 | Status: SHIPPED | OUTPATIENT
Start: 2021-01-27 | End: 2021-08-09

## 2021-03-17 ENCOUNTER — OFFICE VISIT (OUTPATIENT)
Dept: INTERNAL MEDICINE CLINIC | Age: 71
End: 2021-03-17
Payer: MEDICARE

## 2021-03-17 VITALS
RESPIRATION RATE: 16 BRPM | HEIGHT: 63 IN | TEMPERATURE: 97 F | HEART RATE: 82 BPM | DIASTOLIC BLOOD PRESSURE: 70 MMHG | OXYGEN SATURATION: 98 % | BODY MASS INDEX: 24.13 KG/M2 | WEIGHT: 136.2 LBS | SYSTOLIC BLOOD PRESSURE: 128 MMHG

## 2021-03-17 DIAGNOSIS — Z00.00 ENCOUNTER FOR MEDICARE ANNUAL WELLNESS EXAM: Primary | ICD-10-CM

## 2021-03-17 DIAGNOSIS — I10 ESSENTIAL HYPERTENSION: ICD-10-CM

## 2021-03-17 DIAGNOSIS — E11.42 TYPE 2 DIABETES MELLITUS WITH DIABETIC POLYNEUROPATHY, WITHOUT LONG-TERM CURRENT USE OF INSULIN (HCC): ICD-10-CM

## 2021-03-17 PROCEDURE — G8427 DOCREV CUR MEDS BY ELIG CLIN: HCPCS | Performed by: PHYSICIAN ASSISTANT

## 2021-03-17 PROCEDURE — G8536 NO DOC ELDER MAL SCRN: HCPCS | Performed by: PHYSICIAN ASSISTANT

## 2021-03-17 PROCEDURE — G8420 CALC BMI NORM PARAMETERS: HCPCS | Performed by: PHYSICIAN ASSISTANT

## 2021-03-17 PROCEDURE — 3046F HEMOGLOBIN A1C LEVEL >9.0%: CPT | Performed by: PHYSICIAN ASSISTANT

## 2021-03-17 PROCEDURE — G8754 DIAS BP LESS 90: HCPCS | Performed by: PHYSICIAN ASSISTANT

## 2021-03-17 PROCEDURE — G8399 PT W/DXA RESULTS DOCUMENT: HCPCS | Performed by: PHYSICIAN ASSISTANT

## 2021-03-17 PROCEDURE — G0439 PPPS, SUBSEQ VISIT: HCPCS | Performed by: PHYSICIAN ASSISTANT

## 2021-03-17 PROCEDURE — G8432 DEP SCR NOT DOC, RNG: HCPCS | Performed by: PHYSICIAN ASSISTANT

## 2021-03-17 PROCEDURE — G8752 SYS BP LESS 140: HCPCS | Performed by: PHYSICIAN ASSISTANT

## 2021-03-17 PROCEDURE — 3288F FALL RISK ASSESSMENT DOCD: CPT | Performed by: PHYSICIAN ASSISTANT

## 2021-03-17 PROCEDURE — 99213 OFFICE O/P EST LOW 20 MIN: CPT | Performed by: PHYSICIAN ASSISTANT

## 2021-03-17 PROCEDURE — G9899 SCRN MAM PERF RSLTS DOC: HCPCS | Performed by: PHYSICIAN ASSISTANT

## 2021-03-17 PROCEDURE — 2022F DILAT RTA XM EVC RTNOPTHY: CPT | Performed by: PHYSICIAN ASSISTANT

## 2021-03-17 PROCEDURE — 1100F PTFALLS ASSESS-DOCD GE2>/YR: CPT | Performed by: PHYSICIAN ASSISTANT

## 2021-03-17 PROCEDURE — 3017F COLORECTAL CA SCREEN DOC REV: CPT | Performed by: PHYSICIAN ASSISTANT

## 2021-03-17 PROCEDURE — 1090F PRES/ABSN URINE INCON ASSESS: CPT | Performed by: PHYSICIAN ASSISTANT

## 2021-03-17 NOTE — PATIENT INSTRUCTIONS
Medicare Wellness Visit, Female The best way to live healthy is to have a lifestyle where you eat a well-balanced diet, exercise regularly, limit alcohol use, and quit all forms of tobacco/nicotine, if applicable. Regular preventive services are another way to keep healthy. Preventive services (vaccines, screening tests, monitoring & exams) can help personalize your care plan, which helps you manage your own care. Screening tests can find health problems at the earliest stages, when they are easiest to treat. [facility] follows the current, evidence-based guidelines published by the [facility] ([facility]) when recommending preventive services for our patients. Because we follow these guidelines, sometimes recommendations change over time as research supports it. (For example, mammograms used to be recommended annually. Even though Medicare will still pay for an annual mammogram, the newer guidelines recommend a mammogram every two years for women of average risk). Of course, you and your doctor may decide to screen more often for some diseases, based on your risk and your co-morbidities (chronic disease you are already diagnosed with). Preventive services for you include: - Medicare offers their members a free annual wellness visit, which is time for you and your primary care provider to discuss and plan for your preventive service needs. Take advantage of this benefit every year! 
-All adults over the age of 72 should receive the recommended pneumonia vaccines. Current USPSTF guidelines recommend a series of two vaccines for the best pneumonia protection.  
-All adults should have a flu vaccine yearly and a tetanus vaccine every 10 years.  
-All adults age 48 and older should receive the shingles vaccines (series of two vaccines).      
-All adults age 38-68 who are overweight should have a diabetes screening test once every three years.  
-All adults born between [birth year] and 1966 should be screened once for Hepatitis C. 
-Other screening tests and preventive services for persons with diabetes include: an eye exam to screen for diabetic retinopathy, a kidney function test, a foot exam, and stricter control over your cholesterol.  
-Cardiovascular screening for adults with routine risk involves an electrocardiogram (ECG) at intervals determined by your doctor.  
-Colorectal cancer screenings should be done for adults age 54-65 with no increased risk factors for colorectal cancer. There are a number of acceptable methods of screening for this type of cancer. Each test has its own benefits and drawbacks. Discuss with your doctor what is most appropriate for you during your annual wellness visit. The different tests include: colonoscopy (considered the best screening method), a fecal occult blood test, a fecal DNA test, and sigmoidoscopy. 
 
-A bone mass density test is recommended when a woman turns 65 to screen for osteoporosis. This test is only recommended one time, as a screening. Some providers will use this same test as a disease monitoring tool if you already have osteoporosis. -Breast cancer screenings are recommended every other year for women of normal risk, age 54-69. 
-Cervical cancer screenings for women over age 72 are only recommended with certain risk factors. Here is a list of your current Health Maintenance items (your personalized list of preventive services) with a due date: 
Health Maintenance Due Topic Date Due  Eye Exam  Never done  COVID-19 Vaccine (1) Never done  Shingles Vaccine (1 of 2) Never done  Glaucoma Screening   08/01/2018  Diabetic Foot Care  08/07/2019  
 DTaP/Tdap/Td  (2 - Td) 09/13/2019 [facility] Annual Well Visit  02/10/2021

## 2021-03-17 NOTE — PROGRESS NOTES
1. Have you been to the ER, urgent care clinic since your last visit? Hospitalized since your last visit? No    2. Have you seen or consulted any other health care providers outside of the [address] [place] [address] since your last visit? Include any pap smears or colon screening.  No   Chief Complaint   Patient presents with    Hypertension     follow up    Diabetes     follow up

## 2021-03-17 NOTE — PROGRESS NOTES
Jerome Bonner is a 69 y.o. female  Chief Complaint   Patient presents with    Hypertension     follow up    Diabetes     follow up     Visit Vitals  /70   Pulse 82   Temp 97 °F (36.1 °C) (Temporal)   Resp 16   Ht 5' 3\" (1.6 m)   Wt 136 lb 3.2 oz (61.8 kg)   SpO2 98%   BMI 24.13 kg/m²      Health Maintenance Due   Topic Date Due    Eye Exam Retinal or Dilated  Never done    COVID-19 Vaccine (1) Never done    Shingrix Vaccine Age 50> (1 of 2) Never done    GLAUCOMA SCREENING Q2Y  08/01/2018    Foot Exam Q1  08/07/2019    DTaP/Tdap/Td series (2 - Td) 09/13/2019    Medicare Yearly Exam  02/10/2021     HPI  HTN Follow up - BP controlled at recheck today:  BP Readings from Last 3 Encounters:   03/17/21 128/70   12/02/20 137/80   09/16/20 (!) 142/80     Currently taking:   Key CAD CHF Meds             triamterene-hydroCHLOROthiazide (MAXZIDE) 37.5-25 mg per tablet (Taking) TAKE 1 TABLET EVERY DAY    atorvastatin (LIPITOR) 20 mg tablet (Taking) TAKE 1 TABLET EVERY DAY        DM II - well controlled at last check. Lab Results   Component Value Date/Time    Hemoglobin A1c 5.9 (H) 09/16/2020 10:08 AM     Lab Results   Component Value Date/Time    Cholesterol, total 140 09/16/2020 10:08 AM    HDL Cholesterol 56 09/16/2020 10:08 AM    LDL, calculated 71.4 09/16/2020 10:08 AM    VLDL, calculated 12.6 09/16/2020 10:08 AM    Triglyceride 63 09/16/2020 10:08 AM    CHOL/HDL Ratio 2.5 09/16/2020 10:08 AM     Lab Results   Component Value Date/Time    Microalbumin/Creat ratio (mg/g creat) 8 09/16/2020 10:08 AM    Microalbumin,urine random 1.37 09/16/2020 10:08 AM     Currently taking:   Key Antihyperglycemic Medications     Patient is on no antihyperglycemic meds. Pt notes that she is exercising 3x/week but eating bread, rice, sweets more than usually lately, so she expects her A1c will be higher than usual this time. She feels that she can work on diet now. Weight is WNL and stable.    Wt Readings from Last 3 Encounters:   03/17/21 136 lb 3.2 oz (61.8 kg)   12/02/20 138 lb 12.8 oz (63 kg)   09/16/20 133 lb (60.3 kg)       ROS  Review of Systems   Constitutional: Negative for fever and malaise/fatigue. Respiratory: Negative for shortness of breath. Cardiovascular: Negative for chest pain and palpitations. Musculoskeletal: Negative for falls. Neurological: Negative for dizziness, loss of consciousness and weakness. Psychiatric/Behavioral: Negative for depression and substance abuse. EXAM  Physical Exam  Vitals signs and nursing note reviewed. Constitutional:       General: She is not in acute distress. Appearance: She is well-developed. HENT:      Head: Normocephalic and atraumatic. Neck:      Musculoskeletal: Neck supple. Vascular: No JVD. Cardiovascular:      Rate and Rhythm: Normal rate and regular rhythm. Heart sounds: Normal heart sounds. Pulmonary:      Effort: Pulmonary effort is normal. No respiratory distress. Breath sounds: Normal breath sounds. Skin:     General: Skin is warm and dry. Neurological:      Mental Status: She is alert and oriented to person, place, and time. Psychiatric:         Mood and Affect: Mood normal.         Behavior: Behavior normal.         Thought Content: Thought content normal.         Judgment: Judgment normal.       Diabetic foot exam:     Left: Reflexes 1+     Vibratory sensation normal    Proprioception normal   Sharp/dull discrimination normal    Filament test normal sensation with micro filament   Pulse DP: 2+ (normal)   Pulse PT: 2+ (normal)   Deformities: Mild - callus throughout B dorsal feet  Right: Reflexes 1+   Vibratory sensation normal   Proprioception normal   Sharp/dull discrimination normal   Filament test normal sensation with micro filament   Pulse DP: 2+ (normal)   Pulse PT: 2+ (normal)   Deformities: Mild - callus throughout B dorsal feet    ASSESSMENT/PLAN  1.  Type 2 diabetes mellitus with diabetic polyneuropathy, without long-term current use of insulin (HCC)  - METABOLIC PANEL, COMPREHENSIVE; Future  - HEMOGLOBIN A1C WITH EAG; Future  -  DIABETES FOOT EXAM    2. Essential hypertension  Controlled    3.  Encounter for Medicare annual wellness exam

## 2021-03-17 NOTE — PROGRESS NOTES
This is the Subsequent Medicare Annual Wellness Exam, performed 12 months or more after the Initial AWV or the last Subsequent AWV  Health Maintenance Due   Topic Date Due    Eye Exam Retinal or Dilated  Never done    COVID-19 Vaccine (1) Never done    Shingrix Vaccine Age 50> (1 of 2) Never done    GLAUCOMA SCREENING Q2Y  08/01/2018    Foot Exam Q1  08/07/2019    DTaP/Tdap/Td series (2 - Td) 09/13/2019    Medicare Yearly Exam  02/10/2021     Depression Risk Factor Screening:     3 most recent PHQ Screens 3/17/2021   Little interest or pleasure in doing things Not at all   Feeling down, depressed, irritable, or hopeless Not at all   Total Score PHQ 2 0     Abuse Screen  Patient is not abused  Fall Risk  Fall Risk Assessment, last 12 mths 3/17/2021   Able to walk? Yes   Fall in past 12 months? 0   Do you feel unsteady? 0   Are you worried about falling 0   Number of falls in past 12 months -   Fall with injury? -       Alcohol Risk Factor Screening:    reports current alcohol use. 2x/month 2 drinks    Functional Ability and Level of Safety:   Hearing Loss  Hearing is good.      Activities of Daily Living  The home contains: handrails  Patient does total self care     Cognitive Screening   Evaluation of Cognitive Function:  Has your family/caregiver stated any concerns about your memory: no     Patient Care Team   Patient Care Team:  Amina Escalante as PCP - General (Physician Assistant)  Roxana Graham PA-C as PCP - [facility] Empaneled Provider  Christiane Hubbard MD (Orthopedic Surgery)  Leanna Larry MD (Orthopedic Surgery)  Dr. Myesha Suarez (Ophthalmology)  Fozia Toure MD (Gynecology)  Rosario Campbell MD (Cardiology)    Assessment/Plan   Education and counseling provided:  Are appropriate based on today's review and evaluation    Extended Emergency Contact Information  Primary Emergency Contact: [address], [address] Phone: [phone]  Relation: Spouse    ACP on File    I have reviewed the patient's medical history in detail and updated the computerized patient record.      History     Past Medical History:   Diagnosis Date    Allergic rhinitis     Atrophic vaginitis     DM (diabetes mellitus) ([address]     HLD (hyperlipidemia)     Hypertension     Neuropathy     Osteoporosis     meds started 2007      Past Surgical History:   Procedure Laterality Date    HX COLONOSCOPY  2011     Current Outpatient Medications   Medication Sig Dispense Refill    triamterene-hydroCHLOROthiazide (MAXZIDE) 37.5-25 mg per tablet TAKE 1 TABLET EVERY DAY 90 Tab 1    atorvastatin (LIPITOR) 20 mg tablet TAKE 1 TABLET EVERY DAY 90 Tab 3    latanoprost (XALATAN) 0.005 % ophthalmic solution       glucose blood VI test strips (BLOOD GLUCOSE TEST) strip daily 1 Each 11     No Known Allergies  Family History   Problem Relation Age of Onset    Breast Cancer Mother 48        breast    Cancer Mother     Diabetes Brother     Diabetes Brother     Hypertension Brother     Dementia Maternal Grandmother      Social History     Tobacco Use    Smoking status: Never Smoker    Smokeless tobacco: Never Used   Substance Use Topics    Alcohol use: Not Currently     Patient Active Problem List   Diagnosis Code    Common migraine G43.009    GERD (gastroesophageal reflux disease) K21.9    Osteopenia M85.80    Atrophic vaginitis N95.2    Allergic rhinitis J30.9    Type 2 diabetes mellitus with diabetic polyneuropathy (HCC) E11.42    Essential hypertension I10    Pure hypercholesterolemia E78.00    Living will, counseling/discussion Z71.89

## 2021-06-11 RX ORDER — ATORVASTATIN CALCIUM 20 MG/1
TABLET, FILM COATED ORAL
Qty: 90 TABLET | Refills: 1 | Status: SHIPPED | OUTPATIENT
Start: 2021-06-11 | End: 2021-10-22

## 2021-08-09 RX ORDER — TRIAMTERENE/HYDROCHLOROTHIAZID 37.5-25 MG
TABLET ORAL
Qty: 90 TABLET | Refills: 1 | Status: SHIPPED | OUTPATIENT
Start: 2021-08-09 | End: 2021-12-20

## 2021-09-28 ENCOUNTER — OFFICE VISIT (OUTPATIENT)
Dept: INTERNAL MEDICINE CLINIC | Age: 71
End: 2021-09-28
Payer: MEDICARE

## 2021-09-28 VITALS
OXYGEN SATURATION: 99 % | SYSTOLIC BLOOD PRESSURE: 129 MMHG | WEIGHT: 134 LBS | TEMPERATURE: 98.2 F | BODY MASS INDEX: 23.74 KG/M2 | RESPIRATION RATE: 16 BRPM | HEART RATE: 74 BPM | DIASTOLIC BLOOD PRESSURE: 77 MMHG | HEIGHT: 63 IN

## 2021-09-28 DIAGNOSIS — J30.9 ALLERGIC RHINITIS, UNSPECIFIED SEASONALITY, UNSPECIFIED TRIGGER: ICD-10-CM

## 2021-09-28 DIAGNOSIS — I10 ESSENTIAL HYPERTENSION: ICD-10-CM

## 2021-09-28 DIAGNOSIS — E78.00 PURE HYPERCHOLESTEROLEMIA: ICD-10-CM

## 2021-09-28 DIAGNOSIS — Z78.0 POSTMENOPAUSAL: ICD-10-CM

## 2021-09-28 DIAGNOSIS — M85.80 OSTEOPENIA, UNSPECIFIED LOCATION: ICD-10-CM

## 2021-09-28 DIAGNOSIS — E11.42 TYPE 2 DIABETES MELLITUS WITH DIABETIC POLYNEUROPATHY, WITHOUT LONG-TERM CURRENT USE OF INSULIN (HCC): Primary | ICD-10-CM

## 2021-09-28 PROCEDURE — G8427 DOCREV CUR MEDS BY ELIG CLIN: HCPCS | Performed by: PHYSICIAN ASSISTANT

## 2021-09-28 PROCEDURE — G8399 PT W/DXA RESULTS DOCUMENT: HCPCS | Performed by: PHYSICIAN ASSISTANT

## 2021-09-28 PROCEDURE — 3288F FALL RISK ASSESSMENT DOCD: CPT | Performed by: PHYSICIAN ASSISTANT

## 2021-09-28 PROCEDURE — 1100F PTFALLS ASSESS-DOCD GE2>/YR: CPT | Performed by: PHYSICIAN ASSISTANT

## 2021-09-28 PROCEDURE — G8432 DEP SCR NOT DOC, RNG: HCPCS | Performed by: PHYSICIAN ASSISTANT

## 2021-09-28 PROCEDURE — G8420 CALC BMI NORM PARAMETERS: HCPCS | Performed by: PHYSICIAN ASSISTANT

## 2021-09-28 PROCEDURE — 3044F HG A1C LEVEL LT 7.0%: CPT | Performed by: PHYSICIAN ASSISTANT

## 2021-09-28 PROCEDURE — 1090F PRES/ABSN URINE INCON ASSESS: CPT | Performed by: PHYSICIAN ASSISTANT

## 2021-09-28 PROCEDURE — 99214 OFFICE O/P EST MOD 30 MIN: CPT | Performed by: PHYSICIAN ASSISTANT

## 2021-09-28 PROCEDURE — G8536 NO DOC ELDER MAL SCRN: HCPCS | Performed by: PHYSICIAN ASSISTANT

## 2021-09-28 PROCEDURE — 3017F COLORECTAL CA SCREEN DOC REV: CPT | Performed by: PHYSICIAN ASSISTANT

## 2021-09-28 PROCEDURE — 2022F DILAT RTA XM EVC RTNOPTHY: CPT | Performed by: PHYSICIAN ASSISTANT

## 2021-09-28 PROCEDURE — G9899 SCRN MAM PERF RSLTS DOC: HCPCS | Performed by: PHYSICIAN ASSISTANT

## 2021-09-28 PROCEDURE — G8752 SYS BP LESS 140: HCPCS | Performed by: PHYSICIAN ASSISTANT

## 2021-09-28 PROCEDURE — G8754 DIAS BP LESS 90: HCPCS | Performed by: PHYSICIAN ASSISTANT

## 2021-09-28 RX ORDER — LORATADINE 10 MG/1
10 TABLET ORAL
COMMUNITY
Start: 2021-09-28 | End: 2021-09-29

## 2021-09-28 NOTE — PROGRESS NOTES
1. Have you been to the ER, urgent care clinic since your last visit? Hospitalized since your last visit? No    2. Have you seen or consulted any other health care providers outside of the [address] [place] [address] since your last visit? Include any pap smears or colon screening.  No   Chief Complaint   Patient presents with    Diabetes     6 months follow up    Hypertension     6 months follow up

## 2021-09-28 NOTE — PROGRESS NOTES
Ilene Valle is a 69 y.o. female  Chief Complaint   Patient presents with    Diabetes     6 months follow up    Hypertension     6 months follow up      Visit Vitals  /77   Pulse 74   Temp 98.2 °F (36.8 °C) (Temporal)   Resp 16   Ht 5' 3\" (1.6 m)   Wt 134 lb (60.8 kg)   SpO2 99%   BMI 23.74 kg/m²      Health Maintenance Due   Topic Date Due    Eye Exam Retinal or Dilated  Never done    COVID-19 Vaccine (1) Never done    Shingrix Vaccine Age 50> (1 of 2) Never done    DTaP/Tdap/Td series (2 - Td or Tdap) 09/13/2019    MICROALBUMIN Q1  09/16/2021    Lipid Screen  09/16/2021     HPI  Headaches and minor dizziness when standing x the past few weeks. BP has been normal - no lows.  dx with pancreatic cancer in July. Seeing oncology regularly. Has good social support. DM II - controlled previously. Due for recheck. Lab Results   Component Value Date/Time    Hemoglobin A1c 6.1 (H) 03/17/2021 10:50 AM    Hemoglobin A1c 5.9 (H) 09/16/2020 10:08 AM    Hemoglobin A1c 6.1 (H) 09/16/2019 10:18 AM    Glucose 95 03/17/2021 10:50 AM    Microalbumin/Creat ratio (mg/g creat) 8 09/16/2020 10:08 AM    Microalbumin,urine random 1.37 09/16/2020 10:08 AM    LDL, calculated 71.4 09/16/2020 10:08 AM    Creatinine 1.01 03/17/2021 10:50 AM     Currently taking:   Key Antihyperglycemic Medications     Patient is on no antihyperglycemic meds. Wt Readings from Last 3 Encounters:   09/28/21 134 lb (60.8 kg)   03/17/21 136 lb 3.2 oz (61.8 kg)   12/02/20 138 lb 12.8 oz (63 kg)   Pt has been working on diet & exercise for weight loss.      HTN Follow up - BP controlled:  BP Readings from Last 3 Encounters:   09/28/21 129/77   03/17/21 128/70   12/02/20 137/80     Currently taking:   Key CAD CHF Meds             triamterene-hydroCHLOROthiazide (MAXZIDE) 37.5-25 mg per tablet (Taking) TAKE 1 TABLET EVERY DAY    atorvastatin (LIPITOR) 20 mg tablet (Taking) TAKE 1 TABLET EVERY DAY        Hx Osteopenia - due for repeat DEXA. ROS  Review of Systems   Constitutional: Positive for malaise/fatigue. Negative for fever. HENT: Positive for congestion and ear pain. Eyes: Positive for discharge. Respiratory: Negative for cough, sputum production and shortness of breath. Skin: Negative for rash. Neurological: Positive for dizziness and headaches. Endo/Heme/Allergies: Positive for environmental allergies. Psychiatric/Behavioral: Negative for depression. The patient is not nervous/anxious. EXAM  Physical Exam  Vitals and nursing note reviewed. Constitutional:       General: She is not in acute distress. Appearance: She is well-developed. HENT:      Head: Normocephalic and atraumatic. Ears:      Comments: B TMs with fluid. No erythema. Nose: Congestion and rhinorrhea present. Comments: Pale, inflamed nasal mucosa  Eyes:      Conjunctiva/sclera: Conjunctivae normal.   Cardiovascular:      Rate and Rhythm: Normal rate and regular rhythm. Heart sounds: Normal heart sounds. Pulmonary:      Effort: Pulmonary effort is normal.      Breath sounds: Normal breath sounds. Musculoskeletal:      Cervical back: Neck supple. Lymphadenopathy:      Cervical: No cervical adenopathy. Skin:     General: Skin is warm and dry. Neurological:      Mental Status: She is alert and oriented to person, place, and time. ASSESSMENT/PLAN  Encounter Diagnoses     ICD-10-CM ICD-9-CM   1. Type 2 diabetes mellitus with diabetic polyneuropathy, without long-term current use of insulin ([facility])  E11.42 250.60     357.2   2. Essential hypertension  I10 401.9   3. Pure hypercholesterolemia  E78.00 272.0   4. Allergic rhinitis, unspecified seasonality, unspecified trigger  J30.9 477.9   5. Osteopenia, unspecified location  M85.80 733.90   6.  Postmenopausal  Z78.0 V49.81     Orders Placed This Encounter    DEXA BONE DENSITY STUDY AXIAL    METABOLIC PANEL, COMPREHENSIVE    HEMOGLOBIN A1C WITH EAG    LIPID PANEL    MICROALBUMIN, UR, RAND W/ MICROALB/CREAT RATIO    Start loratadine (Claritin) 10 mg tablet

## 2021-10-22 RX ORDER — ATORVASTATIN CALCIUM 20 MG/1
TABLET, FILM COATED ORAL
Qty: 90 TABLET | Refills: 1 | Status: SHIPPED | OUTPATIENT
Start: 2021-10-22 | End: 2022-04-13

## 2021-11-02 ENCOUNTER — TELEPHONE (OUTPATIENT)
Dept: INTERNAL MEDICINE CLINIC | Age: 71
End: 2021-11-02

## 2021-11-02 NOTE — TELEPHONE ENCOUNTER
PT hasn't received a call to set up appointment for the bone density test ordered 09/28/2021. PT would like to know if Kallie Avila administers the shingles vax.

## 2021-11-18 ENCOUNTER — HOSPITAL ENCOUNTER (OUTPATIENT)
Dept: MAMMOGRAPHY | Age: 71
Discharge: HOME | End: 2021-11-18
Attending: PHYSICIAN ASSISTANT
Payer: MEDICARE

## 2021-11-18 DIAGNOSIS — Z78.0 POSTMENOPAUSAL: ICD-10-CM

## 2021-11-18 DIAGNOSIS — M85.80 OSTEOPENIA, UNSPECIFIED LOCATION: ICD-10-CM

## 2021-11-18 PROCEDURE — 77080 DXA BONE DENSITY AXIAL: CPT

## 2021-11-23 NOTE — PROGRESS NOTES
Your bone density scan shows that your bone density is stable. You still have Osteopenia (\"pre-osteoporosis\"). Please Take OTC Vitamin D3 5000 units once daily, OTC Calcium Citrate 500 mg twice daily, and increase weight bearing exercise. Recheck DEXA scan in 2 years.

## 2021-11-29 ENCOUNTER — TELEPHONE (OUTPATIENT)
Dept: INTERNAL MEDICINE CLINIC | Age: 71
End: 2021-11-29

## 2021-12-14 ENCOUNTER — TELEPHONE (OUTPATIENT)
Dept: INTERNAL MEDICINE CLINIC | Age: 71
End: 2021-12-14

## 2021-12-16 ENCOUNTER — TELEPHONE (OUTPATIENT)
Dept: INTERNAL MEDICINE CLINIC | Age: 71
End: 2021-12-16

## 2021-12-16 NOTE — TELEPHONE ENCOUNTER
[employer] only have 615 MG(315 C 250 D3) tablets(not all calcium- combo of D3 and Calcium citrate)    Patient wants to know is there a difference between calcium and calcium citrate. She cannot find calcium citrate OTC for 500mg. Currently patient is taking:  Calcium 500 mg - gummies  Vitamin D3 5000    Is this ok or should she find calcium citrate 500 mg? Patient is getting confused about the amount of actual calcium citrate she should get a day. Patient has to have refill order by 12/19/2021 otherwise, she will have to wait until next year for insurance purposes. Call home phone. This message isn't clear - is she talking about substituting calcium in her diet for the supplements? In total, pt needs to get at least 1,000 mg of Calcium via diet and/or supplements per day. I usually recommend that patients take OTC Calcium Citrate 500 mg twice daily unless they are very good about getting calcium in their diet.

## 2021-12-20 ENCOUNTER — TRANSCRIBE ORDER (OUTPATIENT)
Dept: SCHEDULING | Age: 71
End: 2021-12-20

## 2021-12-20 DIAGNOSIS — Z12.31 VISIT FOR SCREENING MAMMOGRAM: Primary | ICD-10-CM

## 2021-12-20 RX ORDER — TRIAMTERENE/HYDROCHLOROTHIAZID 37.5-25 MG
TABLET ORAL
Qty: 90 TABLET | Refills: 1 | Status: SHIPPED | OUTPATIENT
Start: 2021-12-20 | End: 2022-04-11

## 2022-02-15 ENCOUNTER — OFFICE VISIT (OUTPATIENT)
Dept: INTERNAL MEDICINE CLINIC | Age: 72
End: 2022-02-15
Payer: MEDICARE

## 2022-02-15 VITALS
HEIGHT: 63 IN | SYSTOLIC BLOOD PRESSURE: 123 MMHG | WEIGHT: 132.4 LBS | TEMPERATURE: 98.5 F | RESPIRATION RATE: 16 BRPM | HEART RATE: 92 BPM | OXYGEN SATURATION: 99 % | BODY MASS INDEX: 23.46 KG/M2 | DIASTOLIC BLOOD PRESSURE: 65 MMHG

## 2022-02-15 DIAGNOSIS — E11.42 TYPE 2 DIABETES MELLITUS WITH DIABETIC POLYNEUROPATHY, WITHOUT LONG-TERM CURRENT USE OF INSULIN (HCC): ICD-10-CM

## 2022-02-15 DIAGNOSIS — K59.09 CHRONIC CONSTIPATION: Primary | ICD-10-CM

## 2022-02-15 PROCEDURE — 99214 OFFICE O/P EST MOD 30 MIN: CPT | Performed by: PHYSICIAN ASSISTANT

## 2022-02-15 PROCEDURE — 1090F PRES/ABSN URINE INCON ASSESS: CPT | Performed by: PHYSICIAN ASSISTANT

## 2022-02-15 PROCEDURE — G9899 SCRN MAM PERF RSLTS DOC: HCPCS | Performed by: PHYSICIAN ASSISTANT

## 2022-02-15 PROCEDURE — 3046F HEMOGLOBIN A1C LEVEL >9.0%: CPT | Performed by: PHYSICIAN ASSISTANT

## 2022-02-15 PROCEDURE — G8432 DEP SCR NOT DOC, RNG: HCPCS | Performed by: PHYSICIAN ASSISTANT

## 2022-02-15 PROCEDURE — 2022F DILAT RTA XM EVC RTNOPTHY: CPT | Performed by: PHYSICIAN ASSISTANT

## 2022-02-15 PROCEDURE — G8427 DOCREV CUR MEDS BY ELIG CLIN: HCPCS | Performed by: PHYSICIAN ASSISTANT

## 2022-02-15 PROCEDURE — 3017F COLORECTAL CA SCREEN DOC REV: CPT | Performed by: PHYSICIAN ASSISTANT

## 2022-02-15 PROCEDURE — G8752 SYS BP LESS 140: HCPCS | Performed by: PHYSICIAN ASSISTANT

## 2022-02-15 PROCEDURE — G8754 DIAS BP LESS 90: HCPCS | Performed by: PHYSICIAN ASSISTANT

## 2022-02-15 PROCEDURE — G8399 PT W/DXA RESULTS DOCUMENT: HCPCS | Performed by: PHYSICIAN ASSISTANT

## 2022-02-15 PROCEDURE — G8536 NO DOC ELDER MAL SCRN: HCPCS | Performed by: PHYSICIAN ASSISTANT

## 2022-02-15 PROCEDURE — 1101F PT FALLS ASSESS-DOCD LE1/YR: CPT | Performed by: PHYSICIAN ASSISTANT

## 2022-02-15 PROCEDURE — G8420 CALC BMI NORM PARAMETERS: HCPCS | Performed by: PHYSICIAN ASSISTANT

## 2022-02-15 RX ORDER — DOCUSATE SODIUM 100 MG/1
100 CAPSULE, LIQUID FILLED ORAL
Qty: 180 CAPSULE | Refills: 1 | Status: SHIPPED | OUTPATIENT
Start: 2022-02-15 | End: 2022-03-03

## 2022-02-15 NOTE — PROGRESS NOTES
Mary Pike is a 66 y.o. female  Chief Complaint   Patient presents with    Abdominal Pain     irregular bowel movement     Visit Vitals  /65   Pulse 92   Temp 98.5 °F (36.9 °C) (Temporal)   Resp 16   Ht 5' 3\" (1.6 m)   Wt 132 lb 6.4 oz (60.1 kg)   SpO2 99%   BMI 23.45 kg/m²      Health Maintenance Due   Topic Date Due    Eye Exam Retinal or Dilated  Never done    Shingrix Vaccine Age 50> (1 of 2) Never done    DTaP/Tdap/Td series (2 - Td or Tdap) 09/13/2019    Colorectal Cancer Screening Combo  12/08/2021    Breast Cancer Screen Mammogram  01/11/2022     HPI  Feeling constipated. Used Enema and helped somewhat. Tried drinking saline and tried an OTC laxative (Polyethaline Glycol) orally that was not helpful    Pain 3-8 off and on     BM don't feel complete. Stools are hard until drinking laxative. Medium brown. Going for Colonoscopy 2/28. DM II - diet controlled   Lab Results   Component Value Date/Time    Hemoglobin A1c 6.1 (H) 09/28/2021 10:03 AM    Hemoglobin A1c 6.1 (H) 03/17/2021 10:50 AM    Hemoglobin A1c 5.9 (H) 09/16/2020 10:08 AM    Glucose 101 (H) 09/28/2021 10:03 AM    Microalbumin/Creat ratio (mg/g creat) 6 09/28/2021 10:03 AM    Microalbumin,urine random 0.76 09/28/2021 10:03 AM    LDL, calculated 63.4 09/28/2021 10:03 AM    Creatinine 0.98 09/28/2021 10:03 AM     Currently taking:   Key Antihyperglycemic Medications     Patient is on no antihyperglycemic meds. ROS  Review of Systems   Constitutional: Negative for fever. Respiratory: Negative for shortness of breath. Cardiovascular: Negative for chest pain and palpitations. Gastrointestinal: Positive for constipation. Negative for abdominal pain, blood in stool, diarrhea, heartburn, melena, nausea and vomiting. Neurological: Negative for dizziness, loss of consciousness and weakness. Psychiatric/Behavioral: Negative for depression. The patient is not nervous/anxious.       EXAM  Physical Exam  Vitals and nursing note reviewed. Constitutional:       General: She is not in acute distress. Appearance: She is well-developed. She is not diaphoretic. HENT:      Head: Normocephalic and atraumatic. Neck:      Vascular: No JVD. Cardiovascular:      Rate and Rhythm: Normal rate and regular rhythm. Heart sounds: Normal heart sounds. Pulmonary:      Effort: Pulmonary effort is normal. No respiratory distress. Breath sounds: Normal breath sounds. Abdominal:      General: Bowel sounds are normal. There is no distension. Palpations: Abdomen is soft. There is no mass. Tenderness: There is no abdominal tenderness. There is no guarding or rebound. Hernia: No hernia is present. Musculoskeletal:      Cervical back: Neck supple. Skin:     General: Skin is warm and dry. Neurological:      Mental Status: She is alert and oriented to person, place, and time. Psychiatric:         Behavior: Behavior normal.         Thought Content: Thought content normal.         Judgment: Judgment normal.       ASSESSMENT/PLAN  Encounter Diagnoses     ICD-10-CM ICD-9-CM   1. Chronic constipation  K59.09 564.00   2. Type 2 diabetes mellitus with diabetic polyneuropathy, without long-term current use of insulin ([facility])  E11.42 250.60     357.2     Orders Placed This Encounter    METABOLIC PANEL, COMPREHENSIVE    HEMOGLOBIN A1C WITH EAG    docusate sodium (COLACE) 100 mg capsule   Prunes in diet daily. Increase fiber slowly, and hydrate well daily.

## 2022-02-15 NOTE — PROGRESS NOTES
1. Have you been to the ER, urgent care clinic since your last visit? Hospitalized since your last visit? No    2. Have you seen or consulted any other health care providers outside of the [address] [place] [address] since your last visit? Include any pap smears or colon screening.  No   Chief Complaint   Patient presents with    Abdominal Pain     irregular bowel movement

## 2022-02-16 LAB
ALBUMIN SERPL-MCNC: 3.7 G/DL (ref 3.5–5)
ALBUMIN/GLOB SERPL: 1.2 {RATIO} (ref 1.1–2.2)
ALP SERPL-CCNC: 38 U/L (ref 45–117)
ALT SERPL-CCNC: 17 U/L (ref 12–78)
ANION GAP SERPL CALC-SCNC: 4 MMOL/L (ref 5–15)
AST SERPL-CCNC: 13 U/L (ref 15–37)
BILIRUB SERPL-MCNC: 0.3 MG/DL (ref 0.2–1)
BUN SERPL-MCNC: 19 MG/DL (ref 6–20)
BUN/CREAT SERPL: 21 (ref 12–20)
CALCIUM SERPL-MCNC: 10.1 MG/DL (ref 8.5–10.1)
CHLORIDE SERPL-SCNC: 108 MMOL/L (ref 97–108)
CO2 SERPL-SCNC: 28 MMOL/L (ref 21–32)
CREAT SERPL-MCNC: 0.92 MG/DL (ref 0.55–1.02)
EST. AVERAGE GLUCOSE BLD GHB EST-MCNC: 128 MG/DL
GLOBULIN SER CALC-MCNC: 3 G/DL (ref 2–4)
GLUCOSE SERPL-MCNC: 69 MG/DL (ref 65–100)
HBA1C MFR BLD: 6.1 % (ref 4–5.6)
POTASSIUM SERPL-SCNC: 3.9 MMOL/L (ref 3.5–5.1)
PROT SERPL-MCNC: 6.7 G/DL (ref 6.4–8.2)
SODIUM SERPL-SCNC: 140 MMOL/L (ref 136–145)

## 2022-02-16 NOTE — PROGRESS NOTES
Your diabetes remains controlled. Liver enzymes, kidney function, and electrolytes are all normal/acceptable. Good!

## 2022-02-22 ENCOUNTER — HOSPITAL ENCOUNTER (OUTPATIENT)
Dept: MAMMOGRAPHY | Age: 72
Discharge: HOME | End: 2022-02-22
Attending: PHYSICIAN ASSISTANT
Payer: MEDICARE

## 2022-02-22 DIAGNOSIS — Z12.31 VISIT FOR SCREENING MAMMOGRAM: ICD-10-CM

## 2022-02-22 PROCEDURE — 77067 SCR MAMMO BI INCL CAD: CPT

## 2022-02-24 ENCOUNTER — TRANSCRIBE ORDER (OUTPATIENT)
Dept: REGISTRATION | Age: 72
End: 2022-02-24

## 2022-02-24 ENCOUNTER — HOSPITAL ENCOUNTER (OUTPATIENT)
Dept: PREADMISSION TESTING | Age: 72
Discharge: HOME | End: 2022-02-24
Attending: INTERNAL MEDICINE
Payer: MEDICARE

## 2022-02-24 DIAGNOSIS — U07.1 COVID-19: Primary | ICD-10-CM

## 2022-02-24 DIAGNOSIS — U07.1 COVID-19: ICD-10-CM

## 2022-02-24 PROCEDURE — U0005 INFEC AGEN DETEC AMPLI PROBE: HCPCS

## 2022-02-25 ENCOUNTER — VIRTUAL VISIT (OUTPATIENT)
Dept: INTERNAL MEDICINE CLINIC | Age: 72
End: 2022-02-25
Payer: MEDICARE

## 2022-02-25 DIAGNOSIS — R09.82 PND (POST-NASAL DRIP): Primary | ICD-10-CM

## 2022-02-25 DIAGNOSIS — J02.9 SORE THROAT: ICD-10-CM

## 2022-02-25 DIAGNOSIS — R09.81 SINUS CONGESTION: ICD-10-CM

## 2022-02-25 LAB
SARS-COV-2, XPLCVT: NOT DETECTED
SOURCE, COVRS: NORMAL

## 2022-02-25 PROCEDURE — 99442 PR PHYS/QHP TELEPHONE EVALUATION 11-20 MIN: CPT | Performed by: PHYSICIAN ASSISTANT

## 2022-02-25 NOTE — PROGRESS NOTES
1. Have you been to the ER, urgent care clinic since your last visit? Hospitalized since your last visit? N    2. Have you seen or consulted any other health care providers outside of the [address] [place] [address] since your last visit? Include any pap smears or colon screening. No    Health Maintenance Due   Topic Date Due    Eye Exam Retinal or Dilated  Never done    Shingrix Vaccine Age 50> (1 of 2) Never done    DTaP/Tdap/Td series (2 - Td or Tdap) 09/13/2019    Colorectal Cancer Screening Combo  12/08/2021    Medicare Yearly Exam  03/18/2022     Patient here today with symptoms today, drainage, headache, head congestion, sore throat, for x2 days.

## 2022-02-25 NOTE — PROGRESS NOTES
Zoie Loya is a 66 y.o. female, evaluated via audio-only technology on 2/25/2022 for Concern For COVID-19 (Coronavirus)  . Assessment & Plan:   Diagnoses and all orders for this visit:    1. PND (post-nasal drip)    2. Sinus congestion    3. Sore throat    I explained the patient her symptoms are probably viral or allergy related. I like for the patient to  an antihistamine that has a decongestion. Also have advised patient to the office next week if her symptoms have changed. The patient with results of her COVID-19 is done at [facility].    12  Subjective:   Patient was not able to get the camera to work on her phone so had to do a telephone call. Patient reports 2 days ago she started with a sore throat, congestion and sinus drainage. She reports that she has not been experiencing cough or sneezing. The patient denies fever or chills. She did do a self test on the first day of her symptoms at home for COVID-19 which was negative. The patient reports that she is scheduled to have a colonoscopy next week that she had a Covid test done yesterday at [facility]. The patient reports that she has been taking Zyrtec for her symptoms. Prior to Admission medications    Medication Sig Start Date End Date Taking? Authorizing Provider   triamterene-hydroCHLOROthiazide (MAXZIDE) 37.5-25 mg per tablet TAKE 1 TABLET EVERY DAY 12/20/21  Yes Irvin Nova PA-C   atorvastatin (LIPITOR) 20 mg tablet TAKE 1 TABLET EVERY DAY 10/22/21  Yes Irvin Nova PA-C   latanoprost (XALATAN) 0.005 % ophthalmic solution  7/31/19  Yes Provider, Historical   docusate sodium (COLACE) 100 mg capsule Take 1 Capsule by mouth two (2) times daily as needed for Constipation for up to 90 days. Patient not taking: Reported on 2/25/2022 2/15/22 5/16/22  Irvin Nova PA-C   cetirizine (ZYRTEC) 10 mg tablet Take  by mouth.   Patient not taking: Reported on 2/25/2022    Provider, Historical     No Known Allergies    ROS  See above  No data recorded     Bobby Ballesteros, who was evaluated through a patient-initiated, synchronous (real-time) audio only encounter, and/or her healthcare decision maker, is aware that it is a billable service, which includes applicable co-pays, with coverage as determined by her insurance carrier. She provided verbal consent to proceed. She has not had a related appointment within my department in the past 7 days or scheduled within the next 24 hours. The patient was located at home in a state where the provider was licensed to provide care. On this date 02/25/2022 I have spent 15 minutes reviewing previous notes, test results and face to face (virtual) with the patient discussing the diagnosis and importance of compliance with the treatment plan as well as documenting on the day of the visit.     Luis Scott PA-C

## 2022-03-03 ENCOUNTER — VIRTUAL VISIT (OUTPATIENT)
Dept: INTERNAL MEDICINE CLINIC | Age: 72
End: 2022-03-03
Payer: MEDICARE

## 2022-03-03 DIAGNOSIS — J06.9 ACUTE URI: Primary | ICD-10-CM

## 2022-03-03 PROCEDURE — G9899 SCRN MAM PERF RSLTS DOC: HCPCS | Performed by: PHYSICIAN ASSISTANT

## 2022-03-03 PROCEDURE — G8427 DOCREV CUR MEDS BY ELIG CLIN: HCPCS | Performed by: PHYSICIAN ASSISTANT

## 2022-03-03 PROCEDURE — 99442 PR PHYS/QHP TELEPHONE EVALUATION 11-20 MIN: CPT | Performed by: PHYSICIAN ASSISTANT

## 2022-03-03 PROCEDURE — G8420 CALC BMI NORM PARAMETERS: HCPCS | Performed by: PHYSICIAN ASSISTANT

## 2022-03-03 PROCEDURE — 3017F COLORECTAL CA SCREEN DOC REV: CPT | Performed by: PHYSICIAN ASSISTANT

## 2022-03-03 PROCEDURE — G8536 NO DOC ELDER MAL SCRN: HCPCS | Performed by: PHYSICIAN ASSISTANT

## 2022-03-03 PROCEDURE — 1090F PRES/ABSN URINE INCON ASSESS: CPT | Performed by: PHYSICIAN ASSISTANT

## 2022-03-03 PROCEDURE — G8432 DEP SCR NOT DOC, RNG: HCPCS | Performed by: PHYSICIAN ASSISTANT

## 2022-03-03 PROCEDURE — G8399 PT W/DXA RESULTS DOCUMENT: HCPCS | Performed by: PHYSICIAN ASSISTANT

## 2022-03-03 PROCEDURE — 1101F PT FALLS ASSESS-DOCD LE1/YR: CPT | Performed by: PHYSICIAN ASSISTANT

## 2022-03-03 PROCEDURE — G8756 NO BP MEASURE DOC: HCPCS | Performed by: PHYSICIAN ASSISTANT

## 2022-03-03 RX ORDER — AZITHROMYCIN 250 MG/1
TABLET, FILM COATED ORAL
Qty: 6 TABLET | Refills: 0 | Status: SHIPPED | OUTPATIENT
Start: 2022-03-03 | End: 2022-04-11

## 2022-03-03 NOTE — PROGRESS NOTES
1. \"Have you been to the ER, urgent care clinic since your last visit? Hospitalized since your last visit? \" No    2. \"Have you seen or consulted any other health care providers outside of the [address] [place] [address] since your last visit? \" No    3. For patients aged 39-70: Has the patient had a colonoscopy / FIT/ Cologuard? No      If the patient is female:    4. For patients aged 41-77: Has the patient had a mammogram within the past 2 years? Yes, [facility]      5. For patients aged 21-65: Has the patient had a pap smear? Health Maintenance Due   Topic Date Due    Eye Exam Retinal or Dilated  Never done    Shingrix Vaccine Age 50> (1 of 2) Never done    DTaP/Tdap/Td series (2 - Td or Tdap) 09/13/2019    Colorectal Cancer Screening Combo  12/08/2021    Medicare Yearly Exam  03/18/2022     Patient having symptoms today, congestion, head cold, chest pain/congestion.

## 2022-03-03 NOTE — PROGRESS NOTES
Duong Schultz is a 66 y.o. female, evaluated via audio-only technology on 3/3/2022 for Concern For COVID-19 (Coronavirus) and Cold Symptoms  . Assessment & Plan:   Diagnoses and all orders for this visit:    1. Acute URI  -     azithromycin (ZITHROMAX) 250 mg tablet; Take two tablets today then take one tablet daily for 4 days. Azithromycin has been sent to the pharmacy. I have advised the patient to continue to take her Claritin-D for the next couple of days as well. Patient understands that she may reach out to the office that she feels her symptoms are not improving. 12  Subjective: The patient presents for evaluation of cold symptoms. She states since the last visit she did get the clariint D. She reports she thought she was getting better so she stopped the medication. Then on Monday she had to reschedule her colonoscopy because she started back with congestion and mild chest congestion. She denies fever or chills. She was covid tested last Friday and that was negative. Prior to Admission medications    Medication Sig Start Date End Date Taking? Authorizing Provider   azithromycin (ZITHROMAX) 250 mg tablet Take two tablets today then take one tablet daily for 4 days. 3/3/22  Yes Olinda Scott PA-C   triamterene-hydroCHLOROthiazide (MAXZIDE) 37.5-25 mg per tablet TAKE 1 TABLET EVERY DAY 12/20/21  Yes Tamra Okeefe PA-C   atorvastatin (LIPITOR) 20 mg tablet TAKE 1 TABLET EVERY DAY 10/22/21  Yes Tamra Okeefe PA-C   latanoprost (XALATAN) 0.005 % ophthalmic solution  7/31/19  Yes Provider, Historical   docusate sodium (COLACE) 100 mg capsule Take 1 Capsule by mouth two (2) times daily as needed for Constipation for up to 90 days. Patient not taking: Reported on 2/25/2022 2/15/22 3/3/22  Tamra Okeefe PA-C   cetirizine (ZYRTEC) 10 mg tablet Take  by mouth.   3/3/22  Provider, Historical     No Known Allergies    ROS  See above  No data recorded     Duong Schultz, who was evaluated through a patient-initiated, synchronous (real-time) audio only encounter, and/or her healthcare decision maker, is aware that it is a billable service, which includes applicable co-pays, with coverage as determined by her insurance carrier. She provided verbal consent to proceed. She has not had a related appointment within my department in the past 7 days or scheduled within the next 24 hours. The patient was located at home in a state where the provider was licensed to provide care. On this date 03/03/2022 I have spent 15 minutes reviewing previous notes, test results and face to face (virtual) with the patient discussing the diagnosis and importance of compliance with the treatment plan as well as documenting on the day of the visit.     Migel Scott PA-C

## 2022-03-18 PROBLEM — I10 ESSENTIAL HYPERTENSION: Status: ACTIVE | Noted: 2017-01-16

## 2022-03-19 PROBLEM — E78.00 PURE HYPERCHOLESTEROLEMIA: Status: ACTIVE | Noted: 2017-01-16

## 2022-03-20 PROBLEM — Z71.89 LIVING WILL, COUNSELING/DISCUSSION: Status: ACTIVE | Noted: 2017-07-13

## 2022-04-11 RX ORDER — DOCUSATE SODIUM 100 MG/1
100 CAPSULE, LIQUID FILLED ORAL
COMMUNITY

## 2022-04-11 RX ORDER — TRIAMTERENE/HYDROCHLOROTHIAZID 37.5-25 MG
1 TABLET ORAL
COMMUNITY

## 2022-04-13 RX ORDER — ATORVASTATIN CALCIUM 20 MG/1
TABLET, FILM COATED ORAL
Qty: 90 TABLET | Refills: 1 | Status: SHIPPED | OUTPATIENT
Start: 2022-04-13 | End: 2022-10-11

## 2022-04-18 ENCOUNTER — HOSPITAL ENCOUNTER (OUTPATIENT)
Age: 72
Setting detail: OUTPATIENT SURGERY
Discharge: HOME | End: 2022-04-18
Attending: INTERNAL MEDICINE | Admitting: INTERNAL MEDICINE
Payer: MEDICARE

## 2022-04-18 ENCOUNTER — ANESTHESIA (OUTPATIENT)
Dept: ENDOSCOPY | Age: 72
End: 2022-04-18
Payer: MEDICARE

## 2022-04-18 ENCOUNTER — ANESTHESIA EVENT (OUTPATIENT)
Dept: ENDOSCOPY | Age: 72
End: 2022-04-18
Payer: MEDICARE

## 2022-04-18 VITALS
HEIGHT: 63 IN | RESPIRATION RATE: 13 BRPM | TEMPERATURE: 98 F | WEIGHT: 132.28 LBS | DIASTOLIC BLOOD PRESSURE: 75 MMHG | OXYGEN SATURATION: 98 % | HEART RATE: 62 BPM | SYSTOLIC BLOOD PRESSURE: 121 MMHG | BODY MASS INDEX: 23.44 KG/M2

## 2022-04-18 PROCEDURE — 74011250636 HC RX REV CODE- 250/636: Performed by: NURSE ANESTHETIST, CERTIFIED REGISTERED

## 2022-04-18 PROCEDURE — 2709999900 HC NON-CHARGEABLE SUPPLY: Performed by: INTERNAL MEDICINE

## 2022-04-18 PROCEDURE — 76060000031 HC ANESTHESIA FIRST 0.5 HR: Performed by: INTERNAL MEDICINE

## 2022-04-18 PROCEDURE — 74011000250 HC RX REV CODE- 250: Performed by: NURSE ANESTHETIST, CERTIFIED REGISTERED

## 2022-04-18 PROCEDURE — 76040000019: Performed by: INTERNAL MEDICINE

## 2022-04-18 RX ORDER — FLUMAZENIL 0.1 MG/ML
0.2 INJECTION INTRAVENOUS
Status: DISCONTINUED | OUTPATIENT
Start: 2022-04-18 | End: 2022-04-18 | Stop reason: HOSPADM

## 2022-04-18 RX ORDER — SODIUM CHLORIDE 0.9 % (FLUSH) 0.9 %
5-40 SYRINGE (ML) INJECTION EVERY 8 HOURS
Status: DISCONTINUED | OUTPATIENT
Start: 2022-04-18 | End: 2022-04-18 | Stop reason: HOSPADM

## 2022-04-18 RX ORDER — FENTANYL CITRATE 50 UG/ML
25-200 INJECTION, SOLUTION INTRAMUSCULAR; INTRAVENOUS
Status: DISCONTINUED | OUTPATIENT
Start: 2022-04-18 | End: 2022-04-18 | Stop reason: HOSPADM

## 2022-04-18 RX ORDER — EPINEPHRINE 0.1 MG/ML
1 INJECTION INTRACARDIAC; INTRAVENOUS
Status: DISCONTINUED | OUTPATIENT
Start: 2022-04-18 | End: 2022-04-18 | Stop reason: HOSPADM

## 2022-04-18 RX ORDER — SODIUM CHLORIDE 0.9 % (FLUSH) 0.9 %
5-40 SYRINGE (ML) INJECTION AS NEEDED
Status: DISCONTINUED | OUTPATIENT
Start: 2022-04-18 | End: 2022-04-18 | Stop reason: HOSPADM

## 2022-04-18 RX ORDER — SODIUM CHLORIDE 9 MG/ML
50 INJECTION, SOLUTION INTRAVENOUS CONTINUOUS
Status: DISCONTINUED | OUTPATIENT
Start: 2022-04-18 | End: 2022-04-18 | Stop reason: HOSPADM

## 2022-04-18 RX ORDER — PROPOFOL 10 MG/ML
INJECTION, EMULSION INTRAVENOUS AS NEEDED
Status: DISCONTINUED | OUTPATIENT
Start: 2022-04-18 | End: 2022-04-18 | Stop reason: HOSPADM

## 2022-04-18 RX ORDER — NALOXONE HYDROCHLORIDE 0.4 MG/ML
0.4 INJECTION, SOLUTION INTRAMUSCULAR; INTRAVENOUS; SUBCUTANEOUS
Status: DISCONTINUED | OUTPATIENT
Start: 2022-04-18 | End: 2022-04-18 | Stop reason: HOSPADM

## 2022-04-18 RX ORDER — MIDAZOLAM HYDROCHLORIDE 1 MG/ML
.25-5 INJECTION, SOLUTION INTRAMUSCULAR; INTRAVENOUS
Status: DISCONTINUED | OUTPATIENT
Start: 2022-04-18 | End: 2022-04-18 | Stop reason: HOSPADM

## 2022-04-18 RX ORDER — ATROPINE SULFATE 0.1 MG/ML
0.5 INJECTION INTRAVENOUS
Status: DISCONTINUED | OUTPATIENT
Start: 2022-04-18 | End: 2022-04-18 | Stop reason: HOSPADM

## 2022-04-18 RX ORDER — DEXTROMETHORPHAN/PSEUDOEPHED 2.5-7.5/.8
1.2 DROPS ORAL
Status: DISCONTINUED | OUTPATIENT
Start: 2022-04-18 | End: 2022-04-18 | Stop reason: HOSPADM

## 2022-04-18 RX ORDER — LIDOCAINE HYDROCHLORIDE 20 MG/ML
INJECTION, SOLUTION EPIDURAL; INFILTRATION; INTRACAUDAL; PERINEURAL AS NEEDED
Status: DISCONTINUED | OUTPATIENT
Start: 2022-04-18 | End: 2022-04-18 | Stop reason: HOSPADM

## 2022-04-18 RX ADMIN — PROPOFOL 30 MG: 10 INJECTION, EMULSION INTRAVENOUS at 10:31

## 2022-04-18 RX ADMIN — PROPOFOL 30 MG: 10 INJECTION, EMULSION INTRAVENOUS at 10:39

## 2022-04-18 RX ADMIN — PROPOFOL 30 MG: 10 INJECTION, EMULSION INTRAVENOUS at 10:32

## 2022-04-18 RX ADMIN — LIDOCAINE HYDROCHLORIDE 40 MG: 20 INJECTION, SOLUTION EPIDURAL; INFILTRATION; INTRACAUDAL; PERINEURAL at 10:31

## 2022-04-18 RX ADMIN — PROPOFOL 30 MG: 10 INJECTION, EMULSION INTRAVENOUS at 10:36

## 2022-04-18 RX ADMIN — PROPOFOL 30 MG: 10 INJECTION, EMULSION INTRAVENOUS at 10:34

## 2022-04-18 NOTE — H&P
[address]      History and Physical       NAME:  Miguelina Denny   :   1950   MRN:   [id number]             History of Present Illness:  Patient is a 66 y.o. who is seen for screening colonoscopy . PMH:  Past Medical History:   Diagnosis Date    Allergic rhinitis     Atrophic vaginitis     DM (diabetes mellitus) ([address]     diet and exercise control    HLD (hyperlipidemia)     Hypertension     Neuropathy     Osteoporosis     meds started        PSH:  Past Surgical History:   Procedure Laterality Date    HX COLONOSCOPY         Allergies:  No Known Allergies    Home Medications:  Prior to Admission Medications   Prescriptions Last Dose Informant Patient Reported? Taking? POLYETHYLENE GLYCOL 3350 PO 2022 at Unknown time  Yes Yes   Sig: Take 17 g by mouth daily. atorvastatin (LIPITOR) 20 mg tablet 2022 at Unknown time  No Yes   Sig: TAKE 1 TABLET EVERY DAY   cholecalciferol, vitamin D3, (VITAMIN D3 PO) 2022 at Unknown time  Yes Yes   Sig: Take 5,000 mcg by mouth daily. docusate sodium (COLACE) 100 mg capsule 2022 at Unknown time  Yes Yes   Sig: Take 100 mg by mouth two (2) times daily as needed for Constipation. latanoprost (XALATAN) 0.005 % ophthalmic solution 2022 at Unknown time  Yes Yes   Sig: Administer 1 Drop to both eyes nightly. triamterene-hydroCHLOROthiazide (MAXZIDE) 37.5-25 mg per tablet 2022 at Unknown time  Yes Yes   Sig: Take 1 Tablet by mouth nightly.       Facility-Administered Medications: None       Hospital Medications:  Current Facility-Administered Medications   Medication Dose Route Frequency    0.9% sodium chloride infusion  50 mL/hr IntraVENous CONTINUOUS    sodium chloride (NS) flush 5-40 mL  5-40 mL IntraVENous Q8H    sodium chloride (NS) flush 5-40 mL  5-40 mL IntraVENous PRN    midazolam (VERSED) injection 0.25-5 mg  0.25-5 mg IntraVENous Multiple    fentaNYL citrate (PF) injection  mcg   mcg IntraVENous Multiple    naloxone (NARCAN) injection 0.4 mg  0.4 mg IntraVENous Multiple    flumazeniL (ROMAZICON) 0.1 mg/mL injection 0.2 mg  0.2 mg IntraVENous Multiple    simethicone (MYLICON) 40ZL/0.1ZW oral drops 80 mg  1.2 mL Oral Multiple    atropine injection 0.5 mg  0.5 mg IntraVENous ONCE PRN    EPINEPHrine (ADRENALIN) 0.1 mg/mL syringe 1 mg  1 mg Endoscopically ONCE PRN       Social History:  Social History     Tobacco Use    Smoking status: Never Smoker    Smokeless tobacco: Never Used   Substance Use Topics    Alcohol use: Yes     Comment: 2x/month 2 drinks. Family History:  Family History   Problem Relation Age of Onset    Breast Cancer Mother 48        breast    Cancer Mother     Diabetes Brother     Hypertension Brother     Diabetes Brother     Dementia Maternal Grandmother          The patient was counseled at length about the risks of linda Covid-19 in the danielle-operative and post-operative states including the recovery window of their procedure. The patient was made aware that linda Covid-19 after a surgical procedure may worsen their prognosis for recovering from the virus and lend to a higher morbidity and or mortality risk. The patient was given the options of postponing their procedure. All of the risks, benefits, and alternatives were discussed. The patient does  wish to proceed with the procedure.     Review of Systems:      Constitutional: negative fever, negative chills, negative weight loss  Eyes:   negative visual changes  ENT:   negative sore throat, tongue or lip swelling  Respiratory:  negative cough, negative dyspnea  Cards:  negative for chest pain, palpitations, lower extremity edema  GI:   See HPI  :  negative for frequency, dysuria  Integument:  negative for rash and pruritus  Heme:  negative for easy bruising and gum/nose bleeding  Musculoskel: negative for myalgias,  back pain and muscle weakness  Neuro: negative for headaches, dizziness, vertigo  Psych:  negative for feelings of anxiety, depression       Objective:     Patient Vitals for the past 8 hrs:   BP Temp Pulse Resp SpO2 Height Weight   04/18/22 0939 117/72 98.2 °F (36.8 °C) 61 15 98 % 5' 3\" (1.6 m) 60 kg (132 lb 4.4 oz)     No intake/output data recorded. No intake/output data recorded. EXAM:     NEURO-a&o   HEENT-wnl   LUNGS-clear    COR-regular rate and rhythym     ABD-soft , no tenderness, no rebound, good bs     EXT-no edema     Data Review     No results for input(s): WBC, HGB, HCT, PLT, HGBEXT, HCTEXT, PLTEXT in the last 72 hours. No results for input(s): NA, K, CL, CO2, BUN, CREA, GLU, PHOS, CA in the last 72 hours. No results for input(s): AP, TBIL, TP, ALB, GLOB, GGT, AML, LPSE in the last 72 hours. No lab exists for component: SGOT, GPT, AMYP, HLPSE  No results for input(s): INR, PTP, APTT, INREXT in the last 72 hours.        Assessment:     · Screening colonoscopy      Patient Active Problem List   Diagnosis Code    Common migraine G43.009    GERD (gastroesophageal reflux disease) K21.9    Osteopenia M85.80    Atrophic vaginitis N95.2    Allergic rhinitis J30.9    Type 2 diabetes mellitus with diabetic polyneuropathy ([facility]) E11.42    Essential hypertension I10    Pure hypercholesterolemia E78.00    Living will, counseling/discussion Z71.89           Plan:   ·   · Endoscopic procedure with sedation     Signed By: Rebecca Mccain MD     4/18/2022  10:26 AM

## 2022-04-18 NOTE — ANESTHESIA PREPROCEDURE EVALUATION
Relevant Problems   No relevant active problems       Anesthetic History   No history of anesthetic complications            Review of Systems / Medical History  Patient summary reviewed, nursing notes reviewed and pertinent labs reviewed    Pulmonary  Within defined limits                 Neuro/Psych   Within defined limits           Cardiovascular    Hypertension: well controlled              Exercise tolerance: >4 METS     GI/Hepatic/Renal     GERD           Endo/Other    Diabetes         Other Findings              Physical Exam    Airway  Mallampati: I  TM Distance: > 6 cm  Neck ROM: normal range of motion   Mouth opening: Normal     Cardiovascular    Rhythm: regular           Dental  No notable dental hx       Pulmonary  Breath sounds clear to auscultation               Abdominal         Other Findings            Anesthetic Plan    ASA: 2  Anesthesia type: MAC          Induction: Intravenous  Anesthetic plan and risks discussed with: Patient

## 2022-04-18 NOTE — PROCEDURES
[address]  [address] [facility], [address]      Colonoscopy Operative Report    Josr Hodges  [id number]  1950      Procedure Type:   Colonoscopy --diagnostic     Indications:    Screening colonoscopy       Pre-operative Diagnosis: see indication above    Post-operative Diagnosis:  See findings below    :  Maxine Guerrero MD    Surgical Assistant: Endoscopy Technician-1: Naz Telles RN-1: Jose Espinal RN    Implants:  None    Referring Provider: Amber Winkler MD      Sedation:  MAC anesthesia Propofol      Procedure Details:  After informed consent was obtained with all risks and benefits of procedure explained and preoperative exam completed, the patient was taken to the endoscopy suite and placed in the left lateral decubitus position. Upon sequential sedation as per above, a digital rectal exam was performed demonstrating internal hemorrhoids. The Olympus videocolonoscope  was inserted in the rectum and carefully advanced to the cecum, which was identified by the ileocecal valve and appendiceal orifice. The cecum was identified by the ileocecal valve and appendiceal orifice. The quality of preparation was good. The colonoscope was slowly withdrawn with careful evaluation between folds. Retroflexion in the rectum was completed . Findings:   Rectum: normal  Sigmoid: normal  Descending Colon: normal  Transverse Colon: normal  Ascending Colon: normal  Cecum: normal    Specimen Removed:  none    Complications: None. EBL:  None. Impression:    normal colonic mucosa throughout    Recommendations: --For colon cancer screening in this average-risk patient, colonoscopy may be repeated in 10 years.         Signed By: Maxine Guerrero MD     4/18/2022  10:44 AM

## 2022-04-18 NOTE — DISCHARGE INSTRUCTIONS
[address], [address] [place] [address]    COLON DISCHARGE INSTRUCTIONS    Latanya Gupta  [id number]  1950    Discomfort:  Redness at IV site- apply warm compress to area; if redness or soreness persist- contact your physician  There may be a slight amount of blood passed from the rectum  Gaseous discomfort- walking, belching will help relieve any discomfort  You may not operate a vehicle for 12 hours  You may not engage in an occupation involving machinery or appliances for rest of today  You may not drink alcoholic beverages for at least 12 hours  Avoid making any critical decisions for at least 24 hour  DIET:  You may resume your regular diet - however -  remember your colon is empty and a heavy meal will produce gas. Avoid these foods:  vegetables, fried / greasy foods, carbonated drinks     ACTIVITY:  You may  resume your normal daily activities it is recommended that you spend the remainder of the day resting -  avoid any strenuous activity. CALL M.D. ANY SIGN OF:   Increasing pain, nausea, vomiting  Abdominal distension (swelling)  New increased bleeding (oral or rectal)  Fever (chills)  Pain in chest area  Bloody discharge from nose or mouth  Shortness of breath      Follow-up Instructions:   Call Dr. Cecile Clark for any questions or problems at [phone]          ENDOSCOPY FINDINGS:   Your colonoscopy was normal.  Telephone # [phone]      Signed By: Cecile Clark MD     4/18/2022  10:46 AM       DISCHARGE SUMMARY from Nurse    The following personal items collected during your admission are returned to you:   Dental Appliance: Dental Appliances: None  Vision: Visual Aid: Glasses  Hearing Aid:    Jewelry:    Clothing:    Other Valuables:    Valuables sent to safe:        Learning About Coronavirus (COVID-19)  Coronavirus (COVID-19): Overview  What is coronavirus ([id number])? The coronavirus disease (COVID-19) is caused by a virus.  It is an illness that was first found in [place], [place], in December 2019. It has since spread worldwide. The virus can cause fever, cough, and trouble breathing. In severe cases, it can cause pneumonia and make it hard to breathe without help. It can cause death. Coronaviruses are a large group of viruses. They cause the common cold. They also cause more serious illnesses like Middle East respiratory syndrome (MERS) and severe acute respiratory syndrome (SARS). COVID-19 is caused by a novel coronavirus. That means it's a new type that has not been seen in people before. This virus spreads person-to-person through droplets from coughing and sneezing. It can also spread when you are close to someone who is infected. And it can spread when you touch something that has the virus on it, such as a doorknob or a tabletop. What can you do to protect yourself from coronavirus (COVID-19)? The best way to protect yourself from getting sick is to:  · Avoid areas where there is an outbreak. · Avoid contact with people who may be infected. · Wash your hands often with soap or alcohol-based hand sanitizers. · Avoid crowds and try to stay at least 6 feet away from other people. · Wash your hands often, especially after you cough or sneeze. Use soap and water, and scrub for at least 20 seconds. If soap and water aren't available, use an alcohol-based hand . · Avoid touching your mouth, nose, and eyes. What can you do to avoid spreading the virus to others? To help avoid spreading the virus to others:  · Cover your mouth with a tissue when you cough or sneeze. Then throw the tissue in the trash. · Use a disinfectant to clean things that you touch often. · Stay home if you are sick or have been exposed to the virus. Don't go to school, work, or public areas. And don't use public transportation. · If you are sick:  ? Leave your home only if you need to get medical care.  But call the doctor's office first so they know you're coming. And wear a face mask, if you have one.  ? If you have a face mask, wear it whenever you're around other people. It can help stop the spread of the virus when you cough or sneeze. ? Clean and disinfect your home every day. Use household  and disinfectant wipes or sprays. Take special care to clean things that you grab with your hands. These include doorknobs, remote controls, phones, and handles on your refrigerator and microwave. And don't forget countertops, tabletops, bathrooms, and computer keyboards. When to call for help  Call 911 anytime you think you may need emergency care. For example, call if:  · You have severe trouble breathing. (You can't talk at all.)  · You have constant chest pain or pressure. · You are severely dizzy or lightheaded. · You are confused or can't think clearly. · Your face and lips have a blue color. · You pass out (lose consciousness) or are very hard to wake up. Call your doctor now if you develop symptoms such as:  · Shortness of breath. · Fever. · Cough. If you need to get care, call ahead to the doctor's office for instructions before you go. Make sure you wear a face mask, if you have one, to prevent exposing other people to the virus. Where can you get the latest information? The following health organizations are tracking and studying this virus. Their websites contain the most up-to-date information. [place] also learn what to do if you think you may have been exposed to the virus. · U.S. Centers for Disease Control and Prevention (CDC): The CDC provides updated news about the disease and travel advice. The website also tells you how to prevent the spread of infection. www.cdc.gov  · World Health Organization [facility]): WHO offers information about the virus outbreaks. WHO also has travel advice. www.who.int  Current as of: April 1, 2020               Content Version: 12.4  © 5384-1184 [employer].    Care instructions adapted under license by your healthcare professional. If you have questions about a medical condition or this instruction, always ask your healthcare professional. [place] [address] any warranty or liability for your use of this information.

## 2022-04-25 NOTE — ANESTHESIA POSTPROCEDURE EVALUATION
Post-Anesthesia Evaluation and Assessment    Patient: Fletcher Cross MRN: [id number]  SSN: [id number]    YOB: 1950  Age: 66 y.o. Sex: female      I have evaluated the patient and they are stable and ready for discharge from the PACU. Cardiovascular Function/Vital Signs  Visit Vitals  /75   Pulse 62   Temp 36.7 °C (98 °F)   Resp 13   Ht 5' 3\" (1.6 m)   Wt 60 kg (132 lb 4.4 oz)   SpO2 98%   BMI 23.43 kg/m²       Patient is status post MAC anesthesia for Procedure(s):  COLONOSCOPY. Nausea/Vomiting: None    Postoperative hydration reviewed and adequate. Pain:  Pain Scale 1: Numeric (0 - 10) (04/18/22 1105)  Pain Intensity 1: 0 (04/18/22 1105)   Managed    Neurological Status: At baseline    Mental Status, Level of Consciousness: Alert and  oriented to person, place, and time    Pulmonary Status:   O2 Device: None (Room air) (04/18/22 1105)   Adequate oxygenation and airway patent    Complications related to anesthesia: None    Post-anesthesia assessment completed. No concerns    Signed By: Lucien Go MD     April 25, 2022              Procedure(s):  COLONOSCOPY.     MAC    <BSHSIANPOST>    INITIAL Post-op Vital signs:   Vitals Value Taken Time   /75 04/18/22 1105   Temp 36.7 °C (98 °F) 04/18/22 1047   Pulse 62 04/18/22 1105   Resp 13 04/18/22 1105   SpO2 98 % 04/18/22 1105

## 2022-06-15 ENCOUNTER — OFFICE VISIT (OUTPATIENT)
Dept: INTERNAL MEDICINE CLINIC | Age: 72
End: 2022-06-15
Payer: MEDICARE

## 2022-06-15 VITALS
HEART RATE: 72 BPM | BODY MASS INDEX: 23.74 KG/M2 | OXYGEN SATURATION: 100 % | DIASTOLIC BLOOD PRESSURE: 80 MMHG | WEIGHT: 134 LBS | RESPIRATION RATE: 20 BRPM | HEIGHT: 63 IN | TEMPERATURE: 97.6 F | SYSTOLIC BLOOD PRESSURE: 130 MMHG

## 2022-06-15 DIAGNOSIS — E11.42 TYPE 2 DIABETES MELLITUS WITH DIABETIC POLYNEUROPATHY, WITHOUT LONG-TERM CURRENT USE OF INSULIN (HCC): ICD-10-CM

## 2022-06-15 DIAGNOSIS — E78.00 PURE HYPERCHOLESTEROLEMIA: ICD-10-CM

## 2022-06-15 DIAGNOSIS — I10 ESSENTIAL HYPERTENSION: ICD-10-CM

## 2022-06-15 DIAGNOSIS — E55.9 VITAMIN D DEFICIENCY: ICD-10-CM

## 2022-06-15 DIAGNOSIS — N76.0 ACUTE VAGINITIS: ICD-10-CM

## 2022-06-15 DIAGNOSIS — Z00.00 ENCOUNTER FOR MEDICARE ANNUAL WELLNESS EXAM: Primary | ICD-10-CM

## 2022-06-15 PROCEDURE — 3017F COLORECTAL CA SCREEN DOC REV: CPT | Performed by: PHYSICIAN ASSISTANT

## 2022-06-15 PROCEDURE — G8427 DOCREV CUR MEDS BY ELIG CLIN: HCPCS | Performed by: PHYSICIAN ASSISTANT

## 2022-06-15 PROCEDURE — 3044F HG A1C LEVEL LT 7.0%: CPT | Performed by: PHYSICIAN ASSISTANT

## 2022-06-15 PROCEDURE — 2022F DILAT RTA XM EVC RTNOPTHY: CPT | Performed by: PHYSICIAN ASSISTANT

## 2022-06-15 PROCEDURE — G8536 NO DOC ELDER MAL SCRN: HCPCS | Performed by: PHYSICIAN ASSISTANT

## 2022-06-15 PROCEDURE — G9899 SCRN MAM PERF RSLTS DOC: HCPCS | Performed by: PHYSICIAN ASSISTANT

## 2022-06-15 PROCEDURE — G8420 CALC BMI NORM PARAMETERS: HCPCS | Performed by: PHYSICIAN ASSISTANT

## 2022-06-15 PROCEDURE — G8752 SYS BP LESS 140: HCPCS | Performed by: PHYSICIAN ASSISTANT

## 2022-06-15 PROCEDURE — 1101F PT FALLS ASSESS-DOCD LE1/YR: CPT | Performed by: PHYSICIAN ASSISTANT

## 2022-06-15 PROCEDURE — 99214 OFFICE O/P EST MOD 30 MIN: CPT | Performed by: PHYSICIAN ASSISTANT

## 2022-06-15 PROCEDURE — G8432 DEP SCR NOT DOC, RNG: HCPCS | Performed by: PHYSICIAN ASSISTANT

## 2022-06-15 PROCEDURE — G8754 DIAS BP LESS 90: HCPCS | Performed by: PHYSICIAN ASSISTANT

## 2022-06-15 PROCEDURE — G0439 PPPS, SUBSEQ VISIT: HCPCS | Performed by: PHYSICIAN ASSISTANT

## 2022-06-15 PROCEDURE — G8399 PT W/DXA RESULTS DOCUMENT: HCPCS | Performed by: PHYSICIAN ASSISTANT

## 2022-06-15 PROCEDURE — 1090F PRES/ABSN URINE INCON ASSESS: CPT | Performed by: PHYSICIAN ASSISTANT

## 2022-06-15 RX ORDER — FLUCONAZOLE 150 MG/1
150 TABLET ORAL
Qty: 2 TABLET | Refills: 0 | Status: SHIPPED | OUTPATIENT
Start: 2022-06-15 | End: 2022-06-23

## 2022-06-15 NOTE — PROGRESS NOTES
Room:     Identified pt with two pt identifiers(name and ). Reviewed record in preparation for visit and have obtained necessary documentation. All patient medications has been reviewed.     Chief Complaint   Patient presents with    Yeast Infection       Health Maintenance Due   Topic    Eye Exam Retinal or Dilated     Shingrix Vaccine Age 50> (1 of 2)    DTaP/Tdap/Td series (2 - Td or Tdap)    Medicare Yearly Exam        Vitals:    06/15/22 1451   BP: (!) 152/79   Pulse: 72   Resp: 20   Temp: 97.6 °F (36.4 °C)   SpO2: 100%   Weight: 134 lb (60.8 kg)   Height: 5' 3\" (1.6 m)   PainSc:   0 - No pain

## 2022-06-15 NOTE — PROGRESS NOTES
This is the Subsequent Medicare Annual Wellness Exam, performed 12 months or more after the Initial AWV or the last Subsequent AWV  Health Maintenance Due   Topic Date Due    Eye Exam Retinal or Dilated  Never done    Shingrix Vaccine Age 50> (1 of 2) Never done    DTaP/Tdap/Td series (2 - Td or Tdap) 09/13/2019    Medicare Yearly Exam  03/18/2022     Depression Risk Factor Screening:     3 most recent PHQ Screens 6/15/2022   Little interest or pleasure in doing things Not at all   Feeling down, depressed, irritable, or hopeless Not at all   Total Score PHQ 2 0   Trouble falling or staying asleep, or sleeping too much -   Feeling tired or having little energy -   Poor appetite, weight loss, or overeating -   Feeling bad about yourself - or that you are a failure or have let yourself or your family down -   Trouble concentrating on things such as school, work, reading, or watching TV -   Moving or speaking so slowly that other people could have noticed; or the opposite being so fidgety that others notice -   Thoughts of being better off dead, or hurting yourself in some way -   PHQ 9 Score -       Abuse Screen  Patient is not abused    Fall Risk  Fall Risk Assessment, last 12 mths 6/15/2022   Able to walk? Yes   Fall in past 12 months? 0   Do you feel unsteady? 0   Are you worried about falling 0   Number of falls in past 12 months -   Fall with injury? -       Alcohol Risk Factor Screening:    reports current alcohol use of about 2.0 standard drinks of alcohol per week. Functional Ability and Level of Safety:   Hearing Loss  Hearing is good.      Activities of Daily Living  The home contains: handrails  Patient does total self care     Cognitive Screening   Evaluation of Cognitive Function:  Has your family/caregiver stated any concerns about your memory: no    Patient Care Team   Patient Care Team:  Alex Sanchez as PCP - REHABILITATION [facility] Empaneled Provider  Anant Scott MD (Orthopedic Surgery)  Jacklyn Marsh Neha Velez MD (Orthopedic Surgery)  Dr. Jamal Rodriguez (Ophthalmology)  Urban Castellon MD (Gynecology)  Juanpablo Pavon MD (Cardiovascular Disease Physician)    Assessment/Plan   Education and counseling provided:  Are appropriate based on today's review and evaluation    Extended Emergency Contact Information  Primary Emergency Contact: [address], [address] [facility] Phone: [phone]  Mobile Phone: [phone]  Relation: Spouse    ACP on file    I have reviewed the patient's medical history in detail and updated the computerized patient record. History     Past Medical History:   Diagnosis Date    Allergic rhinitis     Atrophic vaginitis     DM (diabetes mellitus) ([facility]     diet and exercise control    HLD (hyperlipidemia)     Hypertension     Neuropathy     Osteoporosis     meds started 2007      Past Surgical History:   Procedure Laterality Date    COLONOSCOPY N/A 4/18/2022    COLONOSCOPY performed by Boni Del Real MD at [address] HX COLONOSCOPY  2011     Current Outpatient Medications   Medication Sig Dispense Refill    atorvastatin (LIPITOR) 20 mg tablet TAKE 1 TABLET EVERY DAY 90 Tablet 1    cholecalciferol, vitamin D3, (VITAMIN D3 PO) Take 5,000 mcg by mouth daily.  docusate sodium (COLACE) 100 mg capsule Take 100 mg by mouth two (2) times daily as needed for Constipation.  POLYETHYLENE GLYCOL 3350 PO Take 17 g by mouth daily.  triamterene-hydroCHLOROthiazide (MAXZIDE) 37.5-25 mg per tablet Take 1 Tablet by mouth nightly.  latanoprost (XALATAN) 0.005 % ophthalmic solution Administer 1 Drop to both eyes nightly.        No Known Allergies  Family History   Problem Relation Age of Onset    Breast Cancer Mother 48        breast    Cancer Mother     Diabetes Brother     Hypertension Brother     Diabetes Brother     Dementia Maternal Grandmother      Social History     Tobacco Use    Smoking status: Never Smoker    Smokeless tobacco: Never Used   Substance Use Topics    Alcohol use: Yes     Comment: 2x/month 2 drinks.      Patient Active Problem List   Diagnosis Code    Common migraine G43.009    GERD (gastroesophageal reflux disease) K21.9    Osteopenia M85.80    Atrophic vaginitis N95.2    Allergic rhinitis J30.9    Type 2 diabetes mellitus with diabetic polyneuropathy ([facility] E11.42    Essential hypertension I10    Pure hypercholesterolemia E78.00    Living will, counseling/discussion Z71.89

## 2022-06-15 NOTE — PROGRESS NOTES
Divya Fan is a 66 y.o. female  Chief Complaint   Patient presents with    Yeast Infection     Visit Vitals  /80   Pulse 72   Temp 97.6 °F (36.4 °C)   Resp 20   Ht 5' 3\" (1.6 m)   Wt 134 lb (60.8 kg)   SpO2 100%   BMI 23.74 kg/m²      Health Maintenance Due   Topic Date Due    Eye Exam Retinal or Dilated  Never done    Shingrix Vaccine Age 50> (1 of 2) Never done    DTaP/Tdap/Td series (2 - Td or Tdap) 09/13/2019    Medicare Yearly Exam  03/18/2022       HPI  HTN Follow up - BP controlled at recheck:  BP Readings from Last 3 Encounters:   06/15/22 130/80   04/18/22 121/75   02/15/22 123/65     Currently taking:   Key CAD CHF Meds             atorvastatin (LIPITOR) 20 mg tablet (Taking) TAKE 1 TABLET EVERY DAY    triamterene-hydroCHLOROthiazide (MAXZIDE) 37.5-25 mg per tablet (Taking) Take 1 Tablet by mouth nightly. Lab Results   Component Value Date/Time    Creatinine 0.92 02/15/2022 09:00 AM     DM II - controlled at last check. Lab Results   Component Value Date/Time    Hemoglobin A1c 6.1 (H) 02/15/2022 09:00 AM    Hemoglobin A1c 6.1 (H) 09/28/2021 10:03 AM    Hemoglobin A1c 6.1 (H) 03/17/2021 10:50 AM    Glucose 69 02/15/2022 09:00 AM    Microalbumin/Creat ratio (mg/g creat) 6 09/28/2021 10:03 AM    Microalbumin,urine random 0.76 09/28/2021 10:03 AM    LDL, calculated 63.4 09/28/2021 10:03 AM    Creatinine 0.92 02/15/2022 09:00 AM     Currently taking:   Key Antihyperglycemic Medications     Patient is on no antihyperglycemic meds. Hx Vit D def - currently taking 4,000 units of D3 per day. Yeast infection symptoms B labia. No disharge. Has been using OTC Hydrocortisone cream + fungal cream (Lamisil) - not helping. Not concerned about STDs - no current sexual partners. ROS  Review of Systems   Respiratory: Negative for shortness of breath. Cardiovascular: Negative for chest pain and palpitations. Gastrointestinal: Negative for abdominal pain.    Genitourinary: Negative for dysuria, frequency, hematuria and urgency. Skin: Negative for itching and rash. Neurological: Negative for dizziness, loss of consciousness and weakness. EXAM  Physical Exam  Vitals and nursing note reviewed. Constitutional:       General: She is not in acute distress. Appearance: She is well-developed. HENT:      Head: Normocephalic and atraumatic. Neck:      Vascular: No JVD. Cardiovascular:      Rate and Rhythm: Normal rate and regular rhythm. Heart sounds: Normal heart sounds. Pulmonary:      Effort: Pulmonary effort is normal. No respiratory distress. Breath sounds: Normal breath sounds. Genitourinary:     Vagina: No vaginal discharge. Comments: Vulva B and vaginal mucosa red, slightly inflamed. Musculoskeletal:      Cervical back: Neck supple. Skin:     General: Skin is warm and dry. Neurological:      Mental Status: She is alert and oriented to person, place, and time. Psychiatric:         Mood and Affect: Mood normal.         Behavior: Behavior normal.         Thought Content: Thought content normal.         Judgment: Judgment normal.       ASSESSMENT/PLAN  Encounter Diagnoses     ICD-10-CM ICD-9-CM   1. Encounter for Medicare annual wellness exam  Z00.00 V70.0   2. Type 2 diabetes mellitus with diabetic polyneuropathy, without long-term current use of insulin (HCC)  E11.42 250.60     357.2   3. Essential hypertension  I10 401.9   4. Pure hypercholesterolemia  E78.00 272.0   5. Vitamin D deficiency  E55.9 268.9   6.  Acute vaginitis  N76.0 616.10     Orders Placed This Encounter    VITAMIN D, 25 HYDROXY    METABOLIC PANEL, COMPREHENSIVE    LIPID PANEL    HEMOGLOBIN A1C WITH EAG    MICROALBUMIN, UR, RAND W/ MICROALB/CREAT RATIO    fluconazole (DIFLUCAN) 150 mg tablet

## 2022-07-07 LAB
25(OH)D3+25(OH)D2 SERPL-MCNC: 59 NG/ML (ref 30–100)
ALBUMIN SERPL-MCNC: 4.8 G/DL (ref 3.7–4.7)
ALBUMIN/CREAT UR: 6 MG/G CREAT (ref 0–29)
ALBUMIN/GLOB SERPL: 2 {RATIO} (ref 1.2–2.2)
ALP SERPL-CCNC: 41 IU/L (ref 44–121)
ALT SERPL-CCNC: 12 IU/L (ref 0–32)
AST SERPL-CCNC: 18 IU/L (ref 0–40)
BILIRUB SERPL-MCNC: 0.4 MG/DL (ref 0–1.2)
BUN SERPL-MCNC: 18 MG/DL (ref 8–27)
BUN/CREAT SERPL: 18 (ref 12–28)
CALCIUM SERPL-MCNC: 10 MG/DL (ref 8.7–10.3)
CHLORIDE SERPL-SCNC: 104 MMOL/L (ref 96–106)
CHOLEST SERPL-MCNC: 153 MG/DL (ref 100–199)
CO2 SERPL-SCNC: 26 MMOL/L (ref 20–29)
CREAT SERPL-MCNC: 1.01 MG/DL (ref 0.57–1)
CREAT UR-MCNC: 140.9 MG/DL
EGFR: 59 ML/MIN/1.73
EST. AVERAGE GLUCOSE BLD GHB EST-MCNC: 128 MG/DL
GLOBULIN SER CALC-MCNC: 2.4 G/DL (ref 1.5–4.5)
GLUCOSE SERPL-MCNC: 99 MG/DL (ref 65–99)
HBA1C MFR BLD: 6.1 % (ref 4.8–5.6)
HDLC SERPL-MCNC: 58 MG/DL
IMP & REVIEW OF LAB RESULTS: NORMAL
LDLC SERPL CALC-MCNC: 83 MG/DL (ref 0–99)
MICROALBUMIN UR-MCNC: 7.9 UG/ML
POTASSIUM SERPL-SCNC: 4.1 MMOL/L (ref 3.5–5.2)
PROT SERPL-MCNC: 7.2 G/DL (ref 6–8.5)
SODIUM SERPL-SCNC: 143 MMOL/L (ref 134–144)
TRIGL SERPL-MCNC: 57 MG/DL (ref 0–149)
VLDLC SERPL CALC-MCNC: 12 MG/DL (ref 5–40)

## 2022-07-11 NOTE — PROGRESS NOTES
Vitamin D looks great, Liver enzymes, kidney function, and electrolytes are all normal/acceptable. Good! Your diabetes is controlled. Keep up the good work!

## 2022-08-04 ENCOUNTER — TELEPHONE (OUTPATIENT)
Dept: OBGYN CLINIC | Age: 72
End: 2022-08-04

## 2022-08-04 ENCOUNTER — TELEPHONE (OUTPATIENT)
Dept: INTERNAL MEDICINE CLINIC | Age: 72
End: 2022-08-04

## 2022-08-04 DIAGNOSIS — R23.9 SKIN CHANGE: Primary | ICD-10-CM

## 2022-08-04 NOTE — TELEPHONE ENCOUNTER
----- Message from Robert Deepa sent at 8/4/2022  8:57 AM EDT -----  Subject: Referral Request    Reason for referral request? Pt calling for a dermatologist referral. Skin   on legs if very dry and looks like alligator skin needs medical attention. Face is dry as well. Looking for someone that is closer to her as well. Would like to be seen as soon as possible by one. Provider patient wants to be referred to(if known):     Provider Phone Number(if known): Additional Information for Provider? Try the mobile # if the home number   no one answers.   ---------------------------------------------------------------------------  --------------  Jeri Lilly [facility]    [phone]; OK to leave message on voicemail  ---------------------------------------------------------------------------  --------------

## 2022-08-04 NOTE — TELEPHONE ENCOUNTER
----- Message from Serena Guadarrama sent at 8/4/2022  8:57 AM EDT -----  Subject: Referral Request    Reason for referral request? Pt calling for a dermatologist referral. Skin   on legs if very dry and looks like alligator skin needs medical attention. Face is dry as well. Looking for someone that is closer to her as well. Would like to be seen as soon as possible by one. Provider patient wants to be referred to(if known):     Provider Phone Number(if known): Additional Information for Provider? Try the mobile # if the home number   no one answers.   ---------------------------------------------------------------------------  --------------  Vinicius Brown [facility]    [phone]; OK to leave message on voicemail  ---------------------------------------------------------------------------  --------------

## 2022-08-05 NOTE — TELEPHONE ENCOUNTER
These are my favorite dermatology practices:     [facility]  Address: [address] [facility], [facility]  Phone: [phone]   [address], [address], [address]     [phone]    [facility]  Address: [address] [facility], [address] [place] [address] Road  Phone: [phone]    [facility]  Phone: [phone]  [facility], [address] [place] [address]  Or  [address] [place] [address], [address]    [facility]  [address] [facility], [address]  [place] [address]

## 2022-10-11 RX ORDER — ATORVASTATIN CALCIUM 20 MG/1
TABLET, FILM COATED ORAL
Qty: 90 TABLET | Refills: 1 | Status: SHIPPED | OUTPATIENT
Start: 2022-10-11

## 2022-12-07 ENCOUNTER — OFFICE VISIT (OUTPATIENT)
Dept: INTERNAL MEDICINE CLINIC | Age: 72
End: 2022-12-07
Payer: MEDICARE

## 2022-12-07 VITALS
SYSTOLIC BLOOD PRESSURE: 147 MMHG | TEMPERATURE: 98.2 F | DIASTOLIC BLOOD PRESSURE: 67 MMHG | OXYGEN SATURATION: 99 % | HEART RATE: 71 BPM | RESPIRATION RATE: 16 BRPM | BODY MASS INDEX: 23.04 KG/M2 | WEIGHT: 130 LBS | HEIGHT: 63 IN

## 2022-12-07 DIAGNOSIS — E11.42 TYPE 2 DIABETES MELLITUS WITH DIABETIC POLYNEUROPATHY, WITHOUT LONG-TERM CURRENT USE OF INSULIN (HCC): ICD-10-CM

## 2022-12-07 DIAGNOSIS — I10 ESSENTIAL HYPERTENSION: ICD-10-CM

## 2022-12-07 DIAGNOSIS — E78.00 PURE HYPERCHOLESTEROLEMIA: ICD-10-CM

## 2022-12-07 DIAGNOSIS — R82.90 ABNORMAL URINE ODOR: ICD-10-CM

## 2022-12-07 DIAGNOSIS — E11.42 TYPE 2 DIABETES MELLITUS WITH DIABETIC POLYNEUROPATHY, WITHOUT LONG-TERM CURRENT USE OF INSULIN (HCC): Primary | ICD-10-CM

## 2022-12-07 PROCEDURE — G8399 PT W/DXA RESULTS DOCUMENT: HCPCS | Performed by: PHYSICIAN ASSISTANT

## 2022-12-07 PROCEDURE — G8427 DOCREV CUR MEDS BY ELIG CLIN: HCPCS | Performed by: PHYSICIAN ASSISTANT

## 2022-12-07 PROCEDURE — G8536 NO DOC ELDER MAL SCRN: HCPCS | Performed by: PHYSICIAN ASSISTANT

## 2022-12-07 PROCEDURE — G9899 SCRN MAM PERF RSLTS DOC: HCPCS | Performed by: PHYSICIAN ASSISTANT

## 2022-12-07 PROCEDURE — 99214 OFFICE O/P EST MOD 30 MIN: CPT | Performed by: PHYSICIAN ASSISTANT

## 2022-12-07 PROCEDURE — 1101F PT FALLS ASSESS-DOCD LE1/YR: CPT | Performed by: PHYSICIAN ASSISTANT

## 2022-12-07 PROCEDURE — G8420 CALC BMI NORM PARAMETERS: HCPCS | Performed by: PHYSICIAN ASSISTANT

## 2022-12-07 PROCEDURE — 3044F HG A1C LEVEL LT 7.0%: CPT | Performed by: PHYSICIAN ASSISTANT

## 2022-12-07 PROCEDURE — G8754 DIAS BP LESS 90: HCPCS | Performed by: PHYSICIAN ASSISTANT

## 2022-12-07 PROCEDURE — G8753 SYS BP > OR = 140: HCPCS | Performed by: PHYSICIAN ASSISTANT

## 2022-12-07 PROCEDURE — G8432 DEP SCR NOT DOC, RNG: HCPCS | Performed by: PHYSICIAN ASSISTANT

## 2022-12-07 PROCEDURE — 3017F COLORECTAL CA SCREEN DOC REV: CPT | Performed by: PHYSICIAN ASSISTANT

## 2022-12-07 PROCEDURE — 1090F PRES/ABSN URINE INCON ASSESS: CPT | Performed by: PHYSICIAN ASSISTANT

## 2022-12-07 PROCEDURE — 2022F DILAT RTA XM EVC RTNOPTHY: CPT | Performed by: PHYSICIAN ASSISTANT

## 2022-12-07 RX ORDER — AMLODIPINE BESYLATE 2.5 MG/1
2.5 TABLET ORAL DAILY
Qty: 30 TABLET | Refills: 5 | Status: SHIPPED | OUTPATIENT
Start: 2022-12-07

## 2022-12-07 NOTE — PROGRESS NOTES
Cheryl Menendez is a 67 y.o. female  Chief Complaint   Patient presents with    Follow-up     Pt has noticed she has an oder - stared two weeks ago      Visit Vitals  BP (!) 147/67 (BP 1 Location: Left upper arm, BP Patient Position: Sitting, BP Cuff Size: Adult)   Pulse 71   Temp 98.2 °F (36.8 °C) (Temporal)   Resp 16   Ht 5' 3\" (1.6 m)   Wt 130 lb (59 kg)   SpO2 99%   BMI 23.03 kg/m²      Health Maintenance Due   Topic Date Due    Eye Exam Retinal or Dilated  Never done    Shingrix Vaccine Age 50> (1 of 2) Never done    DTaP/Tdap/Td series (2 - Td or Tdap) 09/13/2019    COVID-19 Vaccine (4 - Booster for Pfizer series) 12/20/2021    Foot Exam Q1  08/01/2022       HPI  Urinary odor x 2 weeks. No discomfort. HTN Follow up - BP uncontrolled:  BP Readings from Last 3 Encounters:   12/07/22 (!) 147/67   06/15/22 130/80   04/18/22 121/75     Currently taking:   Key CAD CHF Meds               atorvastatin (LIPITOR) 20 mg tablet (Taking) TAKE 1 TABLET EVERY DAY    triamterene-hydroCHLOROthiazide (MAXZIDE) 37.5-25 mg per tablet (Taking) Take 1 Tablet by mouth nightly. Lab Results   Component Value Date/Time    Creatinine 1.01 (H) 07/06/2022 11:15 AM     DM II - controlled. Due for recheck. Lab Results   Component Value Date/Time    Hemoglobin A1c 6.1 (H) 07/06/2022 11:15 AM    Hemoglobin A1c 6.1 (H) 02/15/2022 09:00 AM    Hemoglobin A1c 6.1 (H) 09/28/2021 10:03 AM    Glucose 99 07/06/2022 11:15 AM    Microalbumin/Creat ratio (mg/g creat) 6 09/28/2021 10:03 AM    Microalb/Creat ratio (ug/mg creat.) 6 07/06/2022 11:15 AM    Microalbumin,urine random 0.76 09/28/2021 10:03 AM    LDL, calculated 83 07/06/2022 11:15 AM    LDL, calculated 63.4 09/28/2021 10:03 AM    Creatinine 1.01 (H) 07/06/2022 11:15 AM     Currently taking:   Key Antihyperglycemic Medications       Patient is on no antihyperglycemic meds. Has been working on losing weight. Pleased with progress.    Wt Readings from Last 3 Encounters: 12/07/22 130 lb (59 kg)   06/15/22 134 lb (60.8 kg)   04/18/22 132 lb 4.4 oz (60 kg)     ROS  Review of Systems   Respiratory:  Negative for shortness of breath. Cardiovascular:  Negative for chest pain and palpitations. Genitourinary:  Negative for dysuria, flank pain, frequency, hematuria and urgency. Neurological:  Negative for dizziness, loss of consciousness and weakness. EXAM  Physical Exam  Vitals and nursing note reviewed. Constitutional:       General: She is not in acute distress. Appearance: She is well-developed. HENT:      Head: Normocephalic and atraumatic. Neck:      Vascular: No JVD. Cardiovascular:      Rate and Rhythm: Normal rate and regular rhythm. Heart sounds: Normal heart sounds. Pulmonary:      Effort: Pulmonary effort is normal. No respiratory distress. Breath sounds: Normal breath sounds. Musculoskeletal:      Cervical back: Neck supple. Skin:     General: Skin is warm and dry. Neurological:      Mental Status: She is alert and oriented to person, place, and time. Psychiatric:         Mood and Affect: Mood normal.         Behavior: Behavior normal.         Thought Content: Thought content normal.         Judgment: Judgment normal.     ASSESSMENT/PLAN  Encounter Diagnoses     ICD-10-CM ICD-9-CM   1. Type 2 diabetes mellitus with diabetic polyneuropathy, without long-term current use of insulin (HCC)  E11.42 250.60     357.2   2. Essential hypertension  I10 401.9   3. Pure hypercholesterolemia  E78.00 272.0   4.  Abnormal urine odor  R82.90 791.9     Orders Placed This Encounter    URINALYSIS W/ REFLEX CULTURE    METABOLIC PANEL, COMPREHENSIVE    HEMOGLOBIN A1C WITH EAG    Add amLODIPine (NORVASC) 2.5 mg tablet

## 2022-12-08 LAB
ALBUMIN SERPL-MCNC: 4.2 G/DL (ref 3.5–5)
ALBUMIN/GLOB SERPL: 1.4 {RATIO} (ref 1.1–2.2)
ALP SERPL-CCNC: 44 U/L (ref 45–117)
ALT SERPL-CCNC: 20 U/L (ref 12–78)
ANION GAP SERPL CALC-SCNC: 4 MMOL/L (ref 5–15)
APPEARANCE UR: CLEAR
AST SERPL-CCNC: 11 U/L (ref 15–37)
BACTERIA URNS QL MICRO: NEGATIVE /HPF
BILIRUB SERPL-MCNC: 0.3 MG/DL (ref 0.2–1)
BILIRUB UR QL: NEGATIVE
BUN SERPL-MCNC: 18 MG/DL (ref 6–20)
BUN/CREAT SERPL: 20 (ref 12–20)
CALCIUM SERPL-MCNC: 9.8 MG/DL (ref 8.5–10.1)
CHLORIDE SERPL-SCNC: 107 MMOL/L (ref 97–108)
CO2 SERPL-SCNC: 30 MMOL/L (ref 21–32)
COLOR UR: ABNORMAL
CREAT SERPL-MCNC: 0.91 MG/DL (ref 0.55–1.02)
EPITH CASTS URNS QL MICRO: ABNORMAL /LPF
EST. AVERAGE GLUCOSE BLD GHB EST-MCNC: 117 MG/DL
GLOBULIN SER CALC-MCNC: 3 G/DL (ref 2–4)
GLUCOSE SERPL-MCNC: 94 MG/DL (ref 65–100)
GLUCOSE UR STRIP.AUTO-MCNC: NEGATIVE MG/DL
HBA1C MFR BLD: 5.7 % (ref 4–5.6)
HGB UR QL STRIP: NEGATIVE
HYALINE CASTS URNS QL MICRO: ABNORMAL /LPF (ref 0–5)
KETONES UR QL STRIP.AUTO: ABNORMAL MG/DL
LEUKOCYTE ESTERASE UR QL STRIP.AUTO: NEGATIVE
NITRITE UR QL STRIP.AUTO: NEGATIVE
PH UR STRIP: 7 [PH] (ref 5–8)
POTASSIUM SERPL-SCNC: 4 MMOL/L (ref 3.5–5.1)
PROT SERPL-MCNC: 7.2 G/DL (ref 6.4–8.2)
PROT UR STRIP-MCNC: NEGATIVE MG/DL
RBC #/AREA URNS HPF: ABNORMAL /HPF (ref 0–5)
SODIUM SERPL-SCNC: 141 MMOL/L (ref 136–145)
SP GR UR REFRACTOMETRY: 1.02 (ref 1–1.03)
UA: UC IF INDICATED,UAUC: ABNORMAL
UROBILINOGEN UR QL STRIP.AUTO: 0.2 EU/DL (ref 0.2–1)
WBC URNS QL MICRO: ABNORMAL /HPF (ref 0–4)

## 2022-12-08 NOTE — PROGRESS NOTES
Your urine is normal. Diabetes is BEAUTIFULLY controlled. You can eat a few more carbs or fruit if you'd like to. Liver enzymes, kidney function, and electrolytes are all normal/acceptable. Good!

## 2022-12-21 ENCOUNTER — TELEPHONE (OUTPATIENT)
Dept: INTERNAL MEDICINE CLINIC | Age: 72
End: 2022-12-21

## 2022-12-21 NOTE — TELEPHONE ENCOUNTER
Pt states that [facility] and she was given [place] for a respiratory infection. She was concerned about taking these medications along with what she is currently taking and would like to know if it would be okay to do so. She is requesting a callback when there is further information on this.

## 2023-01-05 ENCOUNTER — NURSE TRIAGE (OUTPATIENT)
Dept: OTHER | Facility: CLINIC | Age: 73
End: 2023-01-05

## 2023-01-05 NOTE — TELEPHONE ENCOUNTER
Location of patient: [address] [place] at [facility] with [employer]. Subjective: Caller states \"I have chest and head congestion\"     Current Symptoms: Went to [facility] for these symptoms 2-2.5 weeks ago and was prescribed an antibiotic and steroid. Negative for COVID and flu at that time. Completed the steroids and Amox last week-did not get better. SOB with talking. No cough or sore throat. Tightness in center of chest from congestion. Head congestion also. Becoming hoarse. No wheezing. Onset: 2 weeks ago; worsening    Associated Symptoms: reduced activity    Temperature: denies fever     What has been tried: NyQuil    LMP: NA Pregnant: NA    Recommended disposition: See in Office Today or Tomorrow    Care advice provided, patient verbalizes understanding; denies any other questions or concerns; instructed to call back for any new or worsening symptoms. Patient/Caller agrees with recommended disposition; writer provided warm transfer to [facility] at [facility] for appointment scheduling    Attention Provider: Thank you for allowing me to participate in the care of your patient. The patient was connected to triage in response to information provided to the [facility]. Please do not respond through this encounter as the response is not directed to a shared pool.     Reason for Disposition   Sinus congestion (pressure, fullness) present > 10 days    Protocols used: Common Cold-ADULT-[place]

## 2023-01-06 ENCOUNTER — OFFICE VISIT (OUTPATIENT)
Dept: INTERNAL MEDICINE CLINIC | Age: 73
End: 2023-01-06
Payer: MEDICARE

## 2023-01-06 VITALS
DIASTOLIC BLOOD PRESSURE: 63 MMHG | TEMPERATURE: 98.9 F | HEIGHT: 63 IN | RESPIRATION RATE: 19 BRPM | SYSTOLIC BLOOD PRESSURE: 140 MMHG | WEIGHT: 129.4 LBS | HEART RATE: 98 BPM | BODY MASS INDEX: 22.93 KG/M2 | OXYGEN SATURATION: 98 %

## 2023-01-06 DIAGNOSIS — J06.9 URI WITH COUGH AND CONGESTION: Primary | ICD-10-CM

## 2023-01-06 DIAGNOSIS — Z91.89 AT HIGH RISK FOR COMPLICATED GRIEVING: ICD-10-CM

## 2023-01-06 DIAGNOSIS — R07.89 FEELING OF CHEST TIGHTNESS: ICD-10-CM

## 2023-01-06 RX ORDER — ALBUTEROL SULFATE 90 UG/1
1 AEROSOL, METERED RESPIRATORY (INHALATION)
Qty: 18 G | Refills: 0 | Status: SHIPPED | OUTPATIENT
Start: 2023-01-06

## 2023-01-06 NOTE — PROGRESS NOTES
Monica Garrett is a 67 y.o. female  Chief Complaint   Patient presents with    Shortness of Breath     Pt states started with congestion and drainage 3wks ago. [facility] w[facility] ago because she was still having tightness in chest and is still here today     Visit Vitals  BP (!) 140/63 (BP 1 Location: Left upper arm, BP Patient Position: Sitting, BP Cuff Size: Adult)   Pulse 98   Temp 98.9 °F (37.2 °C) (Temporal)   Resp 19   Ht 5' 3\" (1.6 m)   Wt 129 lb 6.4 oz (58.7 kg)   SpO2 98%   BMI 22.92 kg/m²      Past Medical History:   Diagnosis Date    Allergic rhinitis     Atrophic vaginitis     DM (diabetes mellitus) (HCC)     diet and exercise control    HLD (hyperlipidemia)     Hypertension     Neuropathy     Osteoporosis     meds started 2007     Past Surgical History:   Procedure Laterality Date    COLONOSCOPY N/A 4/18/2022    COLONOSCOPY performed by Makayla Nunez MD at [facility] ENDOSCOPY    HX COLONOSCOPY  2011         HPI  2 weeks ago symptoms started  (Lost  3 weeks ago. Feels may be internalizing grief. \"Not sure\")  [facility] went with cough and congestion. Was given Amoxicillin 7 days and a steroid. Cough dry. Hoarse. No sore throat. Here today with feeling chest tightness with exertion. Non smoker/ No asthma or COPD  No wheeze or SOB. No fever. Still taking Dayquil and Nyquil off and on     ROS  Review of Systems   Constitutional:  Negative for chills, fever, malaise/fatigue and weight loss. HENT:  Negative for ear pain, sinus pain and sore throat. Respiratory:  Positive for cough and shortness of breath. Negative for sputum production and wheezing. Cardiovascular:  Negative for chest pain and palpitations. Gastrointestinal:  Negative for abdominal pain, nausea and vomiting. Neurological:  Negative for dizziness and headaches. EXAM  Physical Exam  Vitals and nursing note reviewed. Constitutional:       General: She is not in acute distress.      Appearance: Normal appearance. She is normal weight. HENT:      Head: Normocephalic and atraumatic. Nose: Rhinorrhea present. Mouth/Throat:      Mouth: Mucous membranes are moist.      Pharynx: No posterior oropharyngeal erythema. Eyes:      Pupils: Pupils are equal, round, and reactive to light. Cardiovascular:      Rate and Rhythm: Normal rate and regular rhythm. Pulses: Normal pulses. Heart sounds: Normal heart sounds. Pulmonary:      Effort: Pulmonary effort is normal.      Breath sounds: Normal breath sounds. No wheezing, rhonchi or rales. Chest:      Chest wall: No tenderness. Musculoskeletal:         General: Normal range of motion. Cervical back: Normal range of motion and neck supple. Lymphadenopathy:      Cervical: No cervical adenopathy. Skin:     General: Skin is warm. Neurological:      General: No focal deficit present. Mental Status: She is alert. Psychiatric:         Mood and Affect: Mood normal.     ASSESSMENT/PLAN      ICD-10-CM ICD-9-CM    1. URI with cough and congestion  J06.9 465.9 XR CHEST PA LAT  Chest exam normal  Orders Placed This Encounter    XR CHEST PA LAT     Standing Status:   Future     Standing Expiration Date:   2/6/2024     Order Specific Question:   Reason for Exam     Answer:   evaluate fro pneumonia    albuterol (PROVENTIL HFA, VENTOLIN HFA, PROAIR HFA) 90 mcg/actuation inhaler     Sig: Take 1 Puff by inhalation every six (6) hours as needed for Shortness of Breath or Respiratory Distress. Dispense:  18 g     Refill:  0     Use inhaler with SOB/ DEMONSTRATED USE      2. Feeling of chest tightness  R07.89 786.59 XR CHEST PA LAT      3. At high risk for complicated grieving  F74.06 V15.89 Tearful/ of loss        Patient will continue current treatment  Add inhaler if needed PRN  IF feeling worse/ X ray  Return as scheduled 1/16.   Signed By: VICENTE Wallace     January 6, 2023

## 2023-01-06 NOTE — PROGRESS NOTES
Chief Complaint   Patient presents with    Shortness of Breath     Pt states started with congestion and drainage 3wks ago. [facility] ago because she was still having tightness in chest and is still here today          Vitals:    01/06/23 0954   BP: (!) 140/63   Pulse: 98   Resp: 19   Temp: 98.9 °F (37.2 °C)   TempSrc: Temporal   SpO2: 98%   Weight: 129 lb 6.4 oz (58.7 kg)   Height: 5' 3\" (1.6 m)   PainSc:   2   PainLoc: Chest       Current Outpatient Medications on File Prior to Visit   Medication Sig Dispense Refill    amLODIPine (NORVASC) 2.5 mg tablet Take 1 Tablet by mouth daily. 30 Tablet 5    atorvastatin (LIPITOR) 20 mg tablet TAKE 1 TABLET EVERY DAY 90 Tablet 1    cholecalciferol, vitamin D3, (VITAMIN D3 PO) Take 5,000 mcg by mouth daily. POLYETHYLENE GLYCOL 3350 PO Take 17 g by mouth as needed. triamterene-hydroCHLOROthiazide (MAXZIDE) 37.5-25 mg per tablet Take 1 Tablet by mouth nightly. latanoprost (XALATAN) 0.005 % ophthalmic solution Administer 1 Drop to both eyes nightly. No current facility-administered medications on file prior to visit. Health Maintenance Due   Topic    Eye Exam Retinal or Dilated     Shingles Vaccine (1 of 2)    DTaP/Tdap/Td series (2 - Td or Tdap)    COVID-19 Vaccine (4 - Booster for [employer] series)       1. \"Have you been to the ER, urgent care clinic since your last visit? Hospitalized since your last visit? \" Yes [facility] 2wks ago give amoxicillin and methylprednisolone    2. \"Have you seen or consulted any other health care providers outside of the [address] [place] [address] since your last visit? \" No     3. For patients aged 39-70: Has the patient had a colonoscopy / FIT/ Cologuard? Yes - no Care Gap present      If the patient is female:    4. For patients aged 41-77: Has the patient had a mammogram within the past 2 years? Yes - no Care Gap present      5. For patients aged 21-65: Has the patient had a pap smear?  Yes - no Care Gap present

## 2023-01-11 ENCOUNTER — TRANSCRIBE ORDER (OUTPATIENT)
Dept: SCHEDULING | Age: 73
End: 2023-01-11

## 2023-01-11 DIAGNOSIS — Z12.31 SCREENING MAMMOGRAM FOR HIGH-RISK PATIENT: Primary | ICD-10-CM

## 2023-01-16 ENCOUNTER — OFFICE VISIT (OUTPATIENT)
Dept: INTERNAL MEDICINE CLINIC | Age: 73
End: 2023-01-16
Payer: MEDICARE

## 2023-01-16 VITALS
DIASTOLIC BLOOD PRESSURE: 46 MMHG | TEMPERATURE: 98.7 F | HEART RATE: 78 BPM | RESPIRATION RATE: 16 BRPM | BODY MASS INDEX: 23.04 KG/M2 | HEIGHT: 63 IN | WEIGHT: 130 LBS | SYSTOLIC BLOOD PRESSURE: 138 MMHG | OXYGEN SATURATION: 98 %

## 2023-01-16 DIAGNOSIS — R10.11 RUQ PAIN: ICD-10-CM

## 2023-01-16 DIAGNOSIS — E11.42 TYPE 2 DIABETES MELLITUS WITH DIABETIC POLYNEUROPATHY, WITHOUT LONG-TERM CURRENT USE OF INSULIN (HCC): ICD-10-CM

## 2023-01-16 DIAGNOSIS — F43.21 GRIEF: ICD-10-CM

## 2023-01-16 DIAGNOSIS — J30.9 ALLERGIC RHINITIS, UNSPECIFIED SEASONALITY, UNSPECIFIED TRIGGER: ICD-10-CM

## 2023-01-16 DIAGNOSIS — R10.11 RUQ PAIN: Primary | ICD-10-CM

## 2023-01-16 PROCEDURE — G9899 SCRN MAM PERF RSLTS DOC: HCPCS | Performed by: PHYSICIAN ASSISTANT

## 2023-01-16 PROCEDURE — 1090F PRES/ABSN URINE INCON ASSESS: CPT | Performed by: PHYSICIAN ASSISTANT

## 2023-01-16 PROCEDURE — G8399 PT W/DXA RESULTS DOCUMENT: HCPCS | Performed by: PHYSICIAN ASSISTANT

## 2023-01-16 PROCEDURE — 2022F DILAT RTA XM EVC RTNOPTHY: CPT | Performed by: PHYSICIAN ASSISTANT

## 2023-01-16 PROCEDURE — 99214 OFFICE O/P EST MOD 30 MIN: CPT | Performed by: PHYSICIAN ASSISTANT

## 2023-01-16 PROCEDURE — 3017F COLORECTAL CA SCREEN DOC REV: CPT | Performed by: PHYSICIAN ASSISTANT

## 2023-01-16 PROCEDURE — 1101F PT FALLS ASSESS-DOCD LE1/YR: CPT | Performed by: PHYSICIAN ASSISTANT

## 2023-01-16 PROCEDURE — G8427 DOCREV CUR MEDS BY ELIG CLIN: HCPCS | Performed by: PHYSICIAN ASSISTANT

## 2023-01-16 PROCEDURE — G8432 DEP SCR NOT DOC, RNG: HCPCS | Performed by: PHYSICIAN ASSISTANT

## 2023-01-16 PROCEDURE — G8420 CALC BMI NORM PARAMETERS: HCPCS | Performed by: PHYSICIAN ASSISTANT

## 2023-01-16 PROCEDURE — 3046F HEMOGLOBIN A1C LEVEL >9.0%: CPT | Performed by: PHYSICIAN ASSISTANT

## 2023-01-16 PROCEDURE — G8536 NO DOC ELDER MAL SCRN: HCPCS | Performed by: PHYSICIAN ASSISTANT

## 2023-01-16 RX ORDER — CETIRIZINE HYDROCHLORIDE 10 MG/1
10 TABLET ORAL
COMMUNITY

## 2023-01-16 NOTE — PROGRESS NOTES
Tiffanie Gaines is a 67 y.o. female  Chief Complaint   Patient presents with    Follow-up     Medication eval - pt states that she is having some right side abdominal pain off and on since 01/15/2023     Visit Vitals  BP (!) 138/46 (BP 1 Location: Left upper arm, BP Patient Position: Sitting, BP Cuff Size: Adult)   Pulse 78   Temp 98.7 °F (37.1 °C) (Temporal)   Resp 16   Ht 5' 3\" (1.6 m)   Wt 130 lb (59 kg)   SpO2 98%   BMI 23.03 kg/m²      Health Maintenance Due   Topic Date Due    Eye Exam Retinal or Dilated  Never done    Shingles Vaccine (1 of 2) Never done    DTaP/Tdap/Td series (2 - Td or Tdap) 09/13/2019    COVID-19 Vaccine (4 - Booster for Pfizer series) 12/20/2021       HPI  RUQ abd pain in evenings off and on x several weeks. Chronic constiption is slowly improving with stool softeners. HTN Follow up - BP controlled:  BP Readings from Last 3 Encounters:   01/16/23 (!) 138/46   01/06/23 (!) 140/63   12/07/22 (!) 147/67     Currently taking:   Key CAD CHF Meds               amLODIPine (NORVASC) 2.5 mg tablet (Taking) Take 1 Tablet by mouth daily. atorvastatin (LIPITOR) 20 mg tablet (Taking) TAKE 1 TABLET EVERY DAY    triamterene-hydroCHLOROthiazide (MAXZIDE) 37.5-25 mg per tablet (Taking) Take 1 Tablet by mouth nightly.           Lab Results   Component Value Date/Time    Creatinine 0.91 12/07/2022 02:44 PM     DM II - diet controlled    Lab Results   Component Value Date/Time    Hemoglobin A1c 5.7 (H) 12/07/2022 02:44 PM    Hemoglobin A1c 6.1 (H) 07/06/2022 11:15 AM    Hemoglobin A1c 6.1 (H) 02/15/2022 09:00 AM    Glucose 94 12/07/2022 02:44 PM    Microalbumin/Creat ratio (mg/g creat) 6 09/28/2021 10:03 AM    Microalb/Creat ratio (ug/mg creat.) 6 07/06/2022 11:15 AM    Microalbumin,urine random 0.76 09/28/2021 10:03 AM    LDL, calculated 83 07/06/2022 11:15 AM    LDL, calculated 63.4 09/28/2021 10:03 AM    Creatinine 0.91 12/07/2022 02:44 PM     Currently taking:   Key Antihyperglycemic Medications Patient is on no antihyperglycemic meds. Wt Readings from Last 3 Encounters:   01/16/23 130 lb (59 kg)   01/06/23 129 lb 6.4 oz (58.7 kg)   12/07/22 130 lb (59 kg)   Weight is stable and healthy. Grieving 's death, but feels that she doesn't need medication. Has good social support. Had Sinusitis tx with AB earlier this month. Still having some sinus drainage. No sinus pain/fever. ROS  Review of Systems   Constitutional:  Negative for fever. HENT:  Positive for congestion. Negative for sinus pain. Respiratory:  Negative for cough and shortness of breath. Cardiovascular:  Negative for chest pain and palpitations. Gastrointestinal:  Positive for abdominal pain and constipation. Negative for blood in stool, diarrhea, melena, nausea and vomiting. Genitourinary:  Negative for dysuria, frequency, hematuria and urgency. Neurological:  Negative for dizziness, loss of consciousness and weakness. EXAM  Physical Exam  Vitals and nursing note reviewed. Constitutional:       General: She is not in acute distress. Appearance: Normal appearance. She is well-developed. She is not ill-appearing or diaphoretic. HENT:      Head: Normocephalic and atraumatic. Ears:      Comments: B TMs with fluid. No erythema. Nose: Congestion and rhinorrhea present. Comments: Pale, inflamed nasal mucosa  Eyes:      Conjunctiva/sclera: Conjunctivae normal.   Neck:      Vascular: No JVD. Cardiovascular:      Rate and Rhythm: Normal rate and regular rhythm. Heart sounds: Normal heart sounds. Pulmonary:      Effort: Pulmonary effort is normal. No respiratory distress. Breath sounds: Normal breath sounds. Abdominal:      General: Bowel sounds are normal. There is no distension. Palpations: Abdomen is soft. There is no mass. Tenderness: There is no abdominal tenderness. There is no guarding or rebound. Hernia: No hernia is present.    Musculoskeletal: Cervical back: Neck supple. Right lower leg: No edema. Left lower leg: No edema. Lymphadenopathy:      Cervical: No cervical adenopathy. Skin:     General: Skin is warm and dry. Neurological:      Mental Status: She is alert and oriented to person, place, and time. Psychiatric:         Mood and Affect: Mood normal.         Behavior: Behavior normal.         Thought Content: Thought content normal.         Judgment: Judgment normal.       ASSESSMENT/PLAN  Encounter Diagnoses     ICD-10-CM ICD-9-CM   1. RUQ pain  R10.11 789.01   2. Type 2 diabetes mellitus with diabetic polyneuropathy, without long-term current use of insulin ([facility])  E11.42 250.60     357.2   3. Allergic rhinitis, unspecified seasonality, unspecified trigger  J30.9 477.9   4.  Grief  F43.21 309.0     Orders Placed This Encounter    US ABD LTD    CBC W/O DIFF    METABOLIC PANEL, COMPREHENSIVE    AMYLASE    LIPASE    REFERRAL TO SOCIAL WORK    cetirizine (ZYRTEC) 10 mg tablet

## 2023-01-17 LAB
ALBUMIN SERPL-MCNC: 3.9 G/DL (ref 3.5–5)
ALBUMIN/GLOB SERPL: 1.4 (ref 1.1–2.2)
ALP SERPL-CCNC: 46 U/L (ref 45–117)
ALT SERPL-CCNC: 15 U/L (ref 12–78)
AMYLASE SERPL-CCNC: 91 U/L (ref 25–115)
ANION GAP SERPL CALC-SCNC: 4 MMOL/L (ref 5–15)
AST SERPL-CCNC: 14 U/L (ref 15–37)
BILIRUB SERPL-MCNC: 0.3 MG/DL (ref 0.2–1)
BUN SERPL-MCNC: 14 MG/DL (ref 6–20)
BUN/CREAT SERPL: 15 (ref 12–20)
CALCIUM SERPL-MCNC: 9.7 MG/DL (ref 8.5–10.1)
CHLORIDE SERPL-SCNC: 110 MMOL/L (ref 97–108)
CO2 SERPL-SCNC: 29 MMOL/L (ref 21–32)
CREAT SERPL-MCNC: 0.94 MG/DL (ref 0.55–1.02)
ERYTHROCYTE [DISTWIDTH] IN BLOOD BY AUTOMATED COUNT: 15.8 % (ref 11.5–14.5)
GLOBULIN SER CALC-MCNC: 2.8 G/DL (ref 2–4)
GLUCOSE SERPL-MCNC: 81 MG/DL (ref 65–100)
HCT VFR BLD AUTO: 38.8 % (ref 35–47)
HGB BLD-MCNC: 11.9 G/DL (ref 11.5–16)
LIPASE SERPL-CCNC: 216 U/L (ref 73–393)
MCH RBC QN AUTO: 25.7 PG (ref 26–34)
MCHC RBC AUTO-ENTMCNC: 30.7 G/DL (ref 30–36.5)
MCV RBC AUTO: 83.8 FL (ref 80–99)
NRBC # BLD: 0 K/UL (ref 0–0.01)
NRBC BLD-RTO: 0 PER 100 WBC
PLATELET # BLD AUTO: 299 K/UL (ref 150–400)
PMV BLD AUTO: 10.6 FL (ref 8.9–12.9)
POTASSIUM SERPL-SCNC: 4.5 MMOL/L (ref 3.5–5.1)
PROT SERPL-MCNC: 6.7 G/DL (ref 6.4–8.2)
RBC # BLD AUTO: 4.63 M/UL (ref 3.8–5.2)
SODIUM SERPL-SCNC: 143 MMOL/L (ref 136–145)
WBC # BLD AUTO: 5.1 K/UL (ref 3.6–11)

## 2023-01-20 NOTE — PROGRESS NOTES
Blood Count, liver enzymes, kidney function, pancreatic enzymes, and electrolytes are all normal/acceptable. Good!

## 2023-01-27 ENCOUNTER — HOSPITAL ENCOUNTER (OUTPATIENT)
Dept: ULTRASOUND IMAGING | Age: 73
Discharge: HOME | End: 2023-01-27
Attending: PHYSICIAN ASSISTANT
Payer: MEDICARE

## 2023-01-27 DIAGNOSIS — R10.11 RUQ PAIN: ICD-10-CM

## 2023-01-27 PROCEDURE — 76705 ECHO EXAM OF ABDOMEN: CPT

## 2023-01-27 NOTE — PROGRESS NOTES
Some gallstones, but no inflammation. Follow a low fat diet and let me know if your pain does not improve.

## 2023-02-23 ENCOUNTER — HOSPITAL ENCOUNTER (OUTPATIENT)
Dept: MAMMOGRAPHY | Age: 73
Discharge: HOME | End: 2023-02-23
Attending: PHYSICIAN ASSISTANT
Payer: MEDICARE

## 2023-02-23 DIAGNOSIS — Z12.31 SCREENING MAMMOGRAM FOR HIGH-RISK PATIENT: ICD-10-CM

## 2023-02-23 PROCEDURE — 77063 BREAST TOMOSYNTHESIS BI: CPT

## 2023-03-01 ENCOUNTER — TRANSCRIBE ORDER (OUTPATIENT)
Dept: SCHEDULING | Age: 73
End: 2023-03-01

## 2023-03-01 DIAGNOSIS — Z12.31 VISIT FOR SCREENING MAMMOGRAM: Primary | ICD-10-CM

## 2023-04-22 ENCOUNTER — TRANSCRIBE ORDERS (OUTPATIENT)
Facility: HOSPITAL | Age: 73
End: 2023-04-22

## 2023-04-22 DIAGNOSIS — Z12.31 VISIT FOR SCREENING MAMMOGRAM: Primary | ICD-10-CM

## 2023-05-10 ENCOUNTER — NURSE TRIAGE (OUTPATIENT)
Dept: OTHER | Facility: CLINIC | Age: 73
End: 2023-05-10

## 2023-05-10 NOTE — TELEPHONE ENCOUNTER
Location of patient: [address] goldberg from [facility] at [employer] with [employer]. Subjective: Caller states \"Headache-worse of life\"     Current Symptoms: Headaches since last year off and on, told Dr Sherril Moritz about it, MD thought it might be due to the blood pressure pills, today the headache is worse 10/10-put some warm and cold compress on, took tylenol- headache is improved to 3/10. Headache has been happening more often, every day. Has been having a headache off and on every day, lost  2 months ago. Sometimes aching in middle of forehead, sometimes left side, sometimes in right sides, sometimes above eyes. Would like to be seen today. Onset: 1 year ago; slow, intermittent, worsening    Associated Symptoms: NA    Pain Severity: 3/10; aching; intermittent    Temperature: Denies    What has been tried: Warm/ cold compress/ tylenol- helping    LMP: NA Pregnant: NA    Recommended disposition: See in Office Today    Care advice provided, patient verbalizes understanding; denies any other questions or concerns; instructed to call back for any new or worsening symptoms. Patient/Caller agrees with recommended disposition; writer provided warm transfer to [facility] at [employer] for appointment scheduling    Attention Provider: Thank you for allowing me to participate in the care of your patient. The patient was connected to triage in response to information provided to the ECC/PSC. Please do not respond through this encounter as the response is not directed to a shared pool.       Reason for Disposition   Patient wants to be seen    Protocols used: Headache-ADULT-[place]

## 2023-05-12 ENCOUNTER — OFFICE VISIT (OUTPATIENT)
Age: 73
End: 2023-05-12
Payer: MEDICARE

## 2023-05-12 VITALS
OXYGEN SATURATION: 99 % | WEIGHT: 135.2 LBS | DIASTOLIC BLOOD PRESSURE: 74 MMHG | HEART RATE: 70 BPM | TEMPERATURE: 98.1 F | HEIGHT: 63 IN | BODY MASS INDEX: 23.96 KG/M2 | SYSTOLIC BLOOD PRESSURE: 136 MMHG | RESPIRATION RATE: 20 BRPM

## 2023-05-12 DIAGNOSIS — G43.109 MIGRAINE WITH AURA AND WITHOUT STATUS MIGRAINOSUS, NOT INTRACTABLE: Primary | ICD-10-CM

## 2023-05-12 DIAGNOSIS — J30.9 ALLERGIC RHINITIS, UNSPECIFIED SEASONALITY, UNSPECIFIED TRIGGER: ICD-10-CM

## 2023-05-12 DIAGNOSIS — F45.8 BRUXISM (TEETH GRINDING): ICD-10-CM

## 2023-05-12 PROCEDURE — 3017F COLORECTAL CA SCREEN DOC REV: CPT | Performed by: PHYSICIAN ASSISTANT

## 2023-05-12 PROCEDURE — 99214 OFFICE O/P EST MOD 30 MIN: CPT | Performed by: PHYSICIAN ASSISTANT

## 2023-05-12 PROCEDURE — G8428 CUR MEDS NOT DOCUMENT: HCPCS | Performed by: PHYSICIAN ASSISTANT

## 2023-05-12 PROCEDURE — 4004F PT TOBACCO SCREEN RCVD TLK: CPT | Performed by: PHYSICIAN ASSISTANT

## 2023-05-12 PROCEDURE — 1090F PRES/ABSN URINE INCON ASSESS: CPT | Performed by: PHYSICIAN ASSISTANT

## 2023-05-12 PROCEDURE — 3075F SYST BP GE 130 - 139MM HG: CPT | Performed by: PHYSICIAN ASSISTANT

## 2023-05-12 PROCEDURE — 1123F ACP DISCUSS/DSCN MKR DOCD: CPT | Performed by: PHYSICIAN ASSISTANT

## 2023-05-12 PROCEDURE — 3078F DIAST BP <80 MM HG: CPT | Performed by: PHYSICIAN ASSISTANT

## 2023-05-12 PROCEDURE — G8399 PT W/DXA RESULTS DOCUMENT: HCPCS | Performed by: PHYSICIAN ASSISTANT

## 2023-05-12 PROCEDURE — G8420 CALC BMI NORM PARAMETERS: HCPCS | Performed by: PHYSICIAN ASSISTANT

## 2023-05-12 RX ORDER — SUMATRIPTAN 100 MG/1
TABLET, FILM COATED ORAL
Qty: 12 TABLET | Refills: 5 | Status: SHIPPED | OUTPATIENT
Start: 2023-05-12

## 2023-05-12 RX ORDER — FLUTICASONE PROPIONATE 50 MCG
2 SPRAY, SUSPENSION (ML) NASAL DAILY
COMMUNITY
Start: 2023-05-12

## 2023-05-12 SDOH — ECONOMIC STABILITY: HOUSING INSECURITY
IN THE LAST 12 MONTHS, WAS THERE A TIME WHEN YOU DID NOT HAVE A STEADY PLACE TO SLEEP OR SLEPT IN A SHELTER (INCLUDING NOW)?: NO

## 2023-05-12 SDOH — ECONOMIC STABILITY: FOOD INSECURITY: WITHIN THE PAST 12 MONTHS, THE FOOD YOU BOUGHT JUST DIDN'T LAST AND YOU DIDN'T HAVE MONEY TO GET MORE.: NEVER TRUE

## 2023-05-12 SDOH — ECONOMIC STABILITY: FOOD INSECURITY: WITHIN THE PAST 12 MONTHS, YOU WORRIED THAT YOUR FOOD WOULD RUN OUT BEFORE YOU GOT MONEY TO BUY MORE.: NEVER TRUE

## 2023-05-12 SDOH — ECONOMIC STABILITY: INCOME INSECURITY: HOW HARD IS IT FOR YOU TO PAY FOR THE VERY BASICS LIKE FOOD, HOUSING, MEDICAL CARE, AND HEATING?: NOT HARD AT ALL

## 2023-05-12 ASSESSMENT — PATIENT HEALTH QUESTIONNAIRE - PHQ9
SUM OF ALL RESPONSES TO PHQ QUESTIONS 1-9: 0
SUM OF ALL RESPONSES TO PHQ9 QUESTIONS 1 & 2: 0
2. FEELING DOWN, DEPRESSED OR HOPELESS: 0
1. LITTLE INTEREST OR PLEASURE IN DOING THINGS: 0
SUM OF ALL RESPONSES TO PHQ QUESTIONS 1-9: 0

## 2023-05-12 ASSESSMENT — ENCOUNTER SYMPTOMS
PHOTOPHOBIA: 1
SINUS PRESSURE: 0
RHINORRHEA: 0
EYE DISCHARGE: 0
EYE PAIN: 0

## 2023-05-12 NOTE — TELEPHONE ENCOUNTER
Patient has appt today, not much of headache yesterday, slight one today a pain of 2, will keep office appt.

## 2023-05-12 NOTE — PROGRESS NOTES
Candy Pham is a 67 y.o. female  Chief Complaint   Patient presents with    Headache     Vitals:    05/12/23 1338   BP: 136/74   Pulse: 70   Resp: 20   Temp: 98.1 °F (36.7 °C)   SpO2: 99%      Wt Readings from Last 3 Encounters:   05/12/23 135 lb 3.2 oz (61.3 kg)   01/16/23 130 lb (59 kg)   01/06/23 129 lb 6.4 oz (58.7 kg)     BMI Readings from Last 3 Encounters:   05/12/23 23.95 kg/m²   01/16/23 23.03 kg/m²   01/06/23 22.92 kg/m²     Health Maintenance Due   Topic Date Due    Diabetic retinal exam  Never done    Shingles vaccine (1 of 2) Never done    DTaP/Tdap/Td vaccine (2 - Td or Tdap) 09/13/2019    COVID-19 Vaccine (4 - Booster for Hogan Peter series) 12/20/2021    Diabetic foot exam  08/01/2022       HPI  Wednesday 10/10 headache. Tried warm & cold compresses, extra strength tylenol helped somewhat. Had been having headaches daily 5/10 for several month. Lots of stress - lost  9 months ago. Not having sinus pain. UTD with dentist, but does have possible teeth clenching. Has never worn a . Has occasional wavy vision related to headaches. UTD with eye doctor. HTN Follow up - BP controlled:  BP Readings from Last 3 Encounters:   05/12/23 136/74   01/16/23 (!) 138/46   01/06/23 (!) 140/63     Currently taking:   Key Anti-Hypertensive Meds            amLODIPine (NORVASC) 2.5 MG tablet (Taking)    Class: Historical Med    triamterene-hydroCHLOROthiazide (MAXZIDE-25) 37.5-25 MG per tablet (Taking)    Class: Historical Med            Lab Results   Component Value Date    CREATININE 0.94 01/16/2023         ROS  Review of Systems   Constitutional:  Negative for activity change and fever. HENT:  Negative for congestion, rhinorrhea and sinus pressure. Eyes:  Positive for photophobia and visual disturbance. Negative for pain and discharge. Allergic/Immunologic: Positive for environmental allergies. Neurological:  Positive for headaches.  Negative for dizziness, seizures, syncope,

## 2023-05-12 NOTE — PROGRESS NOTES
Patient here for headache. Chief Complaint   Patient presents with    Headache          [unfilled]    Current Outpatient Medications on File Prior to Visit   Medication Sig Dispense Refill    POLYETHYLENE GLYCOL 3350 PO Take by mouth as needed      amLODIPine (NORVASC) 2.5 MG tablet Take by mouth daily      atorvastatin (LIPITOR) 20 MG tablet TAKE 1 TABLET EVERY DAY      cetirizine (ZYRTEC) 10 MG tablet Take by mouth      latanoprost (XALATAN) 0.005 % ophthalmic solution Apply 1 drop to eye      triamterene-hydroCHLOROthiazide (MAXZIDE-25) 37.5-25 MG per tablet Take 1 tablet by mouth      Cholecalciferol 1.25 MG (51706 UT) TABS Take 5,000 mcg by mouth daily      albuterol sulfate HFA (PROVENTIL;VENTOLIN;PROAIR) 108 (90 Base) MCG/ACT inhaler Inhale 1 puff into the lungs every 6 hours as needed (Patient not taking: Reported on 5/12/2023)       No current facility-administered medications on file prior to visit. Health Maintenance Due   Topic    Diabetic retinal exam     Shingles vaccine (1 of 2)    DTaP/Tdap/Td vaccine (2 - Td or Tdap)    COVID-19 Vaccine (4 - Booster for Pfizer series)    Diabetic foot exam        1. \"Have you been to the ER, urgent care clinic since your last visit? Hospitalized since your last visit? \" No    2. \"Have you seen or consulted any other health care providers outside of the [address] [place] [address] since your last visit? \" No    3. For patients aged 39-70: Has the patient had a colonoscopy / FIT/ Cologuard? Yes      If the patient is female:    4. For patients aged 41-77: Has the patient had a mammogram within the past 2 years? Yes      5. For patients aged 21-65: Has the patient had a pap smear?  Yes

## 2023-06-02 DIAGNOSIS — I10 ESSENTIAL (PRIMARY) HYPERTENSION: ICD-10-CM

## 2023-06-02 RX ORDER — AMLODIPINE BESYLATE 2.5 MG/1
TABLET ORAL
Qty: 90 TABLET | Refills: 1 | Status: SHIPPED | OUTPATIENT
Start: 2023-06-02

## 2023-06-12 RX ORDER — ATORVASTATIN CALCIUM 20 MG/1
TABLET, FILM COATED ORAL
Qty: 90 TABLET | Refills: 0 | Status: SHIPPED | OUTPATIENT
Start: 2023-06-12

## 2023-08-28 ENCOUNTER — OFFICE VISIT (OUTPATIENT)
Age: 73
End: 2023-08-28
Payer: MEDICARE

## 2023-08-28 VITALS
WEIGHT: 130 LBS | TEMPERATURE: 97.5 F | DIASTOLIC BLOOD PRESSURE: 77 MMHG | HEIGHT: 63 IN | SYSTOLIC BLOOD PRESSURE: 131 MMHG | BODY MASS INDEX: 23.04 KG/M2 | HEART RATE: 71 BPM | RESPIRATION RATE: 16 BRPM

## 2023-08-28 DIAGNOSIS — I10 ESSENTIAL HYPERTENSION: ICD-10-CM

## 2023-08-28 DIAGNOSIS — R53.83 FATIGUE, UNSPECIFIED TYPE: ICD-10-CM

## 2023-08-28 DIAGNOSIS — E11.42 TYPE 2 DIABETES MELLITUS WITH DIABETIC POLYNEUROPATHY, WITHOUT LONG-TERM CURRENT USE OF INSULIN (HCC): ICD-10-CM

## 2023-08-28 DIAGNOSIS — Z00.00 MEDICARE ANNUAL WELLNESS VISIT, SUBSEQUENT: Primary | ICD-10-CM

## 2023-08-28 DIAGNOSIS — E78.00 PURE HYPERCHOLESTEROLEMIA: ICD-10-CM

## 2023-08-28 PROCEDURE — 3017F COLORECTAL CA SCREEN DOC REV: CPT | Performed by: PHYSICIAN ASSISTANT

## 2023-08-28 PROCEDURE — 1123F ACP DISCUSS/DSCN MKR DOCD: CPT | Performed by: PHYSICIAN ASSISTANT

## 2023-08-28 PROCEDURE — G0439 PPPS, SUBSEQ VISIT: HCPCS | Performed by: PHYSICIAN ASSISTANT

## 2023-08-28 PROCEDURE — 3046F HEMOGLOBIN A1C LEVEL >9.0%: CPT | Performed by: PHYSICIAN ASSISTANT

## 2023-08-28 PROCEDURE — 3075F SYST BP GE 130 - 139MM HG: CPT | Performed by: PHYSICIAN ASSISTANT

## 2023-08-28 PROCEDURE — 3078F DIAST BP <80 MM HG: CPT | Performed by: PHYSICIAN ASSISTANT

## 2023-08-28 RX ORDER — TRIAMTERENE AND HYDROCHLOROTHIAZIDE 37.5; 25 MG/1; MG/1
1 TABLET ORAL DAILY
Qty: 90 TABLET | Refills: 1 | Status: SHIPPED | OUTPATIENT
Start: 2023-08-28

## 2023-08-28 RX ORDER — ATORVASTATIN CALCIUM 20 MG/1
20 TABLET, FILM COATED ORAL DAILY
Qty: 90 TABLET | Refills: 1 | Status: SHIPPED | OUTPATIENT
Start: 2023-08-28

## 2023-08-28 SDOH — ECONOMIC STABILITY: FOOD INSECURITY: WITHIN THE PAST 12 MONTHS, YOU WORRIED THAT YOUR FOOD WOULD RUN OUT BEFORE YOU GOT MONEY TO BUY MORE.: NEVER TRUE

## 2023-08-28 SDOH — ECONOMIC STABILITY: FOOD INSECURITY: WITHIN THE PAST 12 MONTHS, THE FOOD YOU BOUGHT JUST DIDN'T LAST AND YOU DIDN'T HAVE MONEY TO GET MORE.: NEVER TRUE

## 2023-08-28 SDOH — ECONOMIC STABILITY: INCOME INSECURITY: HOW HARD IS IT FOR YOU TO PAY FOR THE VERY BASICS LIKE FOOD, HOUSING, MEDICAL CARE, AND HEATING?: NOT HARD AT ALL

## 2023-08-28 ASSESSMENT — PATIENT HEALTH QUESTIONNAIRE - PHQ9
SUM OF ALL RESPONSES TO PHQ QUESTIONS 1-9: 1
SUM OF ALL RESPONSES TO PHQ9 QUESTIONS 1 & 2: 1
1. LITTLE INTEREST OR PLEASURE IN DOING THINGS: 0
2. FEELING DOWN, DEPRESSED OR HOPELESS: 1

## 2023-08-29 LAB
ALBUMIN SERPL-MCNC: 4.1 G/DL (ref 3.5–5)
ALBUMIN/GLOB SERPL: 1.4 (ref 1.1–2.2)
ALP SERPL-CCNC: 50 U/L (ref 45–117)
ALT SERPL-CCNC: 16 U/L (ref 12–78)
ANION GAP SERPL CALC-SCNC: 6 MMOL/L (ref 5–15)
AST SERPL-CCNC: 15 U/L (ref 15–37)
BILIRUB SERPL-MCNC: 0.5 MG/DL (ref 0.2–1)
BUN SERPL-MCNC: 14 MG/DL (ref 6–20)
BUN/CREAT SERPL: 16 (ref 12–20)
CALCIUM SERPL-MCNC: 9.9 MG/DL (ref 8.5–10.1)
CHLORIDE SERPL-SCNC: 108 MMOL/L (ref 97–108)
CHOLEST SERPL-MCNC: 141 MG/DL
CO2 SERPL-SCNC: 27 MMOL/L (ref 21–32)
CREAT SERPL-MCNC: 0.85 MG/DL (ref 0.55–1.02)
CREAT UR-MCNC: 178 MG/DL
ERYTHROCYTE [DISTWIDTH] IN BLOOD BY AUTOMATED COUNT: 15.7 % (ref 11.5–14.5)
EST. AVERAGE GLUCOSE BLD GHB EST-MCNC: 123 MG/DL
GLOBULIN SER CALC-MCNC: 2.9 G/DL (ref 2–4)
GLUCOSE SERPL-MCNC: 91 MG/DL (ref 65–100)
HBA1C MFR BLD: 5.9 % (ref 4–5.6)
HCT VFR BLD AUTO: 40.9 % (ref 35–47)
HDLC SERPL-MCNC: 64 MG/DL
HDLC SERPL: 2.2 (ref 0–5)
HGB BLD-MCNC: 12.2 G/DL (ref 11.5–16)
LDLC SERPL CALC-MCNC: 66 MG/DL (ref 0–100)
MCH RBC QN AUTO: 24.9 PG (ref 26–34)
MCHC RBC AUTO-ENTMCNC: 29.8 G/DL (ref 30–36.5)
MCV RBC AUTO: 83.6 FL (ref 80–99)
MICROALBUMIN UR-MCNC: 2.7 MG/DL
MICROALBUMIN/CREAT UR-RTO: 15 MG/G (ref 0–30)
NRBC # BLD: 0 K/UL (ref 0–0.01)
NRBC BLD-RTO: 0 PER 100 WBC
PLATELET # BLD AUTO: 285 K/UL (ref 150–400)
PMV BLD AUTO: 10.3 FL (ref 8.9–12.9)
POTASSIUM SERPL-SCNC: 4 MMOL/L (ref 3.5–5.1)
PROT SERPL-MCNC: 7 G/DL (ref 6.4–8.2)
RBC # BLD AUTO: 4.89 M/UL (ref 3.8–5.2)
SODIUM SERPL-SCNC: 141 MMOL/L (ref 136–145)
TRIGL SERPL-MCNC: 55 MG/DL
VLDLC SERPL CALC-MCNC: 11 MG/DL
WBC # BLD AUTO: 3.7 K/UL (ref 3.6–11)

## 2023-08-30 LAB — TSH SERPL DL<=0.05 MIU/L-ACNC: 1.16 UIU/ML (ref 0.45–4.5)

## 2023-09-05 ENCOUNTER — TELEPHONE (OUTPATIENT)
Age: 73
End: 2023-09-05

## 2023-09-05 NOTE — TELEPHONE ENCOUNTER
Pt was prescribed medication and would like to know if she should take both medications or just one of them. The following medications     atorvastatin (LIPITOR) 20 MG tablet      triamterene-hydroCHLOROthiazide (MAXZIDE-25) 37.5-25 MG per tablet      Pt would like a call back as soon as possible on this matter.  (Call either the cell or home  and it is ok to leave a message on either number)

## 2023-09-07 ENCOUNTER — TELEPHONE (OUTPATIENT)
Age: 73
End: 2023-09-07

## 2023-09-07 NOTE — TELEPHONE ENCOUNTER
Called and verified pt with name and  stating that writer is following up to her phone call regarding her medication question. Pt stated that she was prescribed Triamerene-HCTZ and Atorvastatin 20 mg during her last appt with Susi Fuentes on 2023. Pt asking if it's okay to take both medications together. Informed pt that the Atorvastatin was prescribed for her cholesterol and the Triamterine-HCTZ was prescribed for her high blood pressure. Pt stated that when she picked up her Rx's from the pharmacy that she was given Amlodipine. Ms. Daniel Randolph stated that she has continued to take the Amlodipine but has not yet started the Maxzide. Informed pt per her visit notes and medication list that the Amlodipine was d/c'ed and the Maxzide was prescribed to replace the Amlodipine. Stated to pt that she should stop taking the Amlodipine and start taking the Maxzide. Pt stated that she has had dry mouth as of late. Encouraged pt to increase her water intake and to stay away from sodas and high sodium drinks and foods. Pt verbalized understanding of all things discussed with no further questions at this time.      Alesia Mon LPN

## 2023-09-07 NOTE — TELEPHONE ENCOUNTER
Pt calling about medication clarification she wants to know if she should take both or one or the other.     atorvastatin (LIPITOR) 20 MG tablet        triamterene-hydroCHLOROthiazide (MAXZIDE-25) 37.5-25 MG per tablet

## 2023-09-11 NOTE — TELEPHONE ENCOUNTER
Failed attempt to reach pt on home and mobile number listed. Left generic VM on pt's home number advising second attempt would be made to mobile number where detailed information r/t Rx clarification would be left. After review of chart and no noted clinical information indicating pt should d/c either Rx a detailed VM message was left on mobile personal VM. Pt advised she should be taking both specified Rx's, as directed. Also educated that Atorvastatin is used for cholesterol and triamterene- HCTZ for htn/ blood pressure. Pt encouraged to contact the office back with any additional questions and/or concerns.

## 2023-11-26 DIAGNOSIS — I10 ESSENTIAL (PRIMARY) HYPERTENSION: ICD-10-CM

## 2023-11-27 RX ORDER — AMLODIPINE BESYLATE 2.5 MG/1
TABLET ORAL
Qty: 90 TABLET | Refills: 1 | OUTPATIENT
Start: 2023-11-27

## 2023-11-27 NOTE — TELEPHONE ENCOUNTER
Chief Complaint   Patient presents with    Medication Refill       Requested Prescriptions     Pending Prescriptions Disp Refills    amLODIPine (NORVASC) 2.5 MG tablet [Pharmacy Med Name: AMLODIPINE BESYLATE 2.5 MG TAB] 90 tablet 1     Sig: TAKE 1 TABLET BY MOUTH EVERY DAY       Allergies:  No Known Allergies    Last visit with clinic:  8/28/2023   Next visit with clinic: Visit date not found     Last visit with this provider: 8/28/2023   Next Visit with this provider: Visit date not found    Signed by Alan Johnson [facility]  11/27/23  9:05 AM     February 16, 2023     Patient: Steve Chiu   YOB: 2017   Date of Visit: 2/16/2023       To Whom it May Concern:    Steve Chiu was seen in my clinic on 2/16/2023 at 8:40 am.     Please excuse Steve for his absence from school on the date listed above to be able to make his appointment.    He was diagnosed with a viral illness with fever and may return to school when he no longer has a fever.     Sincerely,         Lenka Sandoval, DO    Medical information is confidential and cannot be disclosed without the written consent of the patient or his representative.

## 2024-01-10 ENCOUNTER — OFFICE VISIT (OUTPATIENT)
Age: 74
End: 2024-01-10
Payer: MEDICARE

## 2024-01-10 VITALS
OXYGEN SATURATION: 99 % | HEART RATE: 92 BPM | SYSTOLIC BLOOD PRESSURE: 114 MMHG | WEIGHT: 132.2 LBS | DIASTOLIC BLOOD PRESSURE: 63 MMHG | BODY MASS INDEX: 23.42 KG/M2 | RESPIRATION RATE: 18 BRPM | TEMPERATURE: 98.8 F

## 2024-01-10 DIAGNOSIS — I10 ESSENTIAL HYPERTENSION: Primary | ICD-10-CM

## 2024-01-10 DIAGNOSIS — E11.42 TYPE 2 DIABETES MELLITUS WITH DIABETIC POLYNEUROPATHY, WITHOUT LONG-TERM CURRENT USE OF INSULIN (HCC): ICD-10-CM

## 2024-01-10 DIAGNOSIS — G89.29 CHRONIC NONINTRACTABLE HEADACHE, UNSPECIFIED HEADACHE TYPE: ICD-10-CM

## 2024-01-10 DIAGNOSIS — R51.9 CHRONIC NONINTRACTABLE HEADACHE, UNSPECIFIED HEADACHE TYPE: ICD-10-CM

## 2024-01-10 PROCEDURE — 3017F COLORECTAL CA SCREEN DOC REV: CPT | Performed by: PHYSICIAN ASSISTANT

## 2024-01-10 PROCEDURE — 2022F DILAT RTA XM EVC RTNOPTHY: CPT | Performed by: PHYSICIAN ASSISTANT

## 2024-01-10 PROCEDURE — G8420 CALC BMI NORM PARAMETERS: HCPCS | Performed by: PHYSICIAN ASSISTANT

## 2024-01-10 PROCEDURE — G8399 PT W/DXA RESULTS DOCUMENT: HCPCS | Performed by: PHYSICIAN ASSISTANT

## 2024-01-10 PROCEDURE — G8427 DOCREV CUR MEDS BY ELIG CLIN: HCPCS | Performed by: PHYSICIAN ASSISTANT

## 2024-01-10 PROCEDURE — 3074F SYST BP LT 130 MM HG: CPT | Performed by: PHYSICIAN ASSISTANT

## 2024-01-10 PROCEDURE — 99214 OFFICE O/P EST MOD 30 MIN: CPT | Performed by: PHYSICIAN ASSISTANT

## 2024-01-10 PROCEDURE — 1123F ACP DISCUSS/DSCN MKR DOCD: CPT | Performed by: PHYSICIAN ASSISTANT

## 2024-01-10 PROCEDURE — 3046F HEMOGLOBIN A1C LEVEL >9.0%: CPT | Performed by: PHYSICIAN ASSISTANT

## 2024-01-10 PROCEDURE — G8483 FLU IMM NO ADMIN DOC REA: HCPCS | Performed by: PHYSICIAN ASSISTANT

## 2024-01-10 PROCEDURE — 1090F PRES/ABSN URINE INCON ASSESS: CPT | Performed by: PHYSICIAN ASSISTANT

## 2024-01-10 PROCEDURE — 1036F TOBACCO NON-USER: CPT | Performed by: PHYSICIAN ASSISTANT

## 2024-01-10 PROCEDURE — 3078F DIAST BP <80 MM HG: CPT | Performed by: PHYSICIAN ASSISTANT

## 2024-01-10 RX ORDER — HYDROCHLOROTHIAZIDE 25 MG/1
25 TABLET ORAL EVERY MORNING
Qty: 30 TABLET | Refills: 5 | Status: SHIPPED | OUTPATIENT
Start: 2024-01-10

## 2024-01-10 RX ORDER — HYDROCHLOROTHIAZIDE 25 MG/1
25 TABLET ORAL EVERY MORNING
Qty: 30 TABLET | Refills: 5 | Status: SHIPPED | OUTPATIENT
Start: 2024-01-10 | End: 2024-01-10 | Stop reason: SDUPTHER

## 2024-01-10 ASSESSMENT — ENCOUNTER SYMPTOMS
COUGH: 0
SINUS PRESSURE: 1
SHORTNESS OF BREATH: 0
TROUBLE SWALLOWING: 0
SORE THROAT: 0

## 2024-01-10 ASSESSMENT — PATIENT HEALTH QUESTIONNAIRE - PHQ9
SUM OF ALL RESPONSES TO PHQ QUESTIONS 1-9: 0
SUM OF ALL RESPONSES TO PHQ QUESTIONS 1-9: 0
1. LITTLE INTEREST OR PLEASURE IN DOING THINGS: 0
SUM OF ALL RESPONSES TO PHQ QUESTIONS 1-9: 0
SUM OF ALL RESPONSES TO PHQ QUESTIONS 1-9: 0
SUM OF ALL RESPONSES TO PHQ9 QUESTIONS 1 & 2: 0
2. FEELING DOWN, DEPRESSED OR HOPELESS: 0

## 2024-01-10 NOTE — PROGRESS NOTES
I have reviewed all needed documentation in preparation for visit. Verified patient by name and date of birth  Chief Complaint   Patient presents with    Follow-up     F/U on headaches.        There were no vitals filed for this visit.    Health Maintenance Due   Topic Date Due    Diabetic retinal exam  Never done    Shingles vaccine (1 of 2) Never done    Respiratory Syncytial Virus (RSV) Pregnant or age 60 yrs+ (1 - 1-dose 60+ series) Never done    DTaP/Tdap/Td vaccine (2 - Td or Tdap) 09/13/2019    Diabetic foot exam  08/01/2022    COVID-19 Vaccine (4 - 2023-24 season) 09/01/2023    Annual Wellness Visit (Medicare Advantage)  01/01/2024       1. \"Have you been to the ER, urgent care clinic since your last visit?  Hospitalized since your last visit?\" No    2. \"Have you seen or consulted any other health care providers outside of the [facility] since your last visit?\" No    3. For patients aged 45-75: Has the patient had a colonoscopy / FIT/ Cologuard? Yes, up to date.       If the patient is female:    4. For patients aged 40-74: Has the patient had a mammogram within the past 2 years? Yes, up to date.       5. For patients aged 21-65: Has the patient had a pap smear? MARIA A Felipe   
Thought Content: Thought content normal.         Judgment: Judgment normal.       ASSESSMENT/PLAN    ICD-10-CM    1. Essential hypertension  I10 Overcontrolled. Stop Triamterene HCTZ.   StarthydroCHLOROthiazide (HYDRODIURIL) 25 MG tablet      2. Type 2 diabetes mellitus with diabetic polyneuropathy, without long-term current use of insulin (Former[facility])  E11.42 Hemoglobin A1C     Comprehensive Metabolic Panel      3. Chronic nonintractable headache, unspecified headache type  R51.9 CBC with Auto Differential    G89.29    4. Allergic Rhinitis - Demonstrated Flonase technique, and pt notes that current technique is not correct - pt will work on this.  Continue Flonase and Zyrtec.

## 2024-01-11 LAB
ALBUMIN SERPL-MCNC: 4.1 G/DL (ref 3.5–5)
ALBUMIN/GLOB SERPL: 1.3 (ref 1.1–2.2)
ALP SERPL-CCNC: 52 U/L (ref 45–117)
ALT SERPL-CCNC: 18 U/L (ref 12–78)
ANION GAP SERPL CALC-SCNC: 4 MMOL/L (ref 5–15)
AST SERPL-CCNC: 14 U/L (ref 15–37)
BASOPHILS # BLD: 0 K/UL (ref 0–0.1)
BASOPHILS NFR BLD: 1 % (ref 0–1)
BILIRUB SERPL-MCNC: 0.4 MG/DL (ref 0.2–1)
BUN SERPL-MCNC: 20 MG/DL (ref 6–20)
BUN/CREAT SERPL: 17 (ref 12–20)
CALCIUM SERPL-MCNC: 9.9 MG/DL (ref 8.5–10.1)
CHLORIDE SERPL-SCNC: 107 MMOL/L (ref 97–108)
CO2 SERPL-SCNC: 30 MMOL/L (ref 21–32)
CREAT SERPL-MCNC: 1.15 MG/DL (ref 0.55–1.02)
DIFFERENTIAL METHOD BLD: ABNORMAL
EOSINOPHIL # BLD: 0.2 K/UL (ref 0–0.4)
EOSINOPHIL NFR BLD: 4 % (ref 0–7)
ERYTHROCYTE [DISTWIDTH] IN BLOOD BY AUTOMATED COUNT: 15.2 % (ref 11.5–14.5)
EST. AVERAGE GLUCOSE BLD GHB EST-MCNC: 117 MG/DL
GLOBULIN SER CALC-MCNC: 3.1 G/DL (ref 2–4)
GLUCOSE SERPL-MCNC: 116 MG/DL (ref 65–100)
HBA1C MFR BLD: 5.7 % (ref 4–5.6)
HCT VFR BLD AUTO: 40.2 % (ref 35–47)
HGB BLD-MCNC: 12.9 G/DL (ref 11.5–16)
IMM GRANULOCYTES # BLD AUTO: 0 K/UL (ref 0–0.04)
IMM GRANULOCYTES NFR BLD AUTO: 0 % (ref 0–0.5)
LYMPHOCYTES # BLD: 2.7 K/UL (ref 0.8–3.5)
LYMPHOCYTES NFR BLD: 42 % (ref 12–49)
MCH RBC QN AUTO: 25.9 PG (ref 26–34)
MCHC RBC AUTO-ENTMCNC: 32.1 G/DL (ref 30–36.5)
MCV RBC AUTO: 80.7 FL (ref 80–99)
MONOCYTES # BLD: 0.4 K/UL (ref 0–1)
MONOCYTES NFR BLD: 6 % (ref 5–13)
NEUTS SEG # BLD: 3.1 K/UL (ref 1.8–8)
NEUTS SEG NFR BLD: 47 % (ref 32–75)
NRBC # BLD: 0 K/UL (ref 0–0.01)
NRBC BLD-RTO: 0 PER 100 WBC
PLATELET # BLD AUTO: 279 K/UL (ref 150–400)
PMV BLD AUTO: 11 FL (ref 8.9–12.9)
POTASSIUM SERPL-SCNC: 3.6 MMOL/L (ref 3.5–5.1)
PROT SERPL-MCNC: 7.2 G/DL (ref 6.4–8.2)
RBC # BLD AUTO: 4.98 M/UL (ref 3.8–5.2)
SODIUM SERPL-SCNC: 141 MMOL/L (ref 136–145)
WBC # BLD AUTO: 6.4 K/UL (ref 3.6–11)

## 2024-01-24 ENCOUNTER — OFFICE VISIT (OUTPATIENT)
Age: 74
End: 2024-01-24
Payer: MEDICARE

## 2024-01-24 VITALS
WEIGHT: 132.8 LBS | SYSTOLIC BLOOD PRESSURE: 122 MMHG | OXYGEN SATURATION: 98 % | TEMPERATURE: 98.1 F | DIASTOLIC BLOOD PRESSURE: 67 MMHG | RESPIRATION RATE: 18 BRPM | BODY MASS INDEX: 23.53 KG/M2 | HEIGHT: 63 IN | HEART RATE: 77 BPM

## 2024-01-24 DIAGNOSIS — E11.42 TYPE 2 DIABETES MELLITUS WITH DIABETIC POLYNEUROPATHY, WITHOUT LONG-TERM CURRENT USE OF INSULIN (HCC): ICD-10-CM

## 2024-01-24 DIAGNOSIS — I10 ESSENTIAL HYPERTENSION: ICD-10-CM

## 2024-01-24 DIAGNOSIS — M85.89 OSTEOPENIA OF MULTIPLE SITES: ICD-10-CM

## 2024-01-24 DIAGNOSIS — J01.90 ACUTE NON-RECURRENT SINUSITIS, UNSPECIFIED LOCATION: ICD-10-CM

## 2024-01-24 DIAGNOSIS — J30.9 ALLERGIC RHINITIS, UNSPECIFIED SEASONALITY, UNSPECIFIED TRIGGER: ICD-10-CM

## 2024-01-24 DIAGNOSIS — Z00.00 MEDICARE ANNUAL WELLNESS VISIT, SUBSEQUENT: Primary | ICD-10-CM

## 2024-01-24 DIAGNOSIS — E78.00 PURE HYPERCHOLESTEROLEMIA: ICD-10-CM

## 2024-01-24 PROCEDURE — G8427 DOCREV CUR MEDS BY ELIG CLIN: HCPCS | Performed by: PHYSICIAN ASSISTANT

## 2024-01-24 PROCEDURE — 1090F PRES/ABSN URINE INCON ASSESS: CPT | Performed by: PHYSICIAN ASSISTANT

## 2024-01-24 PROCEDURE — 1123F ACP DISCUSS/DSCN MKR DOCD: CPT | Performed by: PHYSICIAN ASSISTANT

## 2024-01-24 PROCEDURE — 3044F HG A1C LEVEL LT 7.0%: CPT | Performed by: PHYSICIAN ASSISTANT

## 2024-01-24 PROCEDURE — 1036F TOBACCO NON-USER: CPT | Performed by: PHYSICIAN ASSISTANT

## 2024-01-24 PROCEDURE — 3074F SYST BP LT 130 MM HG: CPT | Performed by: PHYSICIAN ASSISTANT

## 2024-01-24 PROCEDURE — G8399 PT W/DXA RESULTS DOCUMENT: HCPCS | Performed by: PHYSICIAN ASSISTANT

## 2024-01-24 PROCEDURE — 99214 OFFICE O/P EST MOD 30 MIN: CPT | Performed by: PHYSICIAN ASSISTANT

## 2024-01-24 PROCEDURE — 2022F DILAT RTA XM EVC RTNOPTHY: CPT | Performed by: PHYSICIAN ASSISTANT

## 2024-01-24 PROCEDURE — G8483 FLU IMM NO ADMIN DOC REA: HCPCS | Performed by: PHYSICIAN ASSISTANT

## 2024-01-24 PROCEDURE — 3017F COLORECTAL CA SCREEN DOC REV: CPT | Performed by: PHYSICIAN ASSISTANT

## 2024-01-24 PROCEDURE — 3078F DIAST BP <80 MM HG: CPT | Performed by: PHYSICIAN ASSISTANT

## 2024-01-24 PROCEDURE — G8420 CALC BMI NORM PARAMETERS: HCPCS | Performed by: PHYSICIAN ASSISTANT

## 2024-01-24 PROCEDURE — G0439 PPPS, SUBSEQ VISIT: HCPCS | Performed by: PHYSICIAN ASSISTANT

## 2024-01-24 RX ORDER — ATORVASTATIN CALCIUM 20 MG/1
20 TABLET, FILM COATED ORAL DAILY
Qty: 90 TABLET | Refills: 1 | Status: SHIPPED | OUTPATIENT
Start: 2024-01-24

## 2024-01-24 RX ORDER — METHYLPREDNISOLONE 4 MG/1
TABLET ORAL
Qty: 1 KIT | Refills: 0 | Status: SHIPPED | OUTPATIENT
Start: 2024-01-24

## 2024-01-24 RX ORDER — PSEUDOEPHEDRINE HCL 30 MG
250 TABLET ORAL DAILY
COMMUNITY
Start: 2024-01-24

## 2024-01-24 RX ORDER — AMOXICILLIN AND CLAVULANATE POTASSIUM 875; 125 MG/1; MG/1
1 TABLET, FILM COATED ORAL 2 TIMES DAILY
Qty: 20 TABLET | Refills: 0 | Status: SHIPPED | OUTPATIENT
Start: 2024-01-24 | End: 2024-02-03

## 2024-01-24 ASSESSMENT — PATIENT HEALTH QUESTIONNAIRE - PHQ9
2. FEELING DOWN, DEPRESSED OR HOPELESS: 0
1. LITTLE INTEREST OR PLEASURE IN DOING THINGS: 0
SUM OF ALL RESPONSES TO PHQ QUESTIONS 1-9: 0
SUM OF ALL RESPONSES TO PHQ9 QUESTIONS 1 & 2: 0
SUM OF ALL RESPONSES TO PHQ QUESTIONS 1-9: 0

## 2024-01-24 ASSESSMENT — ENCOUNTER SYMPTOMS
SHORTNESS OF BREATH: 0
SINUS PRESSURE: 1
COUGH: 0
RHINORRHEA: 1

## 2024-01-24 ASSESSMENT — LIFESTYLE VARIABLES
HOW OFTEN DO YOU HAVE A DRINK CONTAINING ALCOHOL: NEVER
HOW MANY STANDARD DRINKS CONTAINING ALCOHOL DO YOU HAVE ON A TYPICAL DAY: PATIENT DOES NOT DRINK

## 2024-01-24 NOTE — PROGRESS NOTES
Medicare Annual Wellness Visit    Josie Palumbo is here for Follow-up and Hypertension (Follow up on Hypertension and has questions about vitamins. )    Assessment & Plan   Medicare annual wellness visit, subsequent  Recommendations for Preventive Services Due: see orders and patient instructions/AVS.  Recommended screening schedule for the next 5-10 years is provided to the patient in written form: see Patient Instructions/AVS.     No follow-ups on file.     Subjective     Patient's complete Health Risk Assessment and screening values have been reviewed and are found in Flowsheets. The following problems were reviewed today and where indicated follow up appointments were made and/or referrals ordered.    Positive Risk Factor Screenings with Interventions:                    Safety:  Do you have non-slip mats or non-slip surfaces or shower bars or grab bars in your shower or bathtub?: (!) No  Interventions:  Discussed safety measures     Advanced Directives:  Do you have a Living Will?: (!) No    Intervention:  Pt does have one on file. Reviewed.                Objective   Vitals:    01/24/24 0952   BP: 122/67   Site: Left Upper Arm   Position: Sitting   Cuff Size: Small Adult   Pulse: 77   Resp: 18   Temp: 98.1 °F (36.7 °C)   TempSrc: Temporal   SpO2: 98%   Weight: 60.2 kg (132 lb 12.8 oz)   Height: 1.6 m (5' 3\")      Body mass index is 23.52 kg/m².              No Known Allergies  Prior to Visit Medications    Medication Sig Taking? Authorizing Provider   hydroCHLOROthiazide (HYDRODIURIL) 25 MG tablet Take 1 tablet by mouth every morning Yes Kinsey Stein PA-C   cetirizine (ZYRTEC) 10 MG tablet Take 1 tablet by mouth nightly Yes Kinsey Stein PA-C   atorvastatin (LIPITOR) 20 MG tablet Take 1 tablet by mouth daily Yes Kinsey Stein PA-C   Cholecalciferol 1.25 MG (39110 UT) TABS Take 5,000 mcg by mouth daily Yes Provider, MD Lina   fluticasone (FLONASE) 50 MCG/ACT nasal spray 2 sprays by Each

## 2024-01-24 NOTE — PROGRESS NOTES
I have reviewed all needed documentation in preparation for visit. Verified patient by name and date of birth  Chief Complaint   Patient presents with    Follow-up    Hypertension     Follow up on Hypertension and has questions about vitamins.        There were no vitals filed for this visit.    Health Maintenance Due   Topic Date Due    Diabetic retinal exam  Never done    Shingles vaccine (1 of 2) Never done    Respiratory Syncytial Virus (RSV) Pregnant or age 60 yrs+ (1 - 1-dose 60+ series) Never done    DTaP/Tdap/Td vaccine (2 - Td or Tdap) 09/13/2019    Diabetic foot exam  08/01/2022    COVID-19 Vaccine (4 - 2023-24 season) 09/01/2023    Annual Wellness Visit (Medicare Advantage)  01/01/2024    Breast cancer screen  02/23/2024       1. \"Have you been to the ER, urgent care clinic since your last visit?  Hospitalized since your last visit?\" No    2. \"Have you seen or consulted any other health care providers outside of the [facility] since your last visit?\" No    3. For patients aged 45-75: Has the patient had a colonoscopy / FIT/ Cologuard? Yes, up to date.       If the patient is female:    4. For patients aged 40-74: Has the patient had a mammogram within the past 2 years? Yes, up to date.       5. For patients aged 21-65: Has the patient had a pap smear? MARIA A Felipe

## 2024-01-24 NOTE — PROGRESS NOTES
Josie Palumbo is a 73 y.o. female  Chief Complaint   Patient presents with    Follow-up    Hypertension     Follow up on Hypertension and has questions about vitamins.      Vitals:    01/24/24 0952   BP: 122/67   Pulse: 77   Resp: 18   Temp: 98.1 °F (36.7 °C)   SpO2: 98%      Wt Readings from Last 3 Encounters:   01/24/24 60.2 kg (132 lb 12.8 oz)   01/10/24 60 kg (132 lb 3.2 oz)   12/20/23 61.7 kg (136 lb)     BMI Readings from Last 3 Encounters:   01/24/24 23.52 kg/m²   01/10/24 23.42 kg/m²   12/20/23 24.09 kg/m²     Health Maintenance Due   Topic Date Due    Diabetic retinal exam  Never done    Shingles vaccine (1 of 2) Never done    Respiratory Syncytial Virus (RSV) Pregnant or age 60 yrs+ (1 - 1-dose 60+ series) Never done    DTaP/Tdap/Td vaccine (2 - Td or Tdap) 09/13/2019    Diabetic foot exam  08/01/2022    COVID-19 Vaccine (4 - 2023-24 season) 09/01/2023    Breast cancer screen  02/23/2024     HPI  Headaches Persist x 2 months now - started taking Flonase & Zyrtec with good technique.  Sinus headache sometimes causing lightheadedness. No vision change. UTD with dentist and eye doctor.     CV follow up  BP controlled:  BP Readings from Last 3 Encounters:   01/24/24 122/67   01/10/24 114/63   12/20/23 125/73     Currently taking:   Key Anti-Hypertensive Meds            hydroCHLOROthiazide (HYDRODIURIL) 25 MG tablet (Taking)    Sig - Route: Take 1 tablet by mouth every morning - Oral    Notes to Pharmacy: Please remove Triamterene/HCTZ from pt's med list. Thanks!            Creatinine (MG/DL)   Date Value   01/10/2024 1.15 (H)      Cholesterol Meds  atorvastatin - 20 MG  LDL Calculated (MG/DL)   Date Value   08/28/2023 66     LDL controlled at last check.     DM II - controlled. Feeling well.    Hemoglobin A1C (%)   Date Value   01/10/2024 5.7 (H)     LDL Calculated (MG/DL)   Date Value   08/28/2023 66     Creatinine (MG/DL)   Date Value   01/10/2024 1.15 (H)       Currently taking:   Key Antihyperglycemic

## 2024-01-29 ENCOUNTER — TELEPHONE (OUTPATIENT)
Age: 74
End: 2024-01-29

## 2024-01-29 DIAGNOSIS — M85.89 OSTEOPENIA OF MULTIPLE SITES: ICD-10-CM

## 2024-01-29 RX ORDER — PSEUDOEPHEDRINE HCL 30 MG
250 TABLET ORAL DAILY
Qty: 90 TABLET | Refills: 1 | Status: SHIPPED | OUTPATIENT
Start: 2024-01-29

## 2024-01-29 NOTE — TELEPHONE ENCOUNTER
Please inform pt that I sent an Rx for this to her [facility] and wrote a note to her pharmacist asking them to help her find the OTC version if the Rx version isn't covered by her insurance.

## 2024-01-29 NOTE — TELEPHONE ENCOUNTER
Pt would like to know what she can take instead of the following medication      calcium citrate (CALCITRATE) 250 MG TABS tablet     Pt states that she can only find 400MG plus D3. She would also like to know if she should still take     calcium citrate (CALCITRATE) 250 MG TABS tablet     Would like to know if that would be to much for her and she is also taking Women's Multi Vitamin 50 Plus. Pt would like a call back on this matter as soon as possible.

## 2024-02-02 ENCOUNTER — TELEPHONE (OUTPATIENT)
Age: 74
End: 2024-02-02

## 2024-02-02 NOTE — TELEPHONE ENCOUNTER
Please inform pt that she could also get the Calcium chews OTC and it's fine to go up to 500 mg twice daily and it's also fine to take the ones that also include Vitamin D.

## 2024-02-02 NOTE — TELEPHONE ENCOUNTER
----- Message from Leola Mitra sent at 1/31/2024 11:53 AM EST -----  Subject: Message to Provider    QUESTIONS  Information for Provider? Pt would like a call back regarding her   vitamins, she is currently on her way out and would like for someone to   leave a detailed message regarding these vitamins on her home answering   machine.   ---------------------------------------------------------------------------  --------------  CALL BACK INFO  [phone]; OK to leave message on voicemail  ---------------------------------------------------------------------------  --------------  SCRIPT ANSWERS  Relationship to Patient? Self

## 2024-02-02 NOTE — TELEPHONE ENCOUNTER
Patient called about getting a smaller calcium pill as she is having a hard time swallowing them even when crushed. I did tell her that her pharmacist would be able to help her with that as well.     Patient verified using two identifiers.    MARIA A Coker

## 2024-02-02 NOTE — TELEPHONE ENCOUNTER
I think this is about her Calcium supplement:     Please inform pt that I sent an Rx [facility] and wrote a note to her pharmacist asking them to help her find the OTC version if the Rx version isn't covered by her insurance.

## 2024-02-09 NOTE — TELEPHONE ENCOUNTER
Called patient, to relay Kinsey's message about her calcium supplement.  Patient did not answer so a voice mail was left asking patient to call the office back at her earliest convenience, also informed her that her message was also relayed via a [employer] message and that if she had any other questions to contact the office.     Nitesh DE JESUS LPN

## 2024-02-26 ENCOUNTER — HOSPITAL ENCOUNTER (OUTPATIENT)
Facility: HOSPITAL | Age: 74
Discharge: HOME | End: 2024-02-29
Payer: MEDICARE

## 2024-02-26 VITALS — HEIGHT: 64 IN | WEIGHT: 130 LBS | BODY MASS INDEX: 22.2 KG/M2

## 2024-02-26 DIAGNOSIS — Z12.31 VISIT FOR SCREENING MAMMOGRAM: ICD-10-CM

## 2024-02-26 PROCEDURE — 77063 BREAST TOMOSYNTHESIS BI: CPT

## 2024-04-15 ENCOUNTER — OFFICE VISIT (OUTPATIENT)
Age: 74
End: 2024-04-15
Payer: MEDICARE

## 2024-04-15 ENCOUNTER — TELEPHONE (OUTPATIENT)
Age: 74
End: 2024-04-15

## 2024-04-15 VITALS
SYSTOLIC BLOOD PRESSURE: 148 MMHG | WEIGHT: 138 LBS | HEIGHT: 64 IN | OXYGEN SATURATION: 99 % | RESPIRATION RATE: 18 BRPM | HEART RATE: 90 BPM | TEMPERATURE: 97.8 F | BODY MASS INDEX: 23.56 KG/M2 | DIASTOLIC BLOOD PRESSURE: 72 MMHG

## 2024-04-15 DIAGNOSIS — Z79.899 CONTROLLED SUBSTANCE AGREEMENT SIGNED: ICD-10-CM

## 2024-04-15 DIAGNOSIS — G89.29 CHRONIC HIP PAIN, LEFT: Primary | ICD-10-CM

## 2024-04-15 DIAGNOSIS — M25.552 CHRONIC HIP PAIN, LEFT: Primary | ICD-10-CM

## 2024-04-15 PROCEDURE — 1036F TOBACCO NON-USER: CPT | Performed by: PHYSICIAN ASSISTANT

## 2024-04-15 PROCEDURE — 99214 OFFICE O/P EST MOD 30 MIN: CPT | Performed by: PHYSICIAN ASSISTANT

## 2024-04-15 PROCEDURE — 1090F PRES/ABSN URINE INCON ASSESS: CPT | Performed by: PHYSICIAN ASSISTANT

## 2024-04-15 PROCEDURE — 3077F SYST BP >= 140 MM HG: CPT | Performed by: PHYSICIAN ASSISTANT

## 2024-04-15 PROCEDURE — G8399 PT W/DXA RESULTS DOCUMENT: HCPCS | Performed by: PHYSICIAN ASSISTANT

## 2024-04-15 PROCEDURE — G8420 CALC BMI NORM PARAMETERS: HCPCS | Performed by: PHYSICIAN ASSISTANT

## 2024-04-15 PROCEDURE — G2211 COMPLEX E/M VISIT ADD ON: HCPCS | Performed by: PHYSICIAN ASSISTANT

## 2024-04-15 PROCEDURE — 3017F COLORECTAL CA SCREEN DOC REV: CPT | Performed by: PHYSICIAN ASSISTANT

## 2024-04-15 PROCEDURE — 1123F ACP DISCUSS/DSCN MKR DOCD: CPT | Performed by: PHYSICIAN ASSISTANT

## 2024-04-15 PROCEDURE — 3078F DIAST BP <80 MM HG: CPT | Performed by: PHYSICIAN ASSISTANT

## 2024-04-15 PROCEDURE — G8427 DOCREV CUR MEDS BY ELIG CLIN: HCPCS | Performed by: PHYSICIAN ASSISTANT

## 2024-04-15 RX ORDER — MELOXICAM 15 MG/1
15 TABLET ORAL DAILY PRN
Qty: 90 TABLET | Refills: 0 | Status: SHIPPED | OUTPATIENT
Start: 2024-04-15 | End: 2024-04-19

## 2024-04-15 RX ORDER — TRAMADOL HYDROCHLORIDE 50 MG/1
50 TABLET ORAL
Qty: 5 TABLET | Refills: 0 | Status: SHIPPED | OUTPATIENT
Start: 2024-04-15 | End: 2024-04-20

## 2024-04-15 RX ORDER — METHOCARBAMOL 500 MG/1
TABLET, FILM COATED ORAL
COMMUNITY
Start: 2024-04-12 | End: 2024-04-15

## 2024-04-15 RX ORDER — ACETAMINOPHEN 500 MG
1000 TABLET ORAL 3 TIMES DAILY PRN
COMMUNITY
Start: 2024-04-15

## 2024-04-15 ASSESSMENT — ENCOUNTER SYMPTOMS
SHORTNESS OF BREATH: 0
COUGH: 0

## 2024-04-15 NOTE — PROGRESS NOTES
Josie Palumbo is a 73 y.o. female  Chief Complaint   Patient presents with    Hip Pain     Start 4 days ago- left hip pain- can't sleep for the last 3 nights-      Vitals:    04/15/24 1451   BP: (!) 148/72   Pulse: 90   Resp: 18   Temp: 97.8 °F (36.6 °C)   SpO2: 99%      Wt Readings from Last 3 Encounters:   04/15/24 62.6 kg (138 lb)   02/26/24 59 kg (130 lb)   01/24/24 60.2 kg (132 lb 12.8 oz)     BMI Readings from Last 3 Encounters:   04/15/24 23.69 kg/m²   02/26/24 22.31 kg/m²   01/24/24 23.52 kg/m²     Health Maintenance Due   Topic Date Due    Diabetic retinal exam  Never done    Shingles vaccine (1 of 2) Never done    Respiratory Syncytial Virus (RSV) Pregnant or age 60 yrs+ (1 - 1-dose 60+ series) Never done    DTaP/Tdap/Td vaccine (2 - Td or Tdap) 09/13/2019    Diabetic foot exam  08/01/2022    COVID-19 Vaccine (4 - 2023-24 season) 09/01/2023     HPI  L hip pain with difficulty walking x 1 month. Getting worse.   Demetria to [facility] last week: xray showed L hip arthritis and gave methocarbamol and steroid pack. Pt has not been taking Steroid pack as directed due to confusion with dosing instructions. Methocarbamol didn't help so she stopped taking this.     ROS  Review of Systems   Constitutional:  Negative for fever.   Respiratory:  Negative for cough and shortness of breath.    Cardiovascular:  Negative for chest pain and palpitations.   Neurological:  Negative for headaches.   Psychiatric/Behavioral:  Negative for dysphoric mood.      EXAM  Physical Exam  Vitals and nursing note reviewed.   Constitutional:       Appearance: Normal appearance.   HENT:      Head: Normocephalic and atraumatic.   Neck:      Vascular: No carotid bruit.   Cardiovascular:      Rate and Rhythm: Normal rate and regular rhythm.      Heart sounds: Normal heart sounds.   Pulmonary:      Effort: Pulmonary effort is normal. No respiratory distress.      Breath sounds: Normal breath sounds.   Musculoskeletal:         General: No deformity.

## 2024-04-15 NOTE — PROGRESS NOTES
I have reviewed all needed documentation in preparation for visit. Verified patient by name and date of birth  Chief Complaint   Patient presents with    Hip Pain     Start 4 days ago- left hip pain- can't sleep for the last 3 nights-        Vitals:    04/15/24 1451   BP: (!) 148/72   Site: Right Upper Arm   Position: Sitting   Cuff Size: Medium Adult   Pulse: 90   Resp: 18   Temp: 97.8 °F (36.6 °C)   TempSrc: Temporal   SpO2: 99%   Weight: 62.6 kg (138 lb)   Height: 1.626 m (5' 4\")       Health Maintenance Due   Topic Date Due    Diabetic retinal exam  Never done    Shingles vaccine (1 of 2) Never done    Respiratory Syncytial Virus (RSV) Pregnant or age 60 yrs+ (1 - 1-dose 60+ series) Never done    DTaP/Tdap/Td vaccine (2 - Td or Tdap) 09/13/2019    Diabetic foot exam  08/01/2022    COVID-19 Vaccine (4 - 2023-24 season) 09/01/2023     \"Have you been to the ER, urgent care clinic since your last visit?  Hospitalized since your last visit?\"    YES    “Have you seen or consulted any other health care providers outside of [facility] since your last visit?”    YES on Friday             Click Here for Release of Records Request         Bree bass CMA

## 2024-04-15 NOTE — TELEPHONE ENCOUNTER
----- Message from Debra Rosenberg sent at 4/12/2024  1:00 PM EDT -----  Subject: Message to Provider    QUESTIONS  Information for Provider? Pt called to let office know that [facility]. They ordered her two meds and wanted to check   to see if she can take the two meds. Pt was prescribed Methocardamol 500   mg and Methyiprednisone 4 mg. Please advise. Pt waas diagnosed with   arthritis   ---------------------------------------------------------------------------  --------------  CALL BACK INFO  [phone]; OK to leave message on voicemail  ---------------------------------------------------------------------------  --------------  SCRIPT ANSWERS  Relationship to Patient? Self   .

## 2024-04-15 NOTE — TELEPHONE ENCOUNTER
Called and spoke with pt.  Verified identity x2.   Relayed Kinsey's message to pt  Pt verbalized understanding  Pt states she stopped taking the meds because they don't seem to be helping  Pt has an appointment this afternoon    Susy Wilson LPN

## 2024-04-19 ENCOUNTER — HOSPITAL ENCOUNTER (EMERGENCY)
Facility: HOSPITAL | Age: 74
Discharge: HOME | End: 2024-04-19
Attending: EMERGENCY MEDICINE
Payer: MEDICARE

## 2024-04-19 ENCOUNTER — APPOINTMENT (OUTPATIENT)
Facility: HOSPITAL | Age: 74
End: 2024-04-19
Payer: MEDICARE

## 2024-04-19 VITALS
WEIGHT: 138 LBS | HEART RATE: 79 BPM | SYSTOLIC BLOOD PRESSURE: 166 MMHG | TEMPERATURE: 98.7 F | RESPIRATION RATE: 16 BRPM | HEIGHT: 64 IN | BODY MASS INDEX: 23.56 KG/M2 | DIASTOLIC BLOOD PRESSURE: 94 MMHG | OXYGEN SATURATION: 98 %

## 2024-04-19 DIAGNOSIS — M51.36 DDD (DEGENERATIVE DISC DISEASE), LUMBAR: Primary | ICD-10-CM

## 2024-04-19 PROCEDURE — 72100 X-RAY EXAM L-S SPINE 2/3 VWS: CPT

## 2024-04-19 PROCEDURE — 96372 THER/PROPH/DIAG INJ SC/IM: CPT

## 2024-04-19 PROCEDURE — 99284 EMERGENCY DEPT VISIT MOD MDM: CPT

## 2024-04-19 PROCEDURE — 6360000002 HC RX W HCPCS: Performed by: EMERGENCY MEDICINE

## 2024-04-19 RX ORDER — KETOROLAC TROMETHAMINE 10 MG/1
10 TABLET, FILM COATED ORAL EVERY 6 HOURS PRN
Qty: 20 TABLET | Refills: 0 | Status: SHIPPED | OUTPATIENT
Start: 2024-04-19 | End: 2024-04-24

## 2024-04-19 RX ORDER — KETOROLAC TROMETHAMINE 30 MG/ML
30 INJECTION, SOLUTION INTRAMUSCULAR; INTRAVENOUS
Status: COMPLETED | OUTPATIENT
Start: 2024-04-19 | End: 2024-04-19

## 2024-04-19 RX ADMIN — KETOROLAC TROMETHAMINE 30 MG: 30 INJECTION, SOLUTION INTRAMUSCULAR at 07:22

## 2024-04-19 ASSESSMENT — PAIN - FUNCTIONAL ASSESSMENT: PAIN_FUNCTIONAL_ASSESSMENT: 0-10

## 2024-04-19 ASSESSMENT — LIFESTYLE VARIABLES
HOW MANY STANDARD DRINKS CONTAINING ALCOHOL DO YOU HAVE ON A TYPICAL DAY: PATIENT DOES NOT DRINK
HOW OFTEN DO YOU HAVE A DRINK CONTAINING ALCOHOL: NEVER

## 2024-04-19 ASSESSMENT — PAIN DESCRIPTION - LOCATION: LOCATION: BACK;HIP

## 2024-04-19 ASSESSMENT — ENCOUNTER SYMPTOMS
VOMITING: 0
COUGH: 0
SORE THROAT: 0

## 2024-04-19 ASSESSMENT — PAIN DESCRIPTION - ORIENTATION: ORIENTATION: MID;LOWER;LEFT

## 2024-04-19 ASSESSMENT — PAIN DESCRIPTION - PAIN TYPE: TYPE: ACUTE PAIN

## 2024-04-19 ASSESSMENT — PAIN SCALES - GENERAL: PAINLEVEL_OUTOF10: 10

## 2024-04-19 ASSESSMENT — PAIN DESCRIPTION - DESCRIPTORS: DESCRIPTORS: THROBBING

## 2024-04-19 NOTE — ED TRIAGE NOTES
Onset of low mid back pain to the left hip to the thigh about a week ago, no known injury or cause, seen at [facility], sent to [facility].xrays done and taking tylenol, meloxicam with no relief, last dose of tylenol 4pm yesterday evening. Pt is ambulatory with steady gait. Last dose of meloxicam yesterday at 2pm.  Pt now states she took a steroid dose pack given to her by [facility] last week

## 2024-04-19 NOTE — ED PROVIDER NOTES
[facility] [place]  EMERGENCY DEPARTMENT ENCOUNTER      Pt Name: Josie Palumbo  MRN: [id number]  Birthdate 1950  Date of evaluation: 4/19/2024  Provider: Seferino Shirley MD    CHIEF COMPLAINT       Chief Complaint   Patient presents with    Back Pain    Hip Pain         HISTORY OF PRESENT ILLNESS   (Location/Symptom, Timing/Onset, Context/Setting, Quality, Duration, Modifying Factors, Severity)  Note limiting factors.   73-year-old female presents from home with complaints of low back pain.  Pain located across her lower back with radiation into her left thigh.  Pains been present for the past week.  Denies any trauma or injury.  No urinary symptoms.  No focal numbness or weakness.  Patient states that she had x-rays at [facility] And [facility] Dosepak, meloxicam, tramadol.    The history is provided by the patient.         Review of External Medical Records:     Nursing Notes were reviewed.    REVIEW OF SYSTEMS    (2-9 systems for level 4, 10 or more for level 5)     Review of Systems   Constitutional:  Negative for fatigue.   HENT:  Negative for sore throat.    Eyes:  Negative for visual disturbance.   Respiratory:  Negative for cough.    Cardiovascular:  Negative for palpitations.   Gastrointestinal:  Negative for vomiting.   Genitourinary:  Negative for difficulty urinating.   Musculoskeletal:  Negative for myalgias.   Skin:  Negative for rash.   Neurological:  Negative for weakness.       Except as noted above the remainder of the review of systems was reviewed and negative.       PAST MEDICAL HISTORY     Past Medical History:   Diagnosis Date    Allergic rhinitis     Atrophic vaginitis     DM (diabetes mellitus) (HCC)     diet and exercise control    HLD (hyperlipidemia)     Hypertension     Neuropathy     Osteoporosis     meds started 2007         SURGICAL HISTORY       Past Surgical History:   Procedure Laterality Date    COLONOSCOPY N/A 4/18/2022     159/136   Pulse: 79   Resp: 16   Temp: 98.7 °F (37.1 °C)   TempSrc: Oral   SpO2: 98%   Weight: 62.6 kg (138 lb)   Height: 1.626 m (5' 4\")           Medical Decision Making  Assessment: Patient with low back pain for the past couple of weeks.  Vital signs unremarkable except for slightly elevated blood pressure.  X-rays show degenerative disc disease but no evidence of pathologic fractures.  Seen by orthopedics last week and she has been treated with meloxicam, tramadol, Medrol Dosepak.  I recommended stopping the meloxicam and trying Toradol instead.  I also encouraged her to schedule physical therapy as ordered by orthopedics and if she still has not seen improvement she should follow-up with the orthopedist.  She can return to the ED at anytime with new or worsening symptoms.    Amount and/or Complexity of Data Reviewed  Radiology: ordered.    Risk  Prescription drug management.            REASSESSMENT            CONSULTS:  None    PROCEDURES:  Unless otherwise noted below, none     Procedures      FINAL IMPRESSION      1. DDD (degenerative disc disease), lumbar          DISPOSITION/PLAN   DISPOSITION Decision To Discharge 04/19/2024 07:20:27 AM      PATIENT REFERRED TO:  Tim Rodriguez MD  [address] [address] [phone]    Schedule an appointment as soon as possible for a visit       [facility] [place] EMERGENCY DEPT  [address]  [facility] [address]  [phone]    If symptoms worsen      DISCHARGE MEDICATIONS:  New Prescriptions    KETOROLAC (TORADOL) 10 MG TABLET    Take 1 tablet by mouth every 6 hours as needed for Pain         (Please note that portions of this note were completed with a voice recognition program.  Efforts were made to edit the dictations but occasionally words are mis-transcribed.)    Seferino Shirley MD (electronically signed)  Emergency Attending Physician / Physician Assistant / Nurse Practitioner             Seferino Shirley MD  04/19/24 [id number]

## 2024-05-21 ENCOUNTER — TELEPHONE (OUTPATIENT)
Age: 74
End: 2024-05-21

## 2024-05-21 NOTE — TELEPHONE ENCOUNTER
----- Message from Riya Garrett sent at 5/21/2024 10:10 AM EDT -----  Subject: Message to Provider    QUESTIONS  Information for Provider? Patient would like to get bloodwork done before   her appointment on July 8th. Patient stated she needs A1C done and other   bloodwork  ---------------------------------------------------------------------------  --------------  CALL BACK INFO  [phone]; OK to leave message on voicemail,OK to respond with electronic   message via [employer] portal (only for patients who have registered [employer]   account)  ---------------------------------------------------------------------------  --------------  SCRIPT ANSWERS  Relationship to Patient? Self

## 2024-05-22 ENCOUNTER — APPOINTMENT (OUTPATIENT)
Facility: HOSPITAL | Age: 74
End: 2024-05-22
Payer: MEDICARE

## 2024-05-22 ENCOUNTER — HOSPITAL ENCOUNTER (EMERGENCY)
Facility: HOSPITAL | Age: 74
Discharge: HOME | End: 2024-05-22
Attending: STUDENT IN AN ORGANIZED HEALTH CARE EDUCATION/TRAINING PROGRAM
Payer: MEDICARE

## 2024-05-22 VITALS
TEMPERATURE: 97.8 F | SYSTOLIC BLOOD PRESSURE: 151 MMHG | OXYGEN SATURATION: 100 % | HEIGHT: 64 IN | BODY MASS INDEX: 22.53 KG/M2 | RESPIRATION RATE: 20 BRPM | DIASTOLIC BLOOD PRESSURE: 60 MMHG | HEART RATE: 76 BPM | WEIGHT: 132 LBS

## 2024-05-22 DIAGNOSIS — G50.0 TRIGEMINAL NEURALGIA OF RIGHT SIDE OF FACE: Primary | ICD-10-CM

## 2024-05-22 PROCEDURE — 70450 CT HEAD/BRAIN W/O DYE: CPT

## 2024-05-22 PROCEDURE — 6370000000 HC RX 637 (ALT 250 FOR IP): Performed by: STUDENT IN AN ORGANIZED HEALTH CARE EDUCATION/TRAINING PROGRAM

## 2024-05-22 PROCEDURE — 99284 EMERGENCY DEPT VISIT MOD MDM: CPT

## 2024-05-22 RX ORDER — ACETAMINOPHEN 500 MG
1000 TABLET ORAL
Status: COMPLETED | OUTPATIENT
Start: 2024-05-22 | End: 2024-05-22

## 2024-05-22 RX ORDER — IBUPROFEN 600 MG/1
600 TABLET ORAL
Status: COMPLETED | OUTPATIENT
Start: 2024-05-22 | End: 2024-05-22

## 2024-05-22 RX ADMIN — ACETAMINOPHEN 1000 MG: 500 TABLET ORAL at 02:30

## 2024-05-22 RX ADMIN — IBUPROFEN 600 MG: 600 TABLET, FILM COATED ORAL at 02:30

## 2024-05-22 ASSESSMENT — ENCOUNTER SYMPTOMS: SHORTNESS OF BREATH: 0

## 2024-05-22 ASSESSMENT — PAIN SCALES - GENERAL: PAINLEVEL_OUTOF10: 6

## 2024-05-22 NOTE — ED NOTES
Pt discharged to home in [place], states understanding of dc instructions and followup, family member present

## 2024-05-22 NOTE — ED PROVIDER NOTES
[facility] [place]ERGENCY DEPT  EMERGENCY DEPARTMENT ENCOUNTER      Pt Name: Josie Palumbo  MRN: [id number]  Birthdate 1950  Date of evaluation: 5/22/2024  Provider: Eric Jain MD    CHIEF COMPLAINT       Chief Complaint   Patient presents with    Facial Pain         HISTORY OF PRESENT ILLNESS   73-year-old female with history significant for diet-controlled DM, HTN, HLD presents to the ED with chief complaint of right-sided face pain starting tonight that woke her up out of sleep.  Pain began more at the top of her face but is now in her maxillary face and lower face.  It is a shooting pain that waxes and wanes.  Denies history of similar pain.  No treatment prior to coming to the ED.  No vision changes, ear pain, rash, hearing loss, fevers, chills, numbness, weakness, facial droop, speech difficulty, chest pain, or difficulty breathing.    The history is provided by the patient.       Review of External Medical Records:     Nursing Notes were reviewed.    REVIEW OF SYSTEMS       Review of Systems   Respiratory:  Negative for shortness of breath.    Cardiovascular:  Negative for chest pain.       Except as noted above the remainder of the review of systems was reviewed and negative.       PAST MEDICAL HISTORY     Past Medical History:   Diagnosis Date    Allergic rhinitis     Atrophic vaginitis     DM (diabetes mellitus) (HCC)     diet and exercise control    HLD (hyperlipidemia)     Hypertension     Neuropathy     Osteoporosis     meds started 2007         SURGICAL HISTORY       Past Surgical History:   Procedure Laterality Date    COLONOSCOPY N/A 4/18/2022    COLONOSCOPY performed by Rosendo Mcgowan MD at [facility] ENDOSCOPY    COLONOSCOPY  2011         CURRENT MEDICATIONS       Previous Medications    ACETAMINOPHEN (TYLENOL) 500 MG TABLET    Take 2 tablets by mouth 3 times daily as needed for Pain Max: 4,000 mg per day.    ATORVASTATIN (LIPITOR) 20 MG TABLET    Take 1 tablet by mouth daily    CETIRIZINE

## 2024-05-22 NOTE — ED TRIAGE NOTES
Pt ambulatory into ED with cc pain to R side of face. Pt reports pain woke her up from her sleep. Pain includes everything on right side. Pt report hx of degenerative disc disease. Not sure if it is related. Denies pain med PTA. Denies blurred vision. Reports headache and some dizziness but not at this time.

## 2024-05-29 ENCOUNTER — OFFICE VISIT (OUTPATIENT)
Age: 74
End: 2024-05-29
Payer: MEDICARE

## 2024-05-29 VITALS
DIASTOLIC BLOOD PRESSURE: 67 MMHG | TEMPERATURE: 97.9 F | OXYGEN SATURATION: 99 % | HEIGHT: 64 IN | SYSTOLIC BLOOD PRESSURE: 124 MMHG | RESPIRATION RATE: 18 BRPM | WEIGHT: 136.6 LBS | HEART RATE: 68 BPM | BODY MASS INDEX: 23.32 KG/M2

## 2024-05-29 DIAGNOSIS — F43.21 GRIEF: ICD-10-CM

## 2024-05-29 DIAGNOSIS — G89.29 CHRONIC HIP PAIN, LEFT: Primary | ICD-10-CM

## 2024-05-29 DIAGNOSIS — E11.42 TYPE 2 DIABETES MELLITUS WITH DIABETIC POLYNEUROPATHY, WITHOUT LONG-TERM CURRENT USE OF INSULIN (HCC): ICD-10-CM

## 2024-05-29 DIAGNOSIS — R53.83 FATIGUE, UNSPECIFIED TYPE: ICD-10-CM

## 2024-05-29 DIAGNOSIS — G50.0 TRIGEMINAL NEURALGIA: ICD-10-CM

## 2024-05-29 DIAGNOSIS — E55.9 VITAMIN D DEFICIENCY: ICD-10-CM

## 2024-05-29 DIAGNOSIS — M25.552 CHRONIC HIP PAIN, LEFT: Primary | ICD-10-CM

## 2024-05-29 PROCEDURE — G8427 DOCREV CUR MEDS BY ELIG CLIN: HCPCS | Performed by: PHYSICIAN ASSISTANT

## 2024-05-29 PROCEDURE — 2022F DILAT RTA XM EVC RTNOPTHY: CPT | Performed by: PHYSICIAN ASSISTANT

## 2024-05-29 PROCEDURE — G8399 PT W/DXA RESULTS DOCUMENT: HCPCS | Performed by: PHYSICIAN ASSISTANT

## 2024-05-29 PROCEDURE — G8420 CALC BMI NORM PARAMETERS: HCPCS | Performed by: PHYSICIAN ASSISTANT

## 2024-05-29 PROCEDURE — 3044F HG A1C LEVEL LT 7.0%: CPT | Performed by: PHYSICIAN ASSISTANT

## 2024-05-29 PROCEDURE — 99214 OFFICE O/P EST MOD 30 MIN: CPT | Performed by: PHYSICIAN ASSISTANT

## 2024-05-29 PROCEDURE — 3074F SYST BP LT 130 MM HG: CPT | Performed by: PHYSICIAN ASSISTANT

## 2024-05-29 PROCEDURE — 3017F COLORECTAL CA SCREEN DOC REV: CPT | Performed by: PHYSICIAN ASSISTANT

## 2024-05-29 PROCEDURE — 3078F DIAST BP <80 MM HG: CPT | Performed by: PHYSICIAN ASSISTANT

## 2024-05-29 PROCEDURE — G2211 COMPLEX E/M VISIT ADD ON: HCPCS | Performed by: PHYSICIAN ASSISTANT

## 2024-05-29 PROCEDURE — 1090F PRES/ABSN URINE INCON ASSESS: CPT | Performed by: PHYSICIAN ASSISTANT

## 2024-05-29 PROCEDURE — 1036F TOBACCO NON-USER: CPT | Performed by: PHYSICIAN ASSISTANT

## 2024-05-29 PROCEDURE — 1123F ACP DISCUSS/DSCN MKR DOCD: CPT | Performed by: PHYSICIAN ASSISTANT

## 2024-05-29 RX ORDER — MELOXICAM 15 MG/1
15 TABLET ORAL DAILY PRN
Qty: 90 TABLET | Refills: 1 | Status: SHIPPED | OUTPATIENT
Start: 2024-05-29 | End: 2024-05-31 | Stop reason: SDUPTHER

## 2024-05-29 ASSESSMENT — ENCOUNTER SYMPTOMS
COUGH: 0
SHORTNESS OF BREATH: 0

## 2024-05-29 NOTE — PROGRESS NOTES
Josie Palumbo is a 73 y.o. female  Chief Complaint   Patient presents with    Follow-Up from [facility] ER, 5/22, Severe pain on right side, not bad at the moment       Vitals:    05/29/24 0827   BP: 124/67   Pulse: 68   Resp: 18   Temp: 97.9 °F (36.6 °C)   SpO2: 99%      Wt Readings from Last 3 Encounters:   05/29/24 62 kg (136 lb 9.6 oz)   05/22/24 59.9 kg (132 lb)   04/19/24 62.6 kg (138 lb)     BMI Readings from Last 3 Encounters:   05/29/24 23.45 kg/m²   05/22/24 22.66 kg/m²   04/19/24 23.69 kg/m²     Health Maintenance Due   Topic Date Due    Diabetic retinal exam  Never done    Shingles vaccine (1 of 2) Never done    Respiratory Syncytial Virus (RSV) Pregnant or age 60 yrs+ (1 - 1-dose 60+ series) Never done    DTaP/Tdap/Td vaccine (2 - Td or Tdap) 09/13/2019    Diabetic foot exam  08/01/2022    COVID-19 Vaccine (5 - 2023-24 season) 12/02/2023     HPI  ER visit 5/22/24 for Trigeminal neuralgia R face. CT head done: normal. Results reviewed with pt.    Referred to Neurology, but pt is no longer in pain (\"Maybe a little residual discomfort but not pain\") and wonders if she needs to schedule this.     DM II - controlled. Feeling well.    Hemoglobin A1C (%)   Date Value   01/10/2024 5.7 (H)     Lab Results   Component Value Date    LDL 66 08/28/2023     Creatinine (MG/DL)   Date Value   01/10/2024 1.15 (H)       Currently taking no Diabetes Meds     Wt Readings from Last 3 Encounters:   05/29/24 62 kg (136 lb 9.6 oz)   05/22/24 59.9 kg (132 lb)   04/19/24 62.6 kg (138 lb)     Chronic Hip pain. Seeing Ortho regularly. Not taking NSAID regularly. Has a trip planned and worried about how her hip will perform on this trip.     Still grieving loss of , but acute grief is improving. Still fatigued.     ROS  Review of Systems   Constitutional:  Negative for fever.   Respiratory:  Negative for cough and shortness of breath.    Cardiovascular:  Negative for chest pain and palpitations.

## 2024-05-29 NOTE — PROGRESS NOTES
I have reviewed all needed documentation in preparation for visit. Verified patient by name and date of birth    Chief Complaint   Patient presents with    Follow-Up from Hospital     [facility] ER, 5/22, Severe pain on right side, not bad at the moment       \"Have you been to the ER, urgent care clinic since your last visit?  Hospitalized since your last visit?\"    YES - When: approximately 1  weeks ago.  Where and Why: [facility] ER in May for face pain.    “Have you seen or consulted any other health care providers outside of [facility] since your last visit?”    NO            Click Here for Release of Records Request    Vitals:    05/29/24 0827   BP: 124/67   Site: Left Upper Arm   Position: Sitting   Cuff Size: Medium Adult   Pulse: 68   Resp: 18   Temp: 97.9 °F (36.6 °C)   TempSrc: Temporal   SpO2: 99%   Weight: 62 kg (136 lb 9.6 oz)   Height: 1.626 m (5' 4\")       Health Maintenance Due   Topic Date Due    Diabetic retinal exam  Never done    Shingles vaccine (1 of 2) Never done    Respiratory Syncytial Virus (RSV) Pregnant or age 60 yrs+ (1 - 1-dose 60+ series) Never done    DTaP/Tdap/Td vaccine (2 - Td or Tdap) 09/13/2019    Diabetic foot exam  08/01/2022    COVID-19 Vaccine (4 - 2023-24 season) 09/01/2023       Susy Wilson LPN

## 2024-05-30 ENCOUNTER — TELEPHONE (OUTPATIENT)
Age: 74
End: 2024-05-30

## 2024-05-30 DIAGNOSIS — G50.0 TRIGEMINAL NEURALGIA: ICD-10-CM

## 2024-05-30 DIAGNOSIS — G89.29 CHRONIC HIP PAIN, LEFT: ICD-10-CM

## 2024-05-30 DIAGNOSIS — M25.552 CHRONIC HIP PAIN, LEFT: ICD-10-CM

## 2024-05-30 LAB
ALBUMIN SERPL-MCNC: 3.8 G/DL (ref 3.5–5)
ALBUMIN/GLOB SERPL: 1.3 (ref 1.1–2.2)
ALP SERPL-CCNC: 37 U/L (ref 45–117)
ALT SERPL-CCNC: 15 U/L (ref 12–78)
ANION GAP SERPL CALC-SCNC: 4 MMOL/L (ref 5–15)
AST SERPL-CCNC: 15 U/L (ref 15–37)
BASOPHILS # BLD: 0 K/UL (ref 0–0.1)
BASOPHILS NFR BLD: 1 % (ref 0–1)
BILIRUB SERPL-MCNC: 0.3 MG/DL (ref 0.2–1)
BUN SERPL-MCNC: 20 MG/DL (ref 6–20)
BUN/CREAT SERPL: 23 (ref 12–20)
CALCIUM SERPL-MCNC: 9.9 MG/DL (ref 8.5–10.1)
CHLORIDE SERPL-SCNC: 108 MMOL/L (ref 97–108)
CHOLEST SERPL-MCNC: 150 MG/DL
CO2 SERPL-SCNC: 30 MMOL/L (ref 21–32)
CREAT SERPL-MCNC: 0.86 MG/DL (ref 0.55–1.02)
CREAT UR-MCNC: 175 MG/DL
DIFFERENTIAL METHOD BLD: ABNORMAL
EOSINOPHIL # BLD: 0.2 K/UL (ref 0–0.4)
EOSINOPHIL NFR BLD: 5 % (ref 0–7)
ERYTHROCYTE [DISTWIDTH] IN BLOOD BY AUTOMATED COUNT: 15.5 % (ref 11.5–14.5)
EST. AVERAGE GLUCOSE BLD GHB EST-MCNC: 123 MG/DL
GLOBULIN SER CALC-MCNC: 2.9 G/DL (ref 2–4)
GLUCOSE SERPL-MCNC: 104 MG/DL (ref 65–100)
HBA1C MFR BLD: 5.9 % (ref 4–5.6)
HCT VFR BLD AUTO: 35.8 % (ref 35–47)
HDLC SERPL-MCNC: 62 MG/DL
HDLC SERPL: 2.4 (ref 0–5)
HGB BLD-MCNC: 11.3 G/DL (ref 11.5–16)
IMM GRANULOCYTES # BLD AUTO: 0 K/UL (ref 0–0.04)
IMM GRANULOCYTES NFR BLD AUTO: 0 % (ref 0–0.5)
LDLC SERPL CALC-MCNC: 73.8 MG/DL (ref 0–100)
LYMPHOCYTES # BLD: 2 K/UL (ref 0.8–3.5)
LYMPHOCYTES NFR BLD: 43 % (ref 12–49)
MCH RBC QN AUTO: 25.9 PG (ref 26–34)
MCHC RBC AUTO-ENTMCNC: 31.6 G/DL (ref 30–36.5)
MCV RBC AUTO: 81.9 FL (ref 80–99)
MICROALBUMIN UR-MCNC: 1.25 MG/DL
MICROALBUMIN/CREAT UR-RTO: 7 MG/G (ref 0–30)
MONOCYTES # BLD: 0.3 K/UL (ref 0–1)
MONOCYTES NFR BLD: 7 % (ref 5–13)
NEUTS SEG # BLD: 2 K/UL (ref 1.8–8)
NEUTS SEG NFR BLD: 44 % (ref 32–75)
NRBC # BLD: 0 K/UL (ref 0–0.01)
NRBC BLD-RTO: 0 PER 100 WBC
PLATELET # BLD AUTO: 249 K/UL (ref 150–400)
PMV BLD AUTO: 10.6 FL (ref 8.9–12.9)
POTASSIUM SERPL-SCNC: 3.8 MMOL/L (ref 3.5–5.1)
PROT SERPL-MCNC: 6.7 G/DL (ref 6.4–8.2)
RBC # BLD AUTO: 4.37 M/UL (ref 3.8–5.2)
SODIUM SERPL-SCNC: 142 MMOL/L (ref 136–145)
SPECIMEN HOLD: NORMAL
TRIGL SERPL-MCNC: 71 MG/DL
TSH SERPL DL<=0.05 MIU/L-ACNC: 1.56 UIU/ML (ref 0.36–3.74)
VLDLC SERPL CALC-MCNC: 14.2 MG/DL
WBC # BLD AUTO: 4.7 K/UL (ref 3.6–11)

## 2024-05-30 NOTE — TELEPHONE ENCOUNTER
----- Message from Azra Duong sent at 5/22/2024  9:53 AM EDT -----  Subject: Referral Request    Reason for referral request? pt. was at ED on 5/22/2024 for facial   pain/trigeminal neuralgia on right side pt. is being advised to get a   referral for neurology from her PCP please , call pt. to discuss   Provider patient wants to be referred to(if known):     Provider Phone Number(if known):    Additional Information for Provider?   ---------------------------------------------------------------------------  --------------  CALL BACK INFO    [phone]; OK to leave message on voicemail  ---------------------------------------------------------------------------  --------------

## 2024-05-30 NOTE — TELEPHONE ENCOUNTER
Patient is at [facility] pharmacy and they are saying they have not rc'vd the prescription for meloxicam (MOBIC) 15 MG tablet.

## 2024-05-31 ENCOUNTER — TELEPHONE (OUTPATIENT)
Age: 74
End: 2024-05-31

## 2024-05-31 RX ORDER — MELOXICAM 15 MG/1
15 TABLET ORAL DAILY PRN
Qty: 30 TABLET | Refills: 1 | Status: SHIPPED | OUTPATIENT
Start: 2024-05-31

## 2024-05-31 NOTE — TELEPHONE ENCOUNTER
Called pt.  No answer,  Left message for return call.    Pt returned call.  Verified identity x2.    Pt states Pharmacy won't fill Rx for melaxicam due to she had a 90-day Rx filled in April  Pt states she did p/u that Rx, but never took any due to being concerned about side affects  States she put the Rx in a community Rx return bin  Pt states she now realizes she needs the Rx due to pain    Pt is asking if she can get a 30 day emergency Rx for meloxicam, or a Rx for an alternate med  Would like to know if she can take tramodol, as she has some left over from a previous Rx    Susy Wilson LPN

## 2024-05-31 NOTE — TELEPHONE ENCOUNTER
This was taken care of, see encounter dated 5/31/24    Called pt.  No answer,  DARIEN Wilson LPN

## 2024-05-31 NOTE — TELEPHONE ENCOUNTER
----- Message from Mellissa Nichols sent at 5/30/2024  2:36 PM EDT -----  Subject: Medication Problem     Medication: meloxicam (MOBIC) 15 MG tablet  Dosage: 15 mg   Ordering Provider: Kinsey Stein    Question/Problem: Josie called to advise the pharmacy could not refill   this for 90 days, since it was not ready to be filled yet. Josie would   like a callback because she does not think she took it yet, but still   needs something for pain       Pharmacy:     ---------------------------------------------------------------------------  --------------  CALL BACK INFO  [phone]; OK to leave message on voicemail  ---------------------------------------------------------------------------  --------------    SCRIPT ANSWERS  Relationship to Patient: Self

## 2024-06-03 ENCOUNTER — PATIENT MESSAGE (OUTPATIENT)
Age: 74
End: 2024-06-03

## 2024-06-03 DIAGNOSIS — D64.9 ANEMIA, UNSPECIFIED TYPE: Primary | ICD-10-CM

## 2024-06-04 NOTE — TELEPHONE ENCOUNTER
From: Kinsey Stein  To: Josie Palumbo  Sent: 6/3/2024 3:49 PM EDT  Subject: Anemia    Hi Josie,   If looks like you are slightly anemic. Are you eating meat/high iron foods regularly? If not, please do so.   Are you having any rectal/vaginal/urinary bleeding? Let me know.   We should probably repeat this test along with some iron labs in 1-2 months. Which lab would you like to use for that?   Kinsey

## 2024-08-02 ENCOUNTER — OFFICE VISIT (OUTPATIENT)
Age: 74
End: 2024-08-02
Payer: MEDICARE

## 2024-08-02 VITALS
DIASTOLIC BLOOD PRESSURE: 76 MMHG | TEMPERATURE: 98.2 F | BODY MASS INDEX: 23.38 KG/M2 | HEART RATE: 71 BPM | WEIGHT: 136.2 LBS | SYSTOLIC BLOOD PRESSURE: 124 MMHG | OXYGEN SATURATION: 99 % | RESPIRATION RATE: 14 BRPM

## 2024-08-02 DIAGNOSIS — J06.9 ACUTE UPPER RESPIRATORY INFECTION, UNSPECIFIED: ICD-10-CM

## 2024-08-02 DIAGNOSIS — J20.9 ACUTE BRONCHITIS, UNSPECIFIED ORGANISM: Primary | ICD-10-CM

## 2024-08-02 PROCEDURE — 99213 OFFICE O/P EST LOW 20 MIN: CPT | Performed by: INTERNAL MEDICINE

## 2024-08-02 PROCEDURE — G8399 PT W/DXA RESULTS DOCUMENT: HCPCS | Performed by: INTERNAL MEDICINE

## 2024-08-02 PROCEDURE — 3017F COLORECTAL CA SCREEN DOC REV: CPT | Performed by: INTERNAL MEDICINE

## 2024-08-02 PROCEDURE — G8427 DOCREV CUR MEDS BY ELIG CLIN: HCPCS | Performed by: INTERNAL MEDICINE

## 2024-08-02 PROCEDURE — 3078F DIAST BP <80 MM HG: CPT | Performed by: INTERNAL MEDICINE

## 2024-08-02 PROCEDURE — 1123F ACP DISCUSS/DSCN MKR DOCD: CPT | Performed by: INTERNAL MEDICINE

## 2024-08-02 PROCEDURE — G8420 CALC BMI NORM PARAMETERS: HCPCS | Performed by: INTERNAL MEDICINE

## 2024-08-02 PROCEDURE — 3074F SYST BP LT 130 MM HG: CPT | Performed by: INTERNAL MEDICINE

## 2024-08-02 PROCEDURE — 1090F PRES/ABSN URINE INCON ASSESS: CPT | Performed by: INTERNAL MEDICINE

## 2024-08-02 PROCEDURE — 1036F TOBACCO NON-USER: CPT | Performed by: INTERNAL MEDICINE

## 2024-08-02 RX ORDER — ALBUTEROL SULFATE 90 UG/1
2 AEROSOL, METERED RESPIRATORY (INHALATION) EVERY 6 HOURS PRN
Qty: 1 EACH | Refills: 2 | Status: SHIPPED | OUTPATIENT
Start: 2024-08-02

## 2024-08-02 RX ORDER — CALCIUM CARBONATE 500(1250)
500 TABLET ORAL DAILY
COMMUNITY

## 2024-08-02 ASSESSMENT — ENCOUNTER SYMPTOMS
CHEST TIGHTNESS: 1
SHORTNESS OF BREATH: 1
COUGH: 0
WHEEZING: 0

## 2024-08-07 ENCOUNTER — TELEPHONE (OUTPATIENT)
Age: 74
End: 2024-08-07

## 2024-08-07 RX ORDER — AZITHROMYCIN 250 MG/1
TABLET, FILM COATED ORAL
Qty: 6 TABLET | Refills: 0 | Status: SHIPPED | OUTPATIENT
Start: 2024-08-07 | End: 2024-08-17

## 2024-08-07 NOTE — TELEPHONE ENCOUNTER
Pt states that she is still having tightness in her chest she would like to know if she should still take the medication. She would like a call back as soon as possible.

## 2024-08-08 ENCOUNTER — TELEPHONE (OUTPATIENT)
Age: 74
End: 2024-08-08

## 2024-08-08 NOTE — TELEPHONE ENCOUNTER
Placed a call to the patient and left VM to return call to clinic. Donna called in antibiotic for patient to pharmacy.    KINGSTON Vázquez

## 2024-08-08 NOTE — TELEPHONE ENCOUNTER
Two pt identifiers verified.   PT returned phone call from  left this morning. Pt stated she picked up antibiotics on yesterday.  Pt wants to verify if she still should use her Mucinex with her antibiotics?     KINGSTON Vázquez

## 2024-08-09 NOTE — TELEPHONE ENCOUNTER
No answer, LVM    Attempting to inform pt of Kinsey's message \"continue Mucinex until symptoms resolve\"    Susy Wilson, LPN

## 2024-08-19 ENCOUNTER — TELEPHONE (OUTPATIENT)
Age: 74
End: 2024-08-19

## 2024-08-21 NOTE — TELEPHONE ENCOUNTER
Called and spoke with pt.  Verified identity x2.    Pt was seen on 8/2 for URI  Pt reports still having tightness and trouble breathing  Reports breathing issue prevents her from singing in choir and can feel when walking, and sometimes while sitting  Reports dry mouth, belching after eating and drinkning    Pt denies dizziness or weakness, nausea or vomiting, pain in neck, shoulders, jaw, back, or arms, and heart palpitations    As per Dr ROBLES, had been taking mucinex, which pt states helped  Was prescribed inhaler, which she is not using due to pt feels she was using incorrectly  Pt described directions for inhaler use correctly  Finished antibiotics    Call transferred to  for appointment scheduling    Susy Wilson LPN

## 2024-08-21 NOTE — TELEPHONE ENCOUNTER
PT would like to update her provider that she has been feeling tightness in her chest dryness in her mouth and is having a hard time breathing she gets out of breath fast she has acid reflex and is belching a lot. Pt would like to know if she would need an appt or not after updating provider with this information.

## 2024-08-27 ENCOUNTER — OFFICE VISIT (OUTPATIENT)
Age: 74
End: 2024-08-27
Payer: MEDICARE

## 2024-08-27 VITALS
HEIGHT: 64 IN | RESPIRATION RATE: 18 BRPM | TEMPERATURE: 98 F | WEIGHT: 136 LBS | OXYGEN SATURATION: 100 % | SYSTOLIC BLOOD PRESSURE: 110 MMHG | HEART RATE: 69 BPM | DIASTOLIC BLOOD PRESSURE: 85 MMHG | BODY MASS INDEX: 23.22 KG/M2

## 2024-08-27 DIAGNOSIS — D53.9 MACROCYTIC ANEMIA: ICD-10-CM

## 2024-08-27 DIAGNOSIS — J45.909 REACTIVE AIRWAY DISEASE WITHOUT COMPLICATION, UNSPECIFIED ASTHMA SEVERITY, UNSPECIFIED WHETHER PERSISTENT: Primary | ICD-10-CM

## 2024-08-27 DIAGNOSIS — J30.9 ALLERGIC RHINITIS, UNSPECIFIED SEASONALITY, UNSPECIFIED TRIGGER: ICD-10-CM

## 2024-08-27 LAB
BASOPHILS # BLD: 0.1 K/UL (ref 0–0.1)
BASOPHILS NFR BLD: 1 % (ref 0–1)
DIFFERENTIAL METHOD BLD: ABNORMAL
EOSINOPHIL # BLD: 0.2 K/UL (ref 0–0.4)
EOSINOPHIL NFR BLD: 5 % (ref 0–7)
ERYTHROCYTE [DISTWIDTH] IN BLOOD BY AUTOMATED COUNT: 14.7 % (ref 11.5–14.5)
FERRITIN SERPL-MCNC: 71 NG/ML (ref 8–252)
FOLATE SERPL-MCNC: 25.5 NG/ML (ref 5–21)
HCT VFR BLD AUTO: 36.3 % (ref 35–47)
HGB BLD-MCNC: 11.2 G/DL (ref 11.5–16)
IMM GRANULOCYTES # BLD AUTO: 0 K/UL (ref 0–0.04)
IMM GRANULOCYTES NFR BLD AUTO: 0 % (ref 0–0.5)
IRON SATN MFR SERPL: 21 % (ref 20–50)
IRON SERPL-MCNC: 65 UG/DL (ref 35–150)
LYMPHOCYTES # BLD: 2.2 K/UL (ref 0.8–3.5)
LYMPHOCYTES NFR BLD: 47 % (ref 12–49)
MCH RBC QN AUTO: 25.3 PG (ref 26–34)
MCHC RBC AUTO-ENTMCNC: 30.9 G/DL (ref 30–36.5)
MCV RBC AUTO: 82.1 FL (ref 80–99)
MONOCYTES # BLD: 0.3 K/UL (ref 0–1)
MONOCYTES NFR BLD: 7 % (ref 5–13)
NEUTS SEG # BLD: 1.9 K/UL (ref 1.8–8)
NEUTS SEG NFR BLD: 40 % (ref 32–75)
NRBC # BLD: 0 K/UL (ref 0–0.01)
NRBC BLD-RTO: 0 PER 100 WBC
PLATELET # BLD AUTO: 233 K/UL (ref 150–400)
PMV BLD AUTO: 10.9 FL (ref 8.9–12.9)
RBC # BLD AUTO: 4.42 M/UL (ref 3.8–5.2)
TIBC SERPL-MCNC: 306 UG/DL (ref 250–450)
VIT B12 SERPL-MCNC: 464 PG/ML (ref 193–986)
WBC # BLD AUTO: 4.7 K/UL (ref 3.6–11)

## 2024-08-27 PROCEDURE — 99214 OFFICE O/P EST MOD 30 MIN: CPT | Performed by: PHYSICIAN ASSISTANT

## 2024-08-27 RX ORDER — FLUTICASONE PROPIONATE 50 MCG
2 SPRAY, SUSPENSION (ML) NASAL DAILY
COMMUNITY
Start: 2024-08-27

## 2024-08-27 RX ORDER — CETIRIZINE HYDROCHLORIDE 10 MG/1
10 TABLET ORAL NIGHTLY
COMMUNITY
Start: 2024-08-27

## 2024-08-27 ASSESSMENT — ENCOUNTER SYMPTOMS
CHEST TIGHTNESS: 1
WHEEZING: 0
COUGH: 0

## 2024-08-27 NOTE — PROGRESS NOTES
I have reviewed all needed documentation in preparation for visit. Verified patient by name and date of birth  Chief Complaint   Patient presents with    Shortness of Breath     Stared 3 weeks ago-        Vitals:    08/27/24 0954   BP: 110/85   Site: Right Upper Arm   Position: Sitting   Cuff Size: Medium Adult   Pulse: 69   Resp: 18   Temp: 98 °F (36.7 °C)   TempSrc: Temporal   SpO2: 100%   Weight: 61.7 kg (136 lb)   Height: 1.626 m (5' 4\")       Health Maintenance Due   Topic Date Due    Diabetic retinal exam  Never done    Shingles vaccine (1 of 2) Never done    Respiratory Syncytial Virus (RSV) Pregnant or age 60 yrs+ (1 - 1-dose 60+ series) Never done    DTaP/Tdap/Td vaccine (2 - Td or Tdap) 09/13/2019    Diabetic foot exam  08/01/2022    COVID-19 Vaccine (5 - 2023-24 season) 12/02/2023     \"Have you been to the ER, urgent care clinic since your last visit?  Hospitalized since your last visit?\"    NO    “Have you seen or consulted any other health care providers outside of [facility] since your last visit?”    NO            Click Here for Release of Records Request         Bree bass CMA

## 2024-08-27 NOTE — PROGRESS NOTES
Josie Palumbo is a 73 y.o. female  Chief Complaint   Patient presents with    Shortness of Breath     Stared 3 weeks ago-      Vitals:    08/27/24 0954   BP: 110/85   Pulse: 69   Resp: 18   Temp: 98 °F (36.7 °C)   SpO2: 100%      Wt Readings from Last 3 Encounters:   08/27/24 61.7 kg (136 lb)   08/02/24 61.8 kg (136 lb 3.2 oz)   05/29/24 62 kg (136 lb 9.6 oz)     BMI Readings from Last 3 Encounters:   08/27/24 23.34 kg/m²   08/02/24 23.38 kg/m²   05/29/24 23.45 kg/m²     Health Maintenance Due   Topic Date Due    Diabetic retinal exam  Never done    Shingles vaccine (1 of 2) Never done    Respiratory Syncytial Virus (RSV) Pregnant or age 60 yrs+ (1 - 1-dose 60+ series) Never done    DTaP/Tdap/Td vaccine (2 - Td or Tdap) 09/13/2019    Diabetic foot exam  08/01/2022    COVID-19 Vaccine (5 - 2023-24 season) 12/02/2023     HPI  Saw Dr. ROBLES for URI & Bronchitis 8/2/24. Mild SOB at rest persists but cough has resolved. No MASTERS. Using Albuterol Inhaler was complicated and didn't feel confident in how to use it. Throat sore off and on. Finished Zpack 2 weeks ago. Hx allergies but hasn't been taking any medication for this. Denies hx asthma/copd. No hx smoking.     Hx mild macrocytic anemia. Has been eating meat more regularly now. Ready for repeat labs.     ROS  Review of Systems   Constitutional:  Negative for fever.   Respiratory:  Positive for chest tightness. Negative for cough and wheezing.    Cardiovascular:  Negative for chest pain and palpitations.   Neurological:  Negative for headaches.   Psychiatric/Behavioral:  Negative for dysphoric mood.      EXAM  Physical Exam  Vitals and nursing note reviewed.   Constitutional:       General: She is not in acute distress.     Appearance: Normal appearance. She is not toxic-appearing.   HENT:      Head: Normocephalic and atraumatic.      Right Ear: Ear canal and external ear normal.      Left Ear: Ear canal and external ear normal.      Ears:      Comments: Bilateral TMs with

## 2024-12-03 SDOH — ECONOMIC STABILITY: FOOD INSECURITY: WITHIN THE PAST 12 MONTHS, THE FOOD YOU BOUGHT JUST DIDN'T LAST AND YOU DIDN'T HAVE MONEY TO GET MORE.: NEVER TRUE

## 2024-12-03 SDOH — ECONOMIC STABILITY: FOOD INSECURITY: WITHIN THE PAST 12 MONTHS, YOU WORRIED THAT YOUR FOOD WOULD RUN OUT BEFORE YOU GOT MONEY TO BUY MORE.: NEVER TRUE

## 2024-12-03 SDOH — ECONOMIC STABILITY: INCOME INSECURITY: HOW HARD IS IT FOR YOU TO PAY FOR THE VERY BASICS LIKE FOOD, HOUSING, MEDICAL CARE, AND HEATING?: NOT HARD AT ALL

## 2024-12-03 SDOH — ECONOMIC STABILITY: TRANSPORTATION INSECURITY
IN THE PAST 12 MONTHS, HAS LACK OF TRANSPORTATION KEPT YOU FROM MEETINGS, WORK, OR FROM GETTING THINGS NEEDED FOR DAILY LIVING?: NO

## 2024-12-04 ENCOUNTER — OFFICE VISIT (OUTPATIENT)
Age: 74
End: 2024-12-04
Payer: MEDICARE

## 2024-12-04 VITALS
RESPIRATION RATE: 18 BRPM | TEMPERATURE: 97.8 F | SYSTOLIC BLOOD PRESSURE: 132 MMHG | DIASTOLIC BLOOD PRESSURE: 68 MMHG | HEART RATE: 80 BPM | HEIGHT: 64 IN | OXYGEN SATURATION: 100 % | BODY MASS INDEX: 23.56 KG/M2 | WEIGHT: 138 LBS

## 2024-12-04 DIAGNOSIS — I10 ESSENTIAL HYPERTENSION: ICD-10-CM

## 2024-12-04 DIAGNOSIS — M65.331 TRIGGER MIDDLE FINGER OF RIGHT HAND: Primary | ICD-10-CM

## 2024-12-04 DIAGNOSIS — M85.80 OSTEOPENIA, UNSPECIFIED LOCATION: ICD-10-CM

## 2024-12-04 DIAGNOSIS — R32 URINARY INCONTINENCE, UNSPECIFIED TYPE: ICD-10-CM

## 2024-12-04 DIAGNOSIS — E11.42 TYPE 2 DIABETES MELLITUS WITH DIABETIC POLYNEUROPATHY, WITHOUT LONG-TERM CURRENT USE OF INSULIN (HCC): ICD-10-CM

## 2024-12-04 DIAGNOSIS — Z78.0 POSTMENOPAUSAL: ICD-10-CM

## 2024-12-04 DIAGNOSIS — E78.00 PURE HYPERCHOLESTEROLEMIA: ICD-10-CM

## 2024-12-04 DIAGNOSIS — F43.21 GRIEF: ICD-10-CM

## 2024-12-04 PROCEDURE — 99214 OFFICE O/P EST MOD 30 MIN: CPT | Performed by: PHYSICIAN ASSISTANT

## 2024-12-04 RX ORDER — CALCIUM CARBONATE 500(1250)
500 TABLET ORAL DAILY
COMMUNITY
Start: 2024-12-04

## 2024-12-04 RX ORDER — ATORVASTATIN CALCIUM 20 MG/1
20 TABLET, FILM COATED ORAL DAILY
Qty: 90 TABLET | Refills: 1 | Status: SHIPPED | OUTPATIENT
Start: 2024-12-04

## 2024-12-04 RX ORDER — HYDROCHLOROTHIAZIDE 25 MG/1
25 TABLET ORAL EVERY MORNING
Qty: 90 TABLET | Refills: 1 | Status: SHIPPED | OUTPATIENT
Start: 2024-12-04

## 2024-12-04 ASSESSMENT — ENCOUNTER SYMPTOMS
SHORTNESS OF BREATH: 0
COUGH: 0

## 2024-12-04 NOTE — PROGRESS NOTES
I have reviewed all needed documentation in preparation for visit. Verified patient by name and date of birth  Chief Complaint   Patient presents with    6 Month Follow-Up     No concerns        Vitals:    12/04/24 1109   BP: 132/68   Site: Right Upper Arm   Position: Sitting   Cuff Size: Medium Adult   Pulse: 80   Resp: 18   Temp: 97.8 °F (36.6 °C)   TempSrc: Temporal   SpO2: 100%   Weight: 62.6 kg (138 lb)   Height: 1.626 m (5' 4\")       Health Maintenance Due   Topic Date Due    Diabetic retinal exam  Never done    Shingles vaccine (1 of 2) Never done    DTaP/Tdap/Td vaccine (2 - Td or Tdap) 09/13/2019    Diabetic foot exam  08/01/2022    Respiratory Syncytial Virus (RSV) Pregnant or age 60 yrs+ (1 - 1-dose 75+ series) Never done     \"Have you been to the ER, urgent care clinic since your last visit?  Hospitalized since your last visit?\"    NO    “Have you seen or consulted any other health care providers outside of [facility] since your last visit?”    NO            Click Here for Release of Records Request         Bree bass CMA

## 2024-12-04 NOTE — PROGRESS NOTES
Josie Palumbo is a 74 y.o. female  Chief Complaint   Patient presents with    6 Month Follow-Up     No concerns      Vitals:    12/04/24 1109   BP: 132/68   Pulse: 80   Resp: 18   Temp: 97.8 °F (36.6 °C)   SpO2: 100%      Wt Readings from Last 3 Encounters:   12/04/24 62.6 kg (138 lb)   08/27/24 61.7 kg (136 lb)   08/02/24 61.8 kg (136 lb 3.2 oz)     BMI Readings from Last 3 Encounters:   12/04/24 23.69 kg/m²   08/27/24 23.34 kg/m²   08/02/24 23.38 kg/m²     Health Maintenance Due   Topic Date Due    Diabetic retinal exam  Never done    Shingles vaccine (1 of 2) Never done    DTaP/Tdap/Td vaccine (2 - Td or Tdap) 09/13/2019    Diabetic foot exam  08/01/2022    Respiratory Syncytial Virus (RSV) Pregnant or age 60 yrs+ (1 - 1-dose 75+ series) Never done     HPI  CV follow up  BP controlled:  BP Readings from Last 3 Encounters:   12/04/24 132/68   08/27/24 110/85   08/02/24 124/76     Currently taking:   Hypertension Medications       Thiazides and Thiazide-Like Diuretics       hydroCHLOROthiazide (HYDRODIURIL) 25 MG tablet Take 1 tablet by mouth every morning            Creatinine (MG/DL)   Date Value   05/29/2024 0.86      Cholesterol Meds  atorvastatin - 20 MG  Lab Results   Component Value Date    LDL 73.8 05/29/2024     LDL controlled at last check.     Urinary urgency and mild urge incont 3x/week x years. Has never heard of Kegals.     Osteopenia in 2021. Has been on and off of Calcium. Planning to restart.     Grieving 's death (12/2023) but attending support group and allowing herself to grieve. Still getting swati out of seeing her grandchildren and planning to visit her in laws as usual this [place].     Trigger finger increasingly bothersome.     ROS  Review of Systems   Constitutional:  Negative for fever.   Respiratory:  Negative for cough and shortness of breath.    Cardiovascular:  Negative for chest pain and palpitations.   Neurological:  Negative for headaches.   Psychiatric/Behavioral:

## 2024-12-05 LAB
ALBUMIN SERPL-MCNC: 3.9 G/DL (ref 3.5–5)
ALBUMIN/GLOB SERPL: 1.3 (ref 1.1–2.2)
ALP SERPL-CCNC: 45 U/L (ref 45–117)
ALT SERPL-CCNC: 19 U/L (ref 12–78)
ANION GAP SERPL CALC-SCNC: 4 MMOL/L (ref 2–12)
APPEARANCE UR: CLEAR
AST SERPL-CCNC: 17 U/L (ref 15–37)
BACTERIA URNS QL MICRO: NEGATIVE /HPF
BILIRUB SERPL-MCNC: 0.2 MG/DL (ref 0.2–1)
BILIRUB UR QL: NEGATIVE
BUN SERPL-MCNC: 15 MG/DL (ref 6–20)
BUN/CREAT SERPL: 16 (ref 12–20)
CALCIUM SERPL-MCNC: 10.7 MG/DL (ref 8.5–10.1)
CHLORIDE SERPL-SCNC: 107 MMOL/L (ref 97–108)
CO2 SERPL-SCNC: 29 MMOL/L (ref 21–32)
COLOR UR: NORMAL
CREAT SERPL-MCNC: 0.93 MG/DL (ref 0.55–1.02)
EPITH CASTS URNS QL MICRO: NORMAL /LPF
EST. AVERAGE GLUCOSE BLD GHB EST-MCNC: 126 MG/DL
GLOBULIN SER CALC-MCNC: 2.9 G/DL (ref 2–4)
GLUCOSE SERPL-MCNC: 76 MG/DL (ref 65–100)
GLUCOSE UR STRIP.AUTO-MCNC: NEGATIVE MG/DL
HBA1C MFR BLD: 6 % (ref 4–5.6)
HGB UR QL STRIP: NEGATIVE
HYALINE CASTS URNS QL MICRO: NORMAL /LPF (ref 0–5)
KETONES UR QL STRIP.AUTO: NEGATIVE MG/DL
LEUKOCYTE ESTERASE UR QL STRIP.AUTO: NEGATIVE
NITRITE UR QL STRIP.AUTO: NEGATIVE
PH UR STRIP: 5.5 (ref 5–8)
POTASSIUM SERPL-SCNC: 3.7 MMOL/L (ref 3.5–5.1)
PROT SERPL-MCNC: 6.8 G/DL (ref 6.4–8.2)
PROT UR STRIP-MCNC: NEGATIVE MG/DL
RBC #/AREA URNS HPF: NORMAL /HPF (ref 0–5)
SODIUM SERPL-SCNC: 140 MMOL/L (ref 136–145)
SP GR UR REFRACTOMETRY: 1.02 (ref 1–1.03)
URINE CULTURE IF INDICATED: NORMAL
UROBILINOGEN UR QL STRIP.AUTO: 0.2 EU/DL (ref 0.2–1)
WBC URNS QL MICRO: NORMAL /HPF (ref 0–4)

## 2024-12-09 DIAGNOSIS — E83.52 HYPERCALCEMIA: Primary | ICD-10-CM

## 2024-12-13 ENCOUNTER — TELEPHONE (OUTPATIENT)
Age: 74
End: 2024-12-13

## 2024-12-13 NOTE — TELEPHONE ENCOUNTER
Patient states she saw DEBBY Portillo on 12/04/24 and after her lab results came back, she was informed to stop taking calcium supplements. She would like to know if she should also stop taking the Cholecalciferol (VITAMIN D3) 125 MCG (5000 UT) TABS  (states she has not taken in over a year) and the Centrum Women 50 + Multivitamin. I reminded the patient that DEBBY Portillo is currently out of the office until the end of January, however I would send her message to the clinical staff to be reviewed by another provider. She understood.

## 2024-12-17 ENCOUNTER — TELEPHONE (OUTPATIENT)
Age: 74
End: 2024-12-17

## 2024-12-17 NOTE — TELEPHONE ENCOUNTER
Patient called for an update from the encounter on 12/13/24. I informed her that Dr. Culp responded to the advice request and a message was sent to her this morning. I let her know that she could check her MyChart and she understood.

## 2025-02-12 ENCOUNTER — OFFICE VISIT (OUTPATIENT)
Age: 75
End: 2025-02-12
Payer: MEDICARE

## 2025-02-12 VITALS
HEIGHT: 63 IN | TEMPERATURE: 98.6 F | SYSTOLIC BLOOD PRESSURE: 128 MMHG | OXYGEN SATURATION: 98 % | WEIGHT: 137.4 LBS | DIASTOLIC BLOOD PRESSURE: 70 MMHG | RESPIRATION RATE: 16 BRPM | HEART RATE: 77 BPM | BODY MASS INDEX: 24.34 KG/M2

## 2025-02-12 DIAGNOSIS — E11.42 TYPE 2 DIABETES MELLITUS WITH DIABETIC POLYNEUROPATHY, WITHOUT LONG-TERM CURRENT USE OF INSULIN (HCC): ICD-10-CM

## 2025-02-12 DIAGNOSIS — K21.9 GASTROESOPHAGEAL REFLUX DISEASE WITHOUT ESOPHAGITIS: ICD-10-CM

## 2025-02-12 DIAGNOSIS — E78.00 HYPERCHOLESTEREMIA: ICD-10-CM

## 2025-02-12 DIAGNOSIS — E83.52 HYPERCALCEMIA: ICD-10-CM

## 2025-02-12 DIAGNOSIS — J30.9 ALLERGIC RHINITIS, UNSPECIFIED SEASONALITY, UNSPECIFIED TRIGGER: ICD-10-CM

## 2025-02-12 DIAGNOSIS — I10 ESSENTIAL HYPERTENSION: Primary | ICD-10-CM

## 2025-02-12 PROCEDURE — G8427 DOCREV CUR MEDS BY ELIG CLIN: HCPCS | Performed by: PHYSICIAN ASSISTANT

## 2025-02-12 PROCEDURE — 1036F TOBACCO NON-USER: CPT | Performed by: PHYSICIAN ASSISTANT

## 2025-02-12 PROCEDURE — 1123F ACP DISCUSS/DSCN MKR DOCD: CPT | Performed by: PHYSICIAN ASSISTANT

## 2025-02-12 PROCEDURE — G8420 CALC BMI NORM PARAMETERS: HCPCS | Performed by: PHYSICIAN ASSISTANT

## 2025-02-12 PROCEDURE — 3046F HEMOGLOBIN A1C LEVEL >9.0%: CPT | Performed by: PHYSICIAN ASSISTANT

## 2025-02-12 PROCEDURE — 99214 OFFICE O/P EST MOD 30 MIN: CPT | Performed by: PHYSICIAN ASSISTANT

## 2025-02-12 PROCEDURE — 2022F DILAT RTA XM EVC RTNOPTHY: CPT | Performed by: PHYSICIAN ASSISTANT

## 2025-02-12 PROCEDURE — 3078F DIAST BP <80 MM HG: CPT | Performed by: PHYSICIAN ASSISTANT

## 2025-02-12 PROCEDURE — G2211 COMPLEX E/M VISIT ADD ON: HCPCS | Performed by: PHYSICIAN ASSISTANT

## 2025-02-12 PROCEDURE — 1160F RVW MEDS BY RX/DR IN RCRD: CPT | Performed by: PHYSICIAN ASSISTANT

## 2025-02-12 PROCEDURE — 1159F MED LIST DOCD IN RCRD: CPT | Performed by: PHYSICIAN ASSISTANT

## 2025-02-12 PROCEDURE — 3017F COLORECTAL CA SCREEN DOC REV: CPT | Performed by: PHYSICIAN ASSISTANT

## 2025-02-12 PROCEDURE — 1090F PRES/ABSN URINE INCON ASSESS: CPT | Performed by: PHYSICIAN ASSISTANT

## 2025-02-12 PROCEDURE — G8399 PT W/DXA RESULTS DOCUMENT: HCPCS | Performed by: PHYSICIAN ASSISTANT

## 2025-02-12 PROCEDURE — 3074F SYST BP LT 130 MM HG: CPT | Performed by: PHYSICIAN ASSISTANT

## 2025-02-12 RX ORDER — ATORVASTATIN CALCIUM 20 MG/1
20 TABLET, FILM COATED ORAL DAILY
Qty: 90 TABLET | Refills: 1 | Status: SHIPPED | OUTPATIENT
Start: 2025-02-12

## 2025-02-12 RX ORDER — NAPROXEN 500 MG/1
500 TABLET ORAL 2 TIMES DAILY WITH MEALS
COMMUNITY

## 2025-02-12 RX ORDER — FAMOTIDINE 20 MG/1
20 TABLET, FILM COATED ORAL 2 TIMES DAILY PRN
Qty: 60 TABLET | Refills: 11 | Status: SHIPPED | OUTPATIENT
Start: 2025-02-12

## 2025-02-12 SDOH — ECONOMIC STABILITY: FOOD INSECURITY: WITHIN THE PAST 12 MONTHS, THE FOOD YOU BOUGHT JUST DIDN'T LAST AND YOU DIDN'T HAVE MONEY TO GET MORE.: NEVER TRUE

## 2025-02-12 SDOH — ECONOMIC STABILITY: FOOD INSECURITY: WITHIN THE PAST 12 MONTHS, YOU WORRIED THAT YOUR FOOD WOULD RUN OUT BEFORE YOU GOT MONEY TO BUY MORE.: NEVER TRUE

## 2025-02-12 ASSESSMENT — PATIENT HEALTH QUESTIONNAIRE - PHQ9
2. FEELING DOWN, DEPRESSED OR HOPELESS: NOT AT ALL
1. LITTLE INTEREST OR PLEASURE IN DOING THINGS: NOT AT ALL
SUM OF ALL RESPONSES TO PHQ QUESTIONS 1-9: 0
SUM OF ALL RESPONSES TO PHQ QUESTIONS 1-9: 0
SUM OF ALL RESPONSES TO PHQ9 QUESTIONS 1 & 2: 0
SUM OF ALL RESPONSES TO PHQ QUESTIONS 1-9: 0
SUM OF ALL RESPONSES TO PHQ QUESTIONS 1-9: 0

## 2025-02-12 ASSESSMENT — ENCOUNTER SYMPTOMS
TROUBLE SWALLOWING: 0
SORE THROAT: 0
COUGH: 0
SHORTNESS OF BREATH: 0
SINUS PRESSURE: 1

## 2025-02-12 NOTE — PROGRESS NOTES
I have reviewed all needed documentation in preparation for visit. Verified patient by name and date of birth  Chief Complaint   Patient presents with    Hand Pain     Swelling     Headache    Carpal Tunnel    Dizziness     Patient describe the feeling as light headiness        Vitals:    02/12/25 1112   BP: (!) 147/78   Site: Right Upper Arm   Position: Sitting   Cuff Size: Medium Adult   Pulse: 77   Resp: 16   Temp: 98.6 °F (37 °C)   TempSrc: Temporal   SpO2: 98%   Weight: 62.3 kg (137 lb 6.4 oz)   Height: 1.6 m (5' 3\")       Health Maintenance Due   Topic Date Due    Diabetic retinal exam  Never done    Shingles vaccine (1 of 2) Never done    DTaP/Tdap/Td vaccine (2 - Td or Tdap) 09/13/2019    Diabetic foot exam  08/01/2022    Respiratory Syncytial Virus (RSV) Pregnant or age 60 yrs+ (1 - 1-dose 75+ series) Never done    Annual Wellness Visit (Medicare Advantage)  01/01/2025    Depression Screen  01/24/2025     \"Have you been to the ER, urgent care clinic since your last visit?  Hospitalized since your last visit?\"    NO    “Have you seen or consulted any other health care providers outside of [facility] since your last visit?”    YES - When: approximately 2 months ago.  Where and Why: Orthopedics office - [facility] see encounters .            Click Here for Release of Records Request         KINGSTON Vázquez  
supple. No rigidity.   Lymphadenopathy:      Cervical: No cervical adenopathy.   Skin:     General: Skin is warm and dry.      Findings: No rash.   Neurological:      General: No focal deficit present.      Mental Status: She is alert and oriented to person, place, and time.   Psychiatric:         Mood and Affect: Mood normal.         Behavior: Behavior normal.         Thought Content: Thought content normal.         Judgment: Judgment normal.         ASSESSMENT/PLAN  Essential hypertension - controlled  Hypercholesteremia  -     atorvastatin (LIPITOR) 20 MG tablet; Take 1 tablet by mouth daily, Disp-90 tablet, R-1Normal  -     Lipid Panel; Future  Type 2 diabetes mellitus with diabetic polyneuropathy, without long-term current use of insulin (HCC)  -     Hemoglobin A1C; Future  Hypercalcemia  -     Comprehensive Metabolic Panel; Future  Allergic rhinitis, unspecified seasonality, unspecified trigger  Restart and STAY ON Flonase, Zyrtec  Gastroesophageal reflux disease without esophagitis  -    Start famotidine (PEPCID) 20 MG tablet; Take 1 tablet by mouth 2 times daily as needed (heartburn), Disp-60 tablet, R-11Normal     Refills Sent.

## 2025-02-27 ENCOUNTER — HOSPITAL ENCOUNTER (OUTPATIENT)
Facility: HOSPITAL | Age: 75
End: 2025-02-27
Payer: MEDICARE

## 2025-02-27 ENCOUNTER — HOSPITAL ENCOUNTER (OUTPATIENT)
Facility: HOSPITAL | Age: 75
Discharge: HOME | End: 2025-02-27
Payer: MEDICARE

## 2025-02-27 VITALS — WEIGHT: 135 LBS | BODY MASS INDEX: 23.92 KG/M2 | HEIGHT: 63 IN

## 2025-02-27 DIAGNOSIS — Z12.31 ENCOUNTER FOR SCREENING MAMMOGRAM FOR HIGH-RISK PATIENT: ICD-10-CM

## 2025-02-27 DIAGNOSIS — M85.80 OSTEOPENIA, UNSPECIFIED LOCATION: ICD-10-CM

## 2025-02-27 DIAGNOSIS — Z78.0 POSTMENOPAUSAL: ICD-10-CM

## 2025-02-27 PROCEDURE — 77080 DXA BONE DENSITY AXIAL: CPT

## 2025-02-27 PROCEDURE — 77063 BREAST TOMOSYNTHESIS BI: CPT

## 2025-05-27 LAB
ALBUMIN SERPL-MCNC: 4.5 G/DL (ref 3.8–4.8)
ALP SERPL-CCNC: 44 IU/L (ref 44–121)
ALT SERPL-CCNC: 13 IU/L (ref 0–32)
AST SERPL-CCNC: 16 IU/L (ref 0–40)
BILIRUB SERPL-MCNC: 0.4 MG/DL (ref 0–1.2)
BUN SERPL-MCNC: 17 MG/DL (ref 8–27)
BUN/CREAT SERPL: 18 (ref 12–28)
CALCIUM SERPL-MCNC: 10.1 MG/DL (ref 8.7–10.3)
CHLORIDE SERPL-SCNC: 105 MMOL/L (ref 96–106)
CHOLEST SERPL-MCNC: 154 MG/DL (ref 100–199)
CO2 SERPL-SCNC: 23 MMOL/L (ref 20–29)
CREAT SERPL-MCNC: 0.95 MG/DL (ref 0.57–1)
EGFRCR SERPLBLD CKD-EPI 2021: 62 ML/MIN/1.73
GLOBULIN SER CALC-MCNC: 2 G/DL (ref 1.5–4.5)
GLUCOSE SERPL-MCNC: 102 MG/DL (ref 70–99)
HBA1C MFR BLD: 6 % (ref 4.8–5.6)
HDLC SERPL-MCNC: 48 MG/DL
LDLC SERPL CALC-MCNC: 91 MG/DL (ref 0–99)
POTASSIUM SERPL-SCNC: 4.3 MMOL/L (ref 3.5–5.2)
PROT SERPL-MCNC: 6.5 G/DL (ref 6–8.5)
SODIUM SERPL-SCNC: 142 MMOL/L (ref 134–144)
TRIGL SERPL-MCNC: 79 MG/DL (ref 0–149)
VLDLC SERPL CALC-MCNC: 15 MG/DL (ref 5–40)

## 2025-05-28 ENCOUNTER — RESULTS FOLLOW-UP (OUTPATIENT)
Age: 75
End: 2025-05-28

## 2025-05-28 PROBLEM — E83.52 HYPERCALCEMIA: Status: RESOLVED | Noted: 2024-12-09 | Resolved: 2025-05-28

## 2025-05-28 LAB
IMP & REVIEW OF LAB RESULTS: NORMAL
Lab: NORMAL

## 2025-06-13 ENCOUNTER — CLINICAL SUPPORT (OUTPATIENT)
Age: 75
End: 2025-06-13
Payer: MEDICARE

## 2025-06-13 ENCOUNTER — TELEPHONE (OUTPATIENT)
Age: 75
End: 2025-06-13

## 2025-06-13 VITALS
BODY MASS INDEX: 24.2 KG/M2 | HEART RATE: 76 BPM | RESPIRATION RATE: 18 BRPM | TEMPERATURE: 97.7 F | WEIGHT: 136.6 LBS | SYSTOLIC BLOOD PRESSURE: 133 MMHG | DIASTOLIC BLOOD PRESSURE: 64 MMHG

## 2025-06-13 DIAGNOSIS — I10 ESSENTIAL HYPERTENSION: Primary | ICD-10-CM

## 2025-06-13 PROCEDURE — 99211 OFF/OP EST MAY X REQ PHY/QHP: CPT | Performed by: NURSE PRACTITIONER

## 2025-06-13 PROCEDURE — 3078F DIAST BP <80 MM HG: CPT | Performed by: NURSE PRACTITIONER

## 2025-06-13 PROCEDURE — 3075F SYST BP GE 130 - 139MM HG: CPT | Performed by: NURSE PRACTITIONER

## 2025-06-13 NOTE — PROGRESS NOTES
Pt walked in for BP check due to reporting a little light-headedness   BP, vitals all WNL  Pt also reporting some pressure in forehead  States she hasn't been taking allergy medication, but will start  Advised pt to return to clinic, or go to a walk-in clinic, if symptoms become bothersome    Susy Wilson LPN

## 2025-06-13 NOTE — TELEPHONE ENCOUNTER
Pt walked in to clinic for BP check  Nurses visit  See visit notes  Pt asked about previous labs  This nurse read [facility] lab notes to pt  Pt verbalized understanding.  Pt wants to know about some of the labs that had high levels.    Susy Wilson LPN

## 2025-07-03 DIAGNOSIS — I10 ESSENTIAL HYPERTENSION: ICD-10-CM

## 2025-07-03 RX ORDER — HYDROCHLOROTHIAZIDE 25 MG/1
25 TABLET ORAL EVERY MORNING
Qty: 90 TABLET | Refills: 1 | Status: SHIPPED | OUTPATIENT
Start: 2025-07-03

## 2025-07-03 NOTE — TELEPHONE ENCOUNTER
Refill request received from [facility] for   Requested Prescriptions     Pending Prescriptions Disp Refills    hydroCHLOROthiazide (HYDRODIURIL) 25 MG tablet [Pharmacy Med Name: HYDROCHLOROTHIAZIDE 25 MG TAB] 90 tablet 1     Sig: TAKE 1 TABLET BY MOUTH EVERY DAY IN THE MORNING     Last office visit: 6/13/2025   Next office visit: 8/13/2025     Routed to DEBBY Portillo for review.

## 2025-08-11 ENCOUNTER — OFFICE VISIT (OUTPATIENT)
Age: 75
End: 2025-08-11
Payer: MEDICARE

## 2025-08-11 VITALS
OXYGEN SATURATION: 98 % | DIASTOLIC BLOOD PRESSURE: 82 MMHG | BODY MASS INDEX: 24.27 KG/M2 | SYSTOLIC BLOOD PRESSURE: 138 MMHG | WEIGHT: 137 LBS | HEIGHT: 63 IN | RESPIRATION RATE: 18 BRPM | TEMPERATURE: 98.2 F

## 2025-08-11 DIAGNOSIS — K21.9 GASTROESOPHAGEAL REFLUX DISEASE WITHOUT ESOPHAGITIS: ICD-10-CM

## 2025-08-11 DIAGNOSIS — K59.09 CHRONIC CONSTIPATION: ICD-10-CM

## 2025-08-11 DIAGNOSIS — M85.80 OSTEOPENIA, UNSPECIFIED LOCATION: ICD-10-CM

## 2025-08-11 DIAGNOSIS — E78.00 HYPERCHOLESTEREMIA: ICD-10-CM

## 2025-08-11 DIAGNOSIS — Z00.00 MEDICARE ANNUAL WELLNESS VISIT, SUBSEQUENT: Primary | ICD-10-CM

## 2025-08-11 DIAGNOSIS — M51.361 DEGENERATION OF INTERVERTEBRAL DISC OF LUMBAR REGION WITH LOWER EXTREMITY PAIN: ICD-10-CM

## 2025-08-11 DIAGNOSIS — I10 ESSENTIAL HYPERTENSION: ICD-10-CM

## 2025-08-11 PROCEDURE — G8399 PT W/DXA RESULTS DOCUMENT: HCPCS | Performed by: PHYSICIAN ASSISTANT

## 2025-08-11 PROCEDURE — 1126F AMNT PAIN NOTED NONE PRSNT: CPT | Performed by: PHYSICIAN ASSISTANT

## 2025-08-11 PROCEDURE — G8420 CALC BMI NORM PARAMETERS: HCPCS | Performed by: PHYSICIAN ASSISTANT

## 2025-08-11 PROCEDURE — 1159F MED LIST DOCD IN RCRD: CPT | Performed by: PHYSICIAN ASSISTANT

## 2025-08-11 PROCEDURE — 3017F COLORECTAL CA SCREEN DOC REV: CPT | Performed by: PHYSICIAN ASSISTANT

## 2025-08-11 PROCEDURE — 99214 OFFICE O/P EST MOD 30 MIN: CPT | Performed by: PHYSICIAN ASSISTANT

## 2025-08-11 PROCEDURE — 3079F DIAST BP 80-89 MM HG: CPT | Performed by: PHYSICIAN ASSISTANT

## 2025-08-11 PROCEDURE — G0439 PPPS, SUBSEQ VISIT: HCPCS | Performed by: PHYSICIAN ASSISTANT

## 2025-08-11 PROCEDURE — 1090F PRES/ABSN URINE INCON ASSESS: CPT | Performed by: PHYSICIAN ASSISTANT

## 2025-08-11 PROCEDURE — 3075F SYST BP GE 130 - 139MM HG: CPT | Performed by: PHYSICIAN ASSISTANT

## 2025-08-11 PROCEDURE — G8427 DOCREV CUR MEDS BY ELIG CLIN: HCPCS | Performed by: PHYSICIAN ASSISTANT

## 2025-08-11 PROCEDURE — 1123F ACP DISCUSS/DSCN MKR DOCD: CPT | Performed by: PHYSICIAN ASSISTANT

## 2025-08-11 PROCEDURE — 1036F TOBACCO NON-USER: CPT | Performed by: PHYSICIAN ASSISTANT

## 2025-08-11 RX ORDER — ATORVASTATIN CALCIUM 20 MG/1
20 TABLET, FILM COATED ORAL DAILY
Qty: 90 TABLET | Refills: 1 | Status: SHIPPED | OUTPATIENT
Start: 2025-08-11

## 2025-08-11 RX ORDER — HYDROCHLOROTHIAZIDE 25 MG/1
25 TABLET ORAL EVERY MORNING
Qty: 90 TABLET | Refills: 1 | Status: SHIPPED | OUTPATIENT
Start: 2025-08-11

## 2025-08-11 RX ORDER — CHOLECALCIFEROL (VITAMIN D3) 1250 MCG
5000 CAPSULE ORAL EVERY OTHER DAY
COMMUNITY

## 2025-08-11 RX ORDER — POLYETHYLENE GLYCOL 3350 17 G/17G
17 POWDER, FOR SOLUTION ORAL DAILY PRN
Qty: 1530 G | Refills: 1 | Status: SHIPPED | OUTPATIENT
Start: 2025-08-11

## 2025-08-11 RX ORDER — FAMOTIDINE 20 MG/1
20 TABLET, FILM COATED ORAL 2 TIMES DAILY PRN
Qty: 180 TABLET | Refills: 3 | Status: SHIPPED | OUTPATIENT
Start: 2025-08-11

## 2025-08-11 ASSESSMENT — PATIENT HEALTH QUESTIONNAIRE - PHQ9
SUM OF ALL RESPONSES TO PHQ QUESTIONS 1-9: 2
1. LITTLE INTEREST OR PLEASURE IN DOING THINGS: NOT AT ALL
SUM OF ALL RESPONSES TO PHQ QUESTIONS 1-9: 2
SUM OF ALL RESPONSES TO PHQ QUESTIONS 1-9: 2
2. FEELING DOWN, DEPRESSED OR HOPELESS: MORE THAN HALF THE DAYS
SUM OF ALL RESPONSES TO PHQ QUESTIONS 1-9: 2

## 2025-08-11 ASSESSMENT — LIFESTYLE VARIABLES
HOW MANY STANDARD DRINKS CONTAINING ALCOHOL DO YOU HAVE ON A TYPICAL DAY: 1 OR 2
HOW OFTEN DO YOU HAVE A DRINK CONTAINING ALCOHOL: 2-4 TIMES A MONTH

## 2025-08-11 ASSESSMENT — ENCOUNTER SYMPTOMS
COUGH: 0
SHORTNESS OF BREATH: 0

## 2025-09-04 ENCOUNTER — TRANSCRIBE ORDERS (OUTPATIENT)
Facility: HOSPITAL | Age: 75
End: 2025-09-04

## 2025-09-04 DIAGNOSIS — M51.362 DEGENERATION OF INTERVERTEBRAL DISC OF LUMBAR REGION WITH DISCOGENIC BACK PAIN AND LOWER EXTREMITY PAIN: Primary | ICD-10-CM

## (undated) DEVICE — CANN NASAL O2 CAPNOGRAPHY AD -- FILTERLINE

## (undated) DEVICE — Device